# Patient Record
Sex: FEMALE | Race: NATIVE HAWAIIAN OR OTHER PACIFIC ISLANDER
[De-identification: names, ages, dates, MRNs, and addresses within clinical notes are randomized per-mention and may not be internally consistent; named-entity substitution may affect disease eponyms.]

---

## 2017-10-24 ENCOUNTER — HOSPITAL ENCOUNTER (INPATIENT)
Dept: HOSPITAL 31 - C.ER | Age: 77
LOS: 21 days | Discharge: NURSING, REHAB OR LONG-TERM CARE | DRG: 356 | End: 2017-11-14
Attending: FAMILY MEDICINE | Admitting: FAMILY MEDICINE
Payer: MEDICARE

## 2017-10-24 VITALS — BODY MASS INDEX: 25.7 KG/M2

## 2017-10-24 DIAGNOSIS — Z93.1: ICD-10-CM

## 2017-10-24 DIAGNOSIS — N83.9: ICD-10-CM

## 2017-10-24 DIAGNOSIS — K52.9: ICD-10-CM

## 2017-10-24 DIAGNOSIS — N73.6: ICD-10-CM

## 2017-10-24 DIAGNOSIS — R13.10: ICD-10-CM

## 2017-10-24 DIAGNOSIS — I11.0: ICD-10-CM

## 2017-10-24 DIAGNOSIS — K63.89: ICD-10-CM

## 2017-10-24 DIAGNOSIS — I69.391: ICD-10-CM

## 2017-10-24 DIAGNOSIS — K56.41: Primary | ICD-10-CM

## 2017-10-24 DIAGNOSIS — N81.5: ICD-10-CM

## 2017-10-24 DIAGNOSIS — E78.5: ICD-10-CM

## 2017-10-24 DIAGNOSIS — I69.351: ICD-10-CM

## 2017-10-24 DIAGNOSIS — D25.9: ICD-10-CM

## 2017-10-24 DIAGNOSIS — I50.9: ICD-10-CM

## 2017-10-24 DIAGNOSIS — A41.9: ICD-10-CM

## 2017-10-24 DIAGNOSIS — F32.9: ICD-10-CM

## 2017-10-24 DIAGNOSIS — R62.7: ICD-10-CM

## 2017-10-24 DIAGNOSIS — A09: ICD-10-CM
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
## 2017-10-24 DIAGNOSIS — E11.43: ICD-10-CM

## 2017-10-24 DIAGNOSIS — G45.9: ICD-10-CM

## 2017-10-24 DIAGNOSIS — F39: ICD-10-CM

## 2017-10-24 DIAGNOSIS — K59.31: ICD-10-CM

## 2017-10-24 DIAGNOSIS — E11.65: ICD-10-CM

## 2017-10-24 DIAGNOSIS — S36.438A: ICD-10-CM

## 2017-10-24 DIAGNOSIS — K31.84: ICD-10-CM

## 2017-10-24 DIAGNOSIS — F43.9: ICD-10-CM

## 2017-10-24 DIAGNOSIS — E86.0: ICD-10-CM

## 2017-10-24 DIAGNOSIS — E46: ICD-10-CM

## 2017-10-24 DIAGNOSIS — N71.9: ICD-10-CM

## 2017-10-24 DIAGNOSIS — R65.20: ICD-10-CM

## 2017-10-24 DIAGNOSIS — E87.6: ICD-10-CM

## 2017-10-24 DIAGNOSIS — D62: ICD-10-CM

## 2017-10-24 DIAGNOSIS — D50.0: ICD-10-CM

## 2017-10-24 LAB
ALBUMIN/GLOB SERPL: 0.8 {RATIO} (ref 1–2.1)
ALP SERPL-CCNC: 139 U/L (ref 38–126)
ALT SERPL-CCNC: 154 U/L (ref 9–52)
APTT BLD: 28 SECONDS (ref 21–34)
AST SERPL-CCNC: 68 U/L (ref 14–36)
BACTERIA #/AREA URNS HPF: (no result) /[HPF]
BASOPHILS # BLD AUTO: 0 K/UL (ref 0–0.2)
BASOPHILS NFR BLD: 0.1 % (ref 0–2)
BILIRUB SERPL-MCNC: 0.5 MG/DL (ref 0.2–1.3)
BILIRUB UR-MCNC: NEGATIVE MG/DL
BUN SERPL-MCNC: 67 MG/DL (ref 7–17)
CALCIUM SERPL-MCNC: 9.1 MG/DL (ref 8.6–10.4)
CHLORIDE SERPL-SCNC: 100 MMOL/L (ref 98–107)
CO2 SERPL-SCNC: 23 MMOL/L (ref 22–30)
EOSINOPHIL # BLD AUTO: 0 K/UL (ref 0–0.7)
EOSINOPHIL NFR BLD: 0.1 % (ref 0–4)
ERYTHROCYTE [DISTWIDTH] IN BLOOD BY AUTOMATED COUNT: 14.1 % (ref 11.5–14.5)
GLOBULIN SER-MCNC: 4.5 GM/DL (ref 2.2–3.9)
GLUCOSE SERPL-MCNC: 162 MG/DL (ref 65–105)
GLUCOSE UR STRIP-MCNC: NORMAL MG/DL
HCT VFR BLD CALC: 36.5 % (ref 34–47)
INR PPP: 1.4
KETONES UR STRIP-MCNC: NEGATIVE MG/DL
LEUKOCYTE ESTERASE UR-ACNC: (no result) LEU/UL
LYMPHOCYTES # BLD AUTO: 1.1 K/UL (ref 1–4.3)
LYMPHOCYTES NFR BLD AUTO: 3.7 % (ref 20–40)
MCH RBC QN AUTO: 29.4 PG (ref 27–31)
MCHC RBC AUTO-ENTMCNC: 32.8 G/DL (ref 33–37)
MCV RBC AUTO: 89.7 FL (ref 81–99)
MONOCYTES # BLD: 1.9 K/UL (ref 0–0.8)
MONOCYTES NFR BLD: 6.5 % (ref 0–10)
NEUTROPHILS NFR BLD AUTO: 90 % (ref 50–75)
NRBC BLD AUTO-RTO: 0 % (ref 0–2)
PH UR STRIP: 5 [PH] (ref 5–8)
PLATELET # BLD: 313 K/UL (ref 130–400)
PMV BLD AUTO: 8.2 FL (ref 7.2–11.7)
POTASSIUM SERPL-SCNC: 4 MMOL/L (ref 3.6–5.2)
PROT SERPL-MCNC: 7.9 G/DL (ref 6.3–8.3)
PROT UR STRIP-MCNC: (no result) MG/DL
RBC # UR STRIP: NEGATIVE /UL
RBC #/AREA URNS HPF: < 1 /HPF (ref 0–3)
SODIUM SERPL-SCNC: 134 MMOL/L (ref 132–148)
SP GR UR STRIP: 1.02 (ref 1–1.03)
TOTAL CELLS COUNTED BLD: 100
UROBILINOGEN UR-MCNC: NORMAL MG/DL (ref 0.2–1)
WBC # BLD AUTO: 29.8 K/UL (ref 4.8–10.8)
WBC #/AREA URNS HPF: 4 /HPF (ref 0–5)

## 2017-10-24 PROCEDURE — 0WCP3ZZ EXTIRPATION OF MATTER FROM GASTROINTESTINAL TRACT, PERCUTANEOUS APPROACH: ICD-10-PCS | Performed by: SURGERY

## 2017-10-24 RX ADMIN — TAZOBACTAM SODIUM AND PIPERACILLIN SODIUM SCH MLS/HR: 375; 3 INJECTION, SOLUTION INTRAVENOUS at 07:36

## 2017-10-24 RX ADMIN — WATER SCH MLS/HR: 1 INJECTION INTRAMUSCULAR; INTRAVENOUS; SUBCUTANEOUS at 22:23

## 2017-10-24 RX ADMIN — WATER SCH MLS/HR: 1 INJECTION INTRAMUSCULAR; INTRAVENOUS; SUBCUTANEOUS at 07:04

## 2017-10-24 RX ADMIN — TAZOBACTAM SODIUM AND PIPERACILLIN SODIUM SCH MLS/HR: 375; 3 INJECTION, SOLUTION INTRAVENOUS at 17:35

## 2017-10-24 NOTE — CT
PROCEDURE:  CT Abdomen and Pelvis without intravenous contrast



HISTORY:

Abdominal pain 



COMPARISON:

None.



TECHNIQUE:

Multiple contiguous axial images were performed through the abdomen 

and pelvis without the use of intravenous contrast.  Subsequently, 

sagittal and coronal reformatted images were obtained. 



Radiation dose:



Total exam DLP = 325 mGy-cm.



This CT exam was performed using one or more of the following dose 

reduction techniques: Automated exposure control, adjustment of the 

mA and/or kV according to patient size, and/or use of iterative 

reconstruction technique.



FINDINGS:



LOWER THORAX:

Coronary artery calcifications. Prominent atelectasis and 

consolidation seen at the lung bases. Scattered calcifications noted 

within the right lower lobe. 2 millimeter ground-glass nodule in the 

right middle lobe anteriorly. 



LIVER:

Unremarkable. No gross lesion or ductal dilatation.  



GALLBLADDER AND BILE DUCTS:

Unremarkable. 



PANCREAS:

Unremarkable. No gross lesion or ductal dilatation.



SPLEEN:

Unremarkable. 



ADRENALS:

Unremarkable. No mass. 



KIDNEYS AND URETERS:

Unremarkable. No hydronephrosis. No solid mass. 



VASCULATURE:

Unremarkable. No aortic aneurysm. 



BOWEL:

Distal sigmoid colon and rectum are markedly dilated and diffusely 

stool-filled compatible with a large fecal impaction.  Evidence of 

associated stercoral proctocolitis.  Evidence of a prior gastrostomy 

tract extending from the anterior stomach wall to the skin surface. 

Mild thickening of the stomach.



APPENDIX:

Unremarkable. Normal appendix. 



PERITONEUM:

Unremarkable. No free fluid. No free air. 



LYMPH NODES:

Unremarkable. No enlarged lymph nodes. 



BLADDER:

Unremarkable. 



REPRODUCTIVE:

Heterogeneous, lobulated, and well-defined masslike area seen in the 

left hemipelvis which has multiple rounded areas of low density 

within it.  This measures approximately 10 x 7 centimeters and is 

most likely secondary to multiple exophytic left-sided uterine 

fibroids; however, recommend a follow-up pelvic ultrasound to rule 

out a solid left adnexal mass. 



BONES:

Degenerative changes with demineralization. 



Grade 1/2 anterolisthesis of L5 on S1. 



OTHER FINDINGS:

Study limited secondary to patient motion and streak artifact. Streak 

artifact from the patient's arm. 



Atherosclerotic calcification within the aorta. 



IMPRESSION:

Findings suspicious for a large fecal impaction in the distal sigmoid 

colon and rectum causing stercoral proctocolitis. The distal sigmoid 

colon and rectum are markedly dilated.



Evidence of a prior gastrostomy tract extending the anterior stomach 

wall to the skin surface.  Cannot entirely rule out a gastrocutaneous 

fistula.  Clinical correlation. Gastric wall thickening. 



Large masslike area the left pelvis, most likely due to multiple 

exophytic left uterine fibroids. 



Additional findings as above.



These findings were preliminarily reported at 5:52 a.m. on 10/24/2017 

by Dr. Ofelia Hilario virtual radiologic.

## 2017-10-24 NOTE — PCM.SURG1
Surgeon's Initial Post Op Note





- Surgeon's Notes


Surgeon: Dr. Garcia


Assistant: Dr. Sr PGY2


Type of Anesthesia: General LMA


Pre-Operative Diagnosis: fecal impaction


Operative Findings: see dictation


Post-Operative Diagnosis: same


Operation Performed: manual fecal disimpaction


Specimen/Specimens Removed: vaginal culture


Estimated Blood Loss: EBL {In ML}: 0


Drains Used: No Drains


Post-Op Condition: Good


Date of Surgery/Procedure: 10/24/17


Time of Surgery/Procedure: 14:15

## 2017-10-24 NOTE — CP.PCM.CON
History of Present Illness





- History of Present Illness


History of Present Illness: 


GENERAL SURGERY CONSULT NOTE FOR DR. GARNER





75yo French female with PMHx of HTN, DM, CVA presents to the ED from the 

nursing home for leukocytosis and abdominal pain. A French  was 

used but patient was only oriented to person and place and answered many 

questions with "I don't know". Some of the past medical history was obtained 

from EMR. She reports having lower abdominal pain. She has nausea but no 

vomiting. She reports that her last BM was 5 days ago and she last passed 

flatus 5 days ago. She is urinating normally. She is unsure when she last ate. 

She denies SOB, CP, fever, or chills. Labs done in the nursing home showed WBC 

22.





PMHx: HTN, CVA with dysphagia and right hemiparesis, DM, hyperlipidemia, UTI





Surgeries: PEG





Allergies: none





Social history: lives in nursing home





Review of Systems





- Review of Systems


All systems: reviewed and no additional remarkable complaints except (as per HPI

)





Past Patient History





- Infectious Disease


Hx of Infectious Diseases: None





- Past Medical History & Family History


Past Medical History?: Yes





- Past Social History


Smoking Status: Never Smoked





- CARDIAC


Hx Hypercholesterolemia:  (Hyperlipidimia)


Hx Hypertension: Yes





- PULMONARY


Hx Respiratory Disorders: No





- NEUROLOGICAL


Hx Neurological Disorder: Yes


HX Cerebrovascular Accident: Yes





- HEENT


Other/Comment: DYSPHAGIA, PT HAS PEG





- RENAL


Hx Chronic Kidney Disease: No





- ENDOCRINE/METABOLIC


Hx Endocrine Disorders: Yes


Hx Diabetes Mellitus Type 2: Yes





- HEMATOLOGICAL/ONCOLOGICAL


Hx Blood Disorders: No





- INTEGUMENTARY


Hx Dermatological Problems: No





- MUSCULOSKELETAL/RHEUMATOLOGICAL


Hx Musculoskeletal Disorders: No


Hx Falls: No





- GASTROINTESTINAL


Hx Gastrointestinal Disorders: Yes


Other/Comment: PEG IN PLACE,  INCONTINENT OF STOOL





- GENITOURINARY/GYNECOLOGICAL


Hx Genitourinary Disorders: Yes


Other/Comment: INCONTINENT OF URINE,





- PSYCHIATRIC


Hx Psychophysiologic Disorder: No


Hx Substance Use: No





- SURGICAL HISTORY


Hx Surgeries: No





- ANESTHESIA


Hx Anesthesia: Yes





Meds


Allergies/Adverse Reactions: 


 Allergies











Allergy/AdvReac Type Severity Reaction Status Date / Time


 


No Known Allergies Allergy   Verified 10/24/17 06:20














- Medications


Medications: 


 Current Medications





Dextrose/Sodium Chloride (Dextrose 5%/0.45% Ns 1000 Ml)  1,000 mls @ 80 mls/hr 

IV .[id number] [facility]ANDIE


   Last Admin: 10/24/17 06:43 Dose:  80 mls/hr


Piperacillin Sod/Tazobactam Sod (Zosyn 3.375 Gm Iv Premix)  3.375 gm in 50 mls 

@ 200 mls/hr IVPB Q8H [facility]


   Last Admin: 10/24/17 07:36 Dose:  200 mls/hr


Metronidazole (Flagyl)  500 mg in 100 mls @ 100 mls/hr IVPB Q8H [facility]


   Last Admin: 10/24/17 07:04 Dose:  100 mls/hr


Morphine Sulfate (Morphine)  2 mg IVP Q3 [facility]


   Last Admin: 10/24/17 10:39 Dose:  Not Given











Physical Exam





- Constitutional


Appears: Non-toxic, No Acute Distress





- Head Exam


Head Exam: ATRAUMATIC, NORMAL INSPECTION





- Respiratory Exam


Respiratory Exam: NORMAL BREATHING PATTERN.  absent: Respiratory Distress





- Cardiovascular Exam


Cardiovascular Exam: +S1, +S2





- GI/Abdominal Exam


GI & Abdominal Exam: Firm (especially left lower abdomen), Tenderness (mild 

tenderness).  absent: Distended, Guarding, Rebound, Rigid





- Extremities Exam


Extremities exam: Positive for: normal inspection.  Negative for: calf 

tenderness





- Neurological Exam


Neurological exam: Alert, Altered (only oriented to person and place)





- Skin


Skin Exam: Dry, Normal Color, Warm





Results





- Vital Signs


Recent Vital Signs: 


 Last Vital Signs











Temp  98.1 F   10/24/17 09:04


 


Pulse  95 H  10/24/17 09:04


 


Resp  20   10/24/17 09:04


 


BP  111/67   10/24/17 09:04


 


Pulse Ox  96   10/24/17 09:04














- Labs


Result Diagrams: 


 10/24/17 03:58





 10/24/17 03:58


Labs: 


 Laboratory Results - last 24 hr











  10/24/17 10/24/17 10/24/17





  03:58 03:58 03:58


 


WBC  29.8 H D  


 


RBC  4.08  


 


Hgb  12.0  


 


Hct  36.5  


 


MCV  89.7  


 


MCH  29.4  


 


MCHC  32.8 L  


 


RDW  14.1  


 


Plt Count  313  D  


 


MPV  8.2  


 


Neut % (Auto)  89.6 H  


 


Lymph % (Auto)  3.7 L  


 


Mono % (Auto)  6.5  


 


Eos % (Auto)  0.1  


 


Baso % (Auto)  0.1  


 


Neut #  26.7 H  


 


Lymph #  1.1  


 


Mono #  1.9 H  


 


Eos #  0.0  


 


Baso #  0.0  


 


Neutrophils % (Manual)  90 H  


 


Band Neutrophils %  4 H  


 


Lymphocytes % (Manual)  2 L  


 


Monocytes % (Manual)  4  


 


Platelet Estimate  Normal  


 


Poikilocytosis (manual  Slight  


 


Anisocytosis (manual)  Slight  


 


PT    15.5 H


 


INR    1.4


 


APTT    28


 


Sodium   134 


 


Potassium   4.0 


 


Chloride   100 


 


Carbon Dioxide   23 


 


Anion Gap   15 


 


BUN   67 H 


 


Creatinine   1.5 H 


 


Est GFR ( Amer)   41 


 


Est GFR (Non-Af Amer)   34 


 


POC Glucose (mg/dL)   


 


Random Glucose   162 H 


 


Calcium   9.1 


 


Total Bilirubin   0.5 


 


AST   68 H 


 


ALT   154 H D 


 


Alkaline Phosphatase   139 H 


 


Total Protein   7.9 


 


Albumin   3.4 L 


 


Globulin   4.5 H 


 


Albumin/Globulin Ratio   0.8 L 


 


Lipase   109 


 


Urine Color   


 


Urine Clarity   


 


Urine pH   


 


Ur Specific Gravity   


 


Urine Protein   


 


Urine Glucose (UA)   


 


Urine Ketones   


 


Urine Blood   


 


Urine Nitrate   


 


Urine Bilirubin   


 


Urine Urobilinogen   


 


Ur Leukocyte Esterase   


 


Urine WBC (Auto)   


 


Urine RBC (Auto)   


 


Ur Squamous Epith Cells   


 


Amorphous Sediment   


 


Urine Bacteria   














  10/24/17 10/24/17





  03:58 06:49


 


WBC  


 


RBC  


 


Hgb  


 


Hct  


 


MCV  


 


MCH  


 


MCHC  


 


RDW  


 


Plt Count  


 


MPV  


 


Neut % (Auto)  


 


Lymph % (Auto)  


 


Mono % (Auto)  


 


Eos % (Auto)  


 


Baso % (Auto)  


 


Neut #  


 


Lymph #  


 


Mono #  


 


Eos #  


 


Baso #  


 


Neutrophils % (Manual)  


 


Band Neutrophils %  


 


Lymphocytes % (Manual)  


 


Monocytes % (Manual)  


 


Platelet Estimate  


 


Poikilocytosis (manual  


 


Anisocytosis (manual)  


 


PT  


 


INR  


 


APTT  


 


Sodium  


 


Potassium  


 


Chloride  


 


Carbon Dioxide  


 


Anion Gap  


 


BUN  


 


Creatinine  


 


Est GFR ( Amer)  


 


Est GFR (Non-Af Amer)  


 


POC Glucose (mg/dL)   172 H


 


Random Glucose  


 


Calcium  


 


Total Bilirubin  


 


AST  


 


ALT  


 


Alkaline Phosphatase  


 


Total Protein  


 


Albumin  


 


Globulin  


 


Albumin/Globulin Ratio  


 


Lipase  


 


Urine Color  Yellow 


 


Urine Clarity  Hazy 


 


Urine pH  5.0 


 


Ur Specific Gravity  1.019 


 


Urine Protein  1+ H 


 


Urine Glucose (UA)  Normal 


 


Urine Ketones  Negative 


 


Urine Blood  Negative 


 


Urine Nitrate  Negative 


 


Urine Bilirubin  Negative 


 


Urine Urobilinogen  Normal 


 


Ur Leukocyte Esterase  Trace 


 


Urine WBC (Auto)  4 


 


Urine RBC (Auto)  < 1 


 


Ur Squamous Epith Cells  < 1 


 


Amorphous Sediment  Occ H 


 


Urine Bacteria  Occ H 














Assessment & Plan





- Assessment and Plan (Free Text)


Assessment: 


75yo French female with PMHx of HTN, DM, CVA presented from the nursing 

home for leukocytosis and abdominal pain who also was found to have fecal 

impaction





- Afebrile, VSS


- Leukocytosis WBC 29.8


- CT Abd/Pelvis: large fecal impaction in the distal sigmoid colon and rectum 

causing stercoral proctocolitis, distal sigmod colon and rectum are markedly 

dilated, large masslike area in left pelvis, most likely due to multiple 

exophytic left uterine fibroids


- NPO


- Zofran and pain medication PRN


- On IV Abx: Zofran and Flagyl, ID consulted


- OR today for fecal disimpaction and possible ostomy


- Discussed plan with Dr. Radha Eaton PGY-3

## 2017-10-24 NOTE — CP.PCM.HP
History of Present Illness





- History of Present Illness


History of Present Illness: 





Pt is a 75 yo female of [place] extraction, who is a long-term resident of 

[facility] the [facility]. Last Friday, I was called by NOD that pt was 

complaining of pain at the LLQ. There was no blood per stool, no fever, no 

diaphoresis. Pt denied chest pain. Pt had also gone off her food, and ws 

refusing to eat. She would attempt to eat, but would subsequently vomit. Pt's 

VS remained in the normal range, and stat  film of the abdomen ordered 

which showed large amount of retained fecal matter in the lower pelvis. 

Suppositories and enema ordered with various levels of effectiveness. BM noted 

Satruday.


> The following Sunday, I was called by NOD again, this time noting that pt was 

consistently vomiting anything she took in po. Pt still had no blood in stool 

nor fever. Ordered stat CBC and CMP, and orders given for nurse to call once 

results in for bloodwork at any time and if pt spikes a fever, to transfer pt 

immediately to this hospital for further eval. Afte 6 hours or so, BW results 

came in with WBC at 29,000+ and pt did have a temp. AT ED, findings confirmed 

and consult ordered for both ID and Surgery to help with the pt's case. 





Present on Admission





- Present on Admission


Any Indicators Present on Admission: Yes


History of DVT/PE: No


History of Uncontrolled Diabetes: Yes


Urinary Catheter: No


Decubitus Ulcer Present: Yes


Decubitus Ulcer Location: upper buttocks, L, stage II


Decubitus Ulcer Stage: II





- Notes:


Notes:: 





+ dysphagia, s/p PEG





Review of Systems





- Review of Systems


Systems not reviewed;Unavailable: Language Barrier


Review of Systems: 





> abdminal pain, LLQ, with vomiting; no BM


> + hemiparesis, L 





Past Patient History





- Infectious Disease


Hx of Infectious Diseases: None





- Tetanus Immunizations


Tetanus Immunization: Unknown





- Past Medical History & Family History


Past Medical History?: Yes





- Past Social History


Smoking Status: Never Smoked


Alcohol: None


Drugs: Other (pt was undiagnosed diabetic until after admission for the stroke 

and until pt assigned to me at sub acute rehab )


Domestic Violence: Negative





- CARDIAC


Hx Hypercholesterolemia:  (Hyperlipidimia)


Hx Hypertension: Yes





- PULMONARY


Hx Respiratory Disorders: No





- NEUROLOGICAL


Hx Neurological Disorder: Yes


HX Cerebrovascular Accident: Yes





- HEENT


Other/Comment: DYSPHAGIA, PT HAS PEG





- RENAL


Hx Chronic Kidney Disease: No





- ENDOCRINE/METABOLIC


Hx Endocrine Disorders: Yes


Hx Diabetes Mellitus Type 2: Yes





- HEMATOLOGICAL/ONCOLOGICAL


Hx Blood Disorders: No





- INTEGUMENTARY


Hx Dermatological Problems: No





- MUSCULOSKELETAL/RHEUMATOLOGICAL


Hx Musculoskeletal Disorders: No


Hx Falls: No





- GASTROINTESTINAL


Hx Gastrointestinal Disorders: Yes


Other/Comment: PEG IN PLACE,  INCONTINENT OF STOOL





- GENITOURINARY/GYNECOLOGICAL


Hx Genitourinary Disorders: Yes


Other/Comment: INCONTINENT OF URINE,





- PSYCHIATRIC


Hx Psychophysiologic Disorder: No


Hx Substance Use: No





- SURGICAL HISTORY


Hx Surgeries: No





- ANESTHESIA


Hx Anesthesia: Yes





Meds


Allergies/Adverse Reactions: 


 Allergies











Allergy/AdvReac Type Severity Reaction Status Date / Time


 


No Known Allergies Allergy   Verified 10/24/17 06:20














Physical Exam





- Constitutional


Appears: Toxic


Additional comments: 





in acute pain 





- Head Exam


Head Exam: ATRAUMATIC, NORMAL INSPECTION, NORMOCEPHALIC





- Eye Exam


Eye Exam: Normal appearance





- ENT Exam


ENT Exam: Mucous Membranes Dry


Additional comments: 





unable to examine and pt unable to comply 2ndry to pain





- Neck Exam


Neck exam: Positive for: Normal Inspection





- Respiratory Exam


Respiratory Exam: Clear to Auscultation Bilateral, Respiratory Distress (2ndry 

to pain)





- Cardiovascular Exam


Cardiovascular Exam: Tachycardia





- GI/Abdominal Exam


GI & Abdominal Exam: Diminished Bowel Sounds, Distended, Rigid





- Rectal Exam


Rectal Exam: Deferred, Fecal Impaction (per ct abdomen)





- Extremities Exam


Extremities exam: Positive for: normal inspection





- Back Exam


Back exam: NORMAL INSPECTION





- Neurological Exam


Neurological exam: Abnormal Gait, Motor Sensory Deficit (L)





- Psychiatric Exam


Psychiatric exam: Agitated, Anxious





- Skin


Skin Exam: Diaphoretic, Pallor, Warm





Results





- Vital Signs


Recent Vital Signs: 





 Last Vital Signs











Temp  99.3 F   10/24/17 17:30


 


Pulse  95 H  10/24/17 17:30


 


Resp  20   10/24/17 17:30


 


BP  106/66   10/24/17 17:30


 


Pulse Ox  100   10/24/17 17:30














- Labs


Result Diagrams: 


 10/25/17 06:15





 10/25/17 06:15


Labs: 





 Laboratory Results - last 24 hr











  10/24/17 10/24/17 10/24/17





  03:58 03:58 03:58


 


WBC  29.8 H D  


 


RBC  4.08  


 


Hgb  12.0  


 


Hct  36.5  


 


MCV  89.7  


 


MCH  29.4  


 


MCHC  32.8 L  


 


RDW  14.1  


 


Plt Count  313  D  


 


MPV  8.2  


 


Neut % (Auto)  89.6 H  


 


Lymph % (Auto)  3.7 L  


 


Mono % (Auto)  6.5  


 


Eos % (Auto)  0.1  


 


Baso % (Auto)  0.1  


 


Neut #  26.7 H  


 


Lymph #  1.1  


 


Mono #  1.9 H  


 


Eos #  0.0  


 


Baso #  0.0  


 


Neutrophils % (Manual)  90 H  


 


Band Neutrophils %  4 H  


 


Lymphocytes % (Manual)  2 L  


 


Monocytes % (Manual)  4  


 


Platelet Estimate  Normal  


 


Poikilocytosis (manual  Slight  


 


Anisocytosis (manual)  Slight  


 


PT    15.5 H


 


INR    1.4


 


APTT    28


 


Sodium   134 


 


Potassium   4.0 


 


Chloride   100 


 


Carbon Dioxide   23 


 


Anion Gap   15 


 


BUN   67 H 


 


Creatinine   1.5 H 


 


Est GFR ( Amer)   41 


 


Est GFR (Non-Af Amer)   34 


 


POC Glucose (mg/dL)   


 


Random Glucose   162 H 


 


Calcium   9.1 


 


Total Bilirubin   0.5 


 


AST   68 H 


 


ALT   154 H D 


 


Alkaline Phosphatase   139 H 


 


Total Protein   7.9 


 


Albumin   3.4 L 


 


Globulin   4.5 H 


 


Albumin/Globulin Ratio   0.8 L 


 


Lipase   109 


 


Urine Color   


 


Urine Clarity   


 


Urine pH   


 


Ur Specific Gravity   


 


Urine Protein   


 


Urine Glucose (UA)   


 


Urine Ketones   


 


Urine Blood   


 


Urine Nitrate   


 


Urine Bilirubin   


 


Urine Urobilinogen   


 


Ur Leukocyte Esterase   


 


Urine WBC (Auto)   


 


Urine RBC (Auto)   


 


Ur Squamous Epith Cells   


 


Amorphous Sediment   


 


Urine Bacteria   














  10/24/17 10/24/17





  03:58 06:49


 


WBC  


 


RBC  


 


Hgb  


 


Hct  


 


MCV  


 


MCH  


 


MCHC  


 


RDW  


 


Plt Count  


 


MPV  


 


Neut % (Auto)  


 


Lymph % (Auto)  


 


Mono % (Auto)  


 


Eos % (Auto)  


 


Baso % (Auto)  


 


Neut #  


 


Lymph #  


 


Mono #  


 


Eos #  


 


Baso #  


 


Neutrophils % (Manual)  


 


Band Neutrophils %  


 


Lymphocytes % (Manual)  


 


Monocytes % (Manual)  


 


Platelet Estimate  


 


Poikilocytosis (manual  


 


Anisocytosis (manual)  


 


PT  


 


INR  


 


APTT  


 


Sodium  


 


Potassium  


 


Chloride  


 


Carbon Dioxide  


 


Anion Gap  


 


BUN  


 


Creatinine  


 


Est GFR ( Amer)  


 


Est GFR (Non-Af Amer)  


 


POC Glucose (mg/dL)   172 H


 


Random Glucose  


 


Calcium  


 


Total Bilirubin  


 


AST  


 


ALT  


 


Alkaline Phosphatase  


 


Total Protein  


 


Albumin  


 


Globulin  


 


Albumin/Globulin Ratio  


 


Lipase  


 


Urine Color  Yellow 


 


Urine Clarity  Hazy 


 


Urine pH  5.0 


 


Ur Specific Gravity  1.019 


 


Urine Protein  1+ H 


 


Urine Glucose (UA)  Normal 


 


Urine Ketones  Negative 


 


Urine Blood  Negative 


 


Urine Nitrate  Negative 


 


Urine Bilirubin  Negative 


 


Urine Urobilinogen  Normal 


 


Ur Leukocyte Esterase  Trace 


 


Urine WBC (Auto)  4 


 


Urine RBC (Auto)  < 1 


 


Ur Squamous Epith Cells  < 1 


 


Amorphous Sediment  Occ H 


 


Urine Bacteria  Occ H 














Assessment & Plan


(1) Fecal impaction


Assessment and Plan: 


consult Surgery for best course of action and hopefully not an exploratory 

laparotomy at this time. 


Status: Acute   Onset Date: ~10/20/17   





(2) Toxic megacolon


Assessment and Plan: 


will await for function once pt disimpacted


Status: Acute   Onset Date: ~10/20/17   





(3) Colitis


Assessment and Plan: 


possibility of colitis exists and will need appropriate abx regimen. will 

initiate with coverage for anaerobes and gram neg bacteria


Status: Acute   Onset Date: Unknown   





(4) Diabetes mellitus


Assessment and Plan: 


hold meds for now since pt with N&V


Status: Chronic   Priority: Medium   Onset Date: Unknown   





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


Assessment and Plan: 


no new neurologic deficits


Status: Chronic   Priority: Low   Onset Date: Unknown   





Decision To Admit





- Pt Status Changed To:


Hospital Disposition Of: Inpatient





- Admit Certification


Admit to Inpatient:: After my assessment, the patient will require 

hospitalization for at least two midnights.  This is because of the severity of 

symptoms shown, intensity of services needed, and/or the medical risk in this 

patient being treated as an outpatient.





- InPatient:


Physician Admission Certification:: This patient is critically ill and will 

need more than 2 nights of stay to find out the cause of pt's acute abdomen.





- .


Bed Request Type: Regular

## 2017-10-24 NOTE — CP.PCM.CON
History of Present Illness





- History of Present Illness


History of Present Illness: 





75 yo female admitted with abd pain and weakness


referred for ID eval / antibiotic management


Poor historian  





77yo [place] female with PMHx of HTN, DM, CVA presents to the ED from the 

nursing home for leukocytosis and abdominal pain.  She reports having lower 

abdominal pain. She has nausea but no vomiting. She reports that her last BM 

was 5 days ago and she last passed flatus 5 days ago. She is urinating 

normally. She is unsure when she last ate. She denies SOB, CP, fever, or chills.





PMHx: HTN, CVA with dysphagia and right hemiparesis, DM, hyperlipidemia, UTI





Surgeries: PEG





Allergies: none





Social history: lives in nursing home





CT showspossible fecal impaction with distended rectum and sigmoid





PMH- HTN   DM  CVA  right sided weakness , GT in past 


FH  non contributory


ROS  not obtainable


SH  no etoh  no IVDU





Review of Systems





- Review of Systems


All systems: reviewed and no additional remarkable complaints except





- Constitutional


Constitutional: As Per HPI





- EENT


Eyes: absent: As Per HPI, Blind Spots, Blurred Vision, Change in Vision, 

Decreased Night Vision, Diplopia, Discharge, Dry Eye, Exophthalmos, Floaters, 

Irritation, Itchy Eyes, Loss of Peripheral Vision, Pain, Photophobia, Requires 

Corrective Lenses, Sees Flashes, Spots in Vision, Tunnel Vision, Other Visual 

Disturbances, Loss of Vision, Other


Ears: absent: As Per HPI, Decreased Hearing, Ear Discharge, Ear Pain, Tinnitus, 

Abnormal Hearing, Disequilibrium, Dizziness, Other


Nose/Mouth/Throat: absent: As Per HPI, Epistaxis, Nasal Congestion, Nasal 

Discharge, Nasal Obstruction, Nasal Trauma, Nose Pain, Post Nasal Drip, Sinus 

Pain, Sinus Pressure, Bleeding Gums, Change in Voice, Dental Pain, Dry Mouth, 

Dysphagia, Halitosis, Hoarsness, Lip Swelling, Mouth Lesions, Mouth Pain, 

Odynophagia, Sore Throat, Throat Swelling, Tongue Swelling, Facial Pain, Neck 

Pain, Neck Mass, Other





- Breasts


Breasts: absent: As Per HPI, Change in Shape, Mass, Pain, Nipple Discharge, 

Nipple Inversion, Skin Changes, Swelling, Other





- Cardiovascular


Cardiovascular: absent: As Per HPI, Acrocyanosis, Chest Pain, Chest Pain at Rest

, Chest Pain with Activity, Claudication, Diaphoresis, Dyspnea, Dyspnea on 

Exertion, Edema, Irregular Heart Rhythm, Pain Radiating to Arm/Neck/Jaw, Leg 

Edema, Leg Ulcers, Lightheadedness, Orthopnea, Palpitations, Paroxysmal 

Nocturnal Dyspnea, Pedal Edema, Radiating Pain, Rapid Heart Rate, Slow Heart 

Rate, Syncope, Other





- Respiratory


Respiratory: absent: As Per HPI, Cough, Dyspnea, Hemoptysis, Dyspnea on Exertion

, Wheezing, Snoring, Stridor, Pain on Inspiration, Chest Congestion, Excessive 

Mucous Production, Change in Mucous Color, Pain with Coughing, Other





- Gastrointestinal


Gastrointestinal: As Per HPI





- Genitourinary


Genitourinary: absent: As Per HPI, Change in Urinary Stream, Difficulty 

Urinating, Dysuria, Flank Pain, Hematuria, Pyuria, Nocturia, Urinary 

Incontinence, Urinary Frequency, Urinary Hesitance, Urinary Urgency, Voiding 

Freq/Small Amts, Freq UTI, Hx Renal/Bladder Calculi, Hx /Renal Surgery, 

Bladder Distension, Other





- Reproductive: Female


Reproductive:Female: absent: As Per HPI, Amenorrhea, Amenorrhea/Birth Control, 

Currently Menstual, Cycle <21 Days, Cycle >35 Days, Cycle Variable, Menses 1-7 

Days, Menses >/= 8 Days, Menses Variable, Cycle > 4 Weeks Between, No Menses 

for 6 Months, Heavy Menses, Light Menses, Normal Menses, Spotting Between Cycles

, S/P Hysterectomy, Menopausal, Post Menopausal, Premenarche, Abnormal Vaginal 

Bleeding, Dysmenorrhea, Dyspareunia, Genital Lesions, Genital Pruritis, Pelvic 

Pain, Prolapse Symptoms, Sexual Dysfunction, Vaginal Discharge, Vaginal Dryness

, Vaginal Odor, Vaginal Pruritis, Other





- Menstruation


Menstruation: absent: As Per HPI, Amenorrhea, Amenorrhea/Birth Control, 

Currently Menstual, Cycle <21 Days, Cycle >35 Days, Cycle Variable, Menses 1-7 

Days, Menses >/= 8 Days, Menses Variable, Cycle > 4 Weeks Between, No Menses 

for 6 Months, Heavy Menses, Light Menses, Normal Menses, Spotting Between Cycles

, S/P Hysterectomy, Menopausal, Post Menopausal, Premenarche, Abnormal Vaginal 

Bleeding, Dysmenorrhea, Other





- Musculoskeletal


Musculoskeletal: As Per HPI





- Integumentary


Integumentary: As Per HPI





- Neurological


Neurological: As Per HPI





- Psychiatric


Psychiatric: absent: As Per HPI, Abnormal Sleep Pattern, Anhedonia, Anxiety, 

Auditory Hallucinations, Behavioral Changes, Change in Appetite, Change in 

Libido, Confusion, Depression, Difficulty Concentrating, Hallucinations, 

Homicidal Ideation, Hopelessness, Irritability, Memory Loss, Mood Swings, Panic 

Attacks, Paranoia, Suicidal Ideation, Visual Hallucinations, Tactile 

Hallucinations, Other





- Endocrine


Endocrine: absent: As Per HPI, Change in Body Appearance, Change in Libido, 

Cold Intolorance, Deepening of Voice, Excessive Sweating, Fatigue, Flushing, 

Heat Intolorance, Increase in Ring/Shoe/Hat Size, Palpitations, Polydipsia, 

Polyphagia, Polyuria, Other





- Hematologic/Lymphatic


Hematologic: absent: As Per HPI, Easy Bleeding, Easy Bruising, Lymphadenopathy, 

Other





Past Patient History





- Infectious Disease


Hx of Infectious Diseases: None





- Past Medical History & Family History


Past Medical History?: Yes





- Past Social History


Smoking Status: Never Smoked





- CARDIAC


Hx Hypercholesterolemia:  (Hyperlipidimia)


Hx Hypertension: Yes





- PULMONARY


Hx Respiratory Disorders: No





- NEUROLOGICAL


Hx Neurological Disorder: Yes


HX Cerebrovascular Accident: Yes





- HEENT


Other/Comment: DYSPHAGIA, PT HAS PEG





- RENAL


Hx Chronic Kidney Disease: No





- ENDOCRINE/METABOLIC


Hx Endocrine Disorders: Yes


Hx Diabetes Mellitus Type 2: Yes





- HEMATOLOGICAL/ONCOLOGICAL


Hx Blood Disorders: No





- INTEGUMENTARY


Hx Dermatological Problems: No





- MUSCULOSKELETAL/RHEUMATOLOGICAL


Hx Musculoskeletal Disorders: No


Hx Falls: No





- GASTROINTESTINAL


Hx Gastrointestinal Disorders: Yes


Other/Comment: PEG IN PLACE,  INCONTINENT OF STOOL





- GENITOURINARY/GYNECOLOGICAL


Hx Genitourinary Disorders: Yes


Other/Comment: INCONTINENT OF URINE,





- PSYCHIATRIC


Hx Psychophysiologic Disorder: No


Hx Substance Use: No





- SURGICAL HISTORY


Hx Surgeries: No





- ANESTHESIA


Hx Anesthesia: Yes





Meds


Allergies/Adverse Reactions: 


 Allergies











Allergy/AdvReac Type Severity Reaction Status Date / Time


 


No Known Allergies Allergy   Verified 10/24/17 06:20














- Medications


Medications: 


 Current Medications





Dextrose/Sodium Chloride (Dextrose 5%/0.45% Ns 1000 Ml)  1,000 mls @ 80 mls/hr 

IV [id number] [facility]


   Last Admin: 10/24/17 06:43 Dose:  80 mls/hr


Piperacillin Sod/Tazobactam Sod (Zosyn 3.375 Gm Iv Premix)  3.375 gm in 50 mls 

@ 200 mls/hr IVPB Q8H [facility]


   Last Admin: 10/24/17 07:36 Dose:  200 mls/hr


Metronidazole (Flagyl)  500 mg in 100 mls @ 100 mls/hr IVPB Q8H [facility]


   Last Admin: 10/24/17 07:04 Dose:  100 mls/hr


Morphine Sulfate (Morphine)  2 mg IVP Q3 [facility]


   Last Admin: 10/24/17 10:39 Dose:  Not Given











Physical Exam





- Constitutional


Appears: Non-toxic, Cachectic, Chronically Ill





- Head Exam


Head Exam: NORMOCEPHALIC





- Eye Exam


Eye Exam: PERRL.  absent: Scleral icterus





- ENT Exam


ENT Exam: Mucous Membranes Dry, Normal External Ear Exam





- Neck Exam


Neck exam: Negative for: Lymphadenopathy





- Respiratory Exam


Respiratory Exam: Decreased Breath Sounds, Rhonchi





- Cardiovascular Exam


Cardiovascular Exam: REGULAR RHYTHM, +S1, +S2





- GI/Abdominal Exam


GI & Abdominal Exam: Diminished Bowel Sounds, Distended, Firm, Guarding, 

Tenderness.  absent: Rebound, Rigid





- Rectal Exam


Rectal Exam: Deferred





- Extremities Exam


Extremities exam: Positive for: pedal pulses present.  Negative for: calf 

tenderness, pedal edema, tenderness





- Back Exam


Back exam: absent: CVA tenderness (L), CVA tenderness (R)





- Neurological Exam


Neurological exam: Alert, CN II-XII Intact, Motor Sensory Deficit





- Psychiatric Exam


Psychiatric exam: Depressed





- Skin


Skin Exam: Dry, Intact





Results





- Vital Signs


Recent Vital Signs: 


 Last Vital Signs











Temp  98.1 F   10/24/17 09:04


 


Pulse  95 H  10/24/17 09:04


 


Resp  20   10/24/17 09:04


 


BP  111/67   10/24/17 09:04


 


Pulse Ox  96   10/24/17 09:04














- Labs


Result Diagrams: 


 10/24/17 03:58





 10/24/17 03:58


Labs: 


 Laboratory Results - last 24 hr











  10/24/17 10/24/17 10/24/17





  03:58 03:58 03:58


 


WBC  29.8 H D  


 


RBC  4.08  


 


Hgb  12.0  


 


Hct  36.5  


 


MCV  89.7  


 


MCH  29.4  


 


MCHC  32.8 L  


 


RDW  14.1  


 


Plt Count  313  D  


 


MPV  8.2  


 


Neut % (Auto)  89.6 H  


 


Lymph % (Auto)  3.7 L  


 


Mono % (Auto)  6.5  


 


Eos % (Auto)  0.1  


 


Baso % (Auto)  0.1  


 


Neut #  26.7 H  


 


Lymph #  1.1  


 


Mono #  1.9 H  


 


Eos #  0.0  


 


Baso #  0.0  


 


Neutrophils % (Manual)  90 H  


 


Band Neutrophils %  4 H  


 


Lymphocytes % (Manual)  2 L  


 


Monocytes % (Manual)  4  


 


Platelet Estimate  Normal  


 


Poikilocytosis (manual  Slight  


 


Anisocytosis (manual)  Slight  


 


PT    15.5 H


 


INR    1.4


 


APTT    28


 


Sodium   134 


 


Potassium   4.0 


 


Chloride   100 


 


Carbon Dioxide   23 


 


Anion Gap   15 


 


BUN   67 H 


 


Creatinine   1.5 H 


 


Est GFR ( Amer)   41 


 


Est GFR (Non-Af Amer)   34 


 


POC Glucose (mg/dL)   


 


Random Glucose   162 H 


 


Calcium   9.1 


 


Total Bilirubin   0.5 


 


AST   68 H 


 


ALT   154 H D 


 


Alkaline Phosphatase   139 H 


 


Total Protein   7.9 


 


Albumin   3.4 L 


 


Globulin   4.5 H 


 


Albumin/Globulin Ratio   0.8 L 


 


Lipase   109 


 


Urine Color   


 


Urine Clarity   


 


Urine pH   


 


Ur Specific Gravity   


 


Urine Protein   


 


Urine Glucose (UA)   


 


Urine Ketones   


 


Urine Blood   


 


Urine Nitrate   


 


Urine Bilirubin   


 


Urine Urobilinogen   


 


Ur Leukocyte Esterase   


 


Urine WBC (Auto)   


 


Urine RBC (Auto)   


 


Ur Squamous Epith Cells   


 


Amorphous Sediment   


 


Urine Bacteria   














  10/24/17 10/24/17





  03:58 06:49


 


WBC  


 


RBC  


 


Hgb  


 


Hct  


 


MCV  


 


MCH  


 


MCHC  


 


RDW  


 


Plt Count  


 


MPV  


 


Neut % (Auto)  


 


Lymph % (Auto)  


 


Mono % (Auto)  


 


Eos % (Auto)  


 


Baso % (Auto)  


 


Neut #  


 


Lymph #  


 


Mono #  


 


Eos #  


 


Baso #  


 


Neutrophils % (Manual)  


 


Band Neutrophils %  


 


Lymphocytes % (Manual)  


 


Monocytes % (Manual)  


 


Platelet Estimate  


 


Poikilocytosis (manual  


 


Anisocytosis (manual)  


 


PT  


 


INR  


 


APTT  


 


Sodium  


 


Potassium  


 


Chloride  


 


Carbon Dioxide  


 


Anion Gap  


 


BUN  


 


Creatinine  


 


Est GFR ( Amer)  


 


Est GFR (Non-Af Amer)  


 


POC Glucose (mg/dL)   172 H


 


Random Glucose  


 


Calcium  


 


Total Bilirubin  


 


AST  


 


ALT  


 


Alkaline Phosphatase  


 


Total Protein  


 


Albumin  


 


Globulin  


 


Albumin/Globulin Ratio  


 


Lipase  


 


Urine Color  Yellow 


 


Urine Clarity  Hazy 


 


Urine pH  5.0 


 


Ur Specific Gravity  1.019 


 


Urine Protein  1+ H 


 


Urine Glucose (UA)  Normal 


 


Urine Ketones  Negative 


 


Urine Blood  Negative 


 


Urine Nitrate  Negative 


 


Urine Bilirubin  Negative 


 


Urine Urobilinogen  Normal 


 


Ur Leukocyte Esterase  Trace 


 


Urine WBC (Auto)  4 


 


Urine RBC (Auto)  < 1 


 


Ur Squamous Epith Cells  < 1 


 


Amorphous Sediment  Occ H 


 


Urine Bacteria  Occ H 














Assessment & Plan





- Assessment and Plan (Free Text)


Assessment: 





abd pain with leukocytosis- possible colitis


r/o obstruction secondary to fecal impaction


r/o sepsis


dehydration


old cva with right sided weakness 


Plan: 





cont iv antibiotics


surgical eval dr Garcia

## 2017-10-24 NOTE — RAD
PROCEDURE:  CHEST RADIOGRAPH, 1 VIEW Technique: Single view portable 

semi erect @ 04:55 



HISTORY:

ABDOMEN PAIN



COMPARISON:

07/21/2016



FINDINGS:



LUNGS:

Clear.



PLEURA:

No pneumothorax or pleural fluid seen.



CARDIOVASCULAR:

 No radiographic findings to suggest acute or significant 

cardiovascular disease.



OSSEOUS STRUCTURES:

No significant abnormalities.



VISUALIZED UPPER ABDOMEN:

Normal.



OTHER FINDINGS:

None. 



IMPRESSION:

No active disease. No acute/significant interval changes. 



_____________________________________________



Please note: No preliminary report/ innterpretation of this 

examination provided by emergency department personnel.

## 2017-10-25 LAB
BUN SERPL-MCNC: 32 MG/DL (ref 7–17)
CALCIUM SERPL-MCNC: 8 MG/DL (ref 8.6–10.4)
CHLORIDE SERPL-SCNC: 101 MMOL/L (ref 98–107)
CO2 SERPL-SCNC: 22 MMOL/L (ref 22–30)
ERYTHROCYTE [DISTWIDTH] IN BLOOD BY AUTOMATED COUNT: 13.8 % (ref 11.5–14.5)
GLUCOSE SERPL-MCNC: 170 MG/DL (ref 65–105)
HCT VFR BLD CALC: 30.8 % (ref 34–47)
MCH RBC QN AUTO: 30.1 PG (ref 27–31)
MCHC RBC AUTO-ENTMCNC: 33.6 G/DL (ref 33–37)
MCV RBC AUTO: 89.5 FL (ref 81–99)
PLATELET # BLD: 260 K/UL (ref 130–400)
PMV BLD AUTO: 7.9 FL (ref 7.2–11.7)
POTASSIUM SERPL-SCNC: 3.9 MMOL/L (ref 3.6–5.2)
SODIUM SERPL-SCNC: 133 MMOL/L (ref 132–148)
WBC # BLD AUTO: 34.7 K/UL (ref 4.8–10.8)

## 2017-10-25 RX ADMIN — WATER SCH MLS/HR: 1 INJECTION INTRAMUSCULAR; INTRAVENOUS; SUBCUTANEOUS at 22:58

## 2017-10-25 RX ADMIN — ALBUTEROL SULFATE SCH MG: 2.5 SOLUTION RESPIRATORY (INHALATION) at 00:54

## 2017-10-25 RX ADMIN — ALBUTEROL SULFATE SCH MG: 2.5 SOLUTION RESPIRATORY (INHALATION) at 07:23

## 2017-10-25 RX ADMIN — TAZOBACTAM SODIUM AND PIPERACILLIN SODIUM SCH MLS/HR: 375; 3 INJECTION, SOLUTION INTRAVENOUS at 17:43

## 2017-10-25 RX ADMIN — ALBUTEROL SULFATE SCH: 2.5 SOLUTION RESPIRATORY (INHALATION) at 04:11

## 2017-10-25 RX ADMIN — ALBUTEROL SULFATE SCH MG: 2.5 SOLUTION RESPIRATORY (INHALATION) at 15:29

## 2017-10-25 RX ADMIN — ALBUTEROL SULFATE SCH MG: 2.5 SOLUTION RESPIRATORY (INHALATION) at 20:28

## 2017-10-25 RX ADMIN — TAZOBACTAM SODIUM AND PIPERACILLIN SODIUM SCH MLS/HR: 375; 3 INJECTION, SOLUTION INTRAVENOUS at 08:57

## 2017-10-25 RX ADMIN — ALBUTEROL SULFATE SCH MG: 2.5 SOLUTION RESPIRATORY (INHALATION) at 11:08

## 2017-10-25 RX ADMIN — TAZOBACTAM SODIUM AND PIPERACILLIN SODIUM SCH MLS/HR: 375; 3 INJECTION, SOLUTION INTRAVENOUS at 00:11

## 2017-10-25 RX ADMIN — WATER SCH MLS/HR: 1 INJECTION INTRAMUSCULAR; INTRAVENOUS; SUBCUTANEOUS at 15:15

## 2017-10-25 RX ADMIN — WATER SCH MLS/HR: 1 INJECTION INTRAMUSCULAR; INTRAVENOUS; SUBCUTANEOUS at 06:15

## 2017-10-25 NOTE — CP.PCM.PN
Subjective





- Date & Time of Evaluation


Date of Evaluation: 10/25/17


Time of Evaluation: 09:00





- Subjective


Subjective: 





patient remains awake and alert


wbc noted > 30 K


had disimpaction but remains rtender over LLQ


repeat CT abd pending


IV antibiotics in progress 





Objective





- Vital Signs/Intake and Output


Vital Signs (last 24 hours): 


 











Temp Pulse Resp BP Pulse Ox


 


 99.4 F   101 H  20   112/71   99 


 


 10/25/17 15:00  10/25/17 15:00  10/25/17 15:00  10/25/17 15:00  10/25/17 15:00








Intake and Output: 


 











 10/25/17 10/25/17





 06:59 18:59


 


Intake Total 320 2165


 


Balance 320 2165














- Medications


Medications: 


 Current Medications





Acetaminophen (Tylenol 325mg Tab)  325 mg PO Q4 PRN


   PRN Reason: Fever >100.4 F


Albuterol Sulfate (Albuterol 0.083% Inhal Sol (2.5 Mg/3 Ml) Ud)  2.5 mg IH RQ4 

[facility]


   Last Admin: 10/25/17 15:29 Dose:  2.5 mg


Piperacillin Sod/Tazobactam Sod (Zosyn 3.375 Gm Iv Premix)  3.375 gm in 50 mls 

@ 200 mls/hr IVPB Q8H [facility]


   Last Admin: 10/25/17 17:43 Dose:  200 mls/hr


Metronidazole (Flagyl)  500 mg in 100 mls @ 100 mls/hr IVPB Q8H [facility]


   Last Admin: 10/25/17 15:15 Dose:  100 mls/hr


Sodium Chloride (Sodium Chloride 0.9%)  1,000 mls @ 106 mls/hr IV .Q9H27M [facility]


   Last Admin: 10/25/17 15:16 Dose:  106 mls/hr


Lactulose (Enulose)  20 gm PO BID [facility]


   Last Admin: 10/25/17 09:15 Dose:  Not Given


Morphine Sulfate (Morphine)  1 mg IVP Q4 PRN


   PRN Reason: pain


   Last Admin: 10/25/17 11:19 Dose:  1 mg


Ondansetron HCl (Zofran Inj)  4 mg IVP Q4 PRN


   PRN Reason: Nausea/Vomiting











- Labs


Labs: 


 





 10/25/17 06:15 





 10/25/17 06:15 





 











PT  15.5 SECONDS (9.7-12.2)  H  10/24/17  03:58    


 


INR  1.4   10/24/17  03:58    


 


APTT  28 SECONDS (21-34)   10/24/17  03:58    














- Constitutional


Appears: Cachectic, Chronically Ill





- Head Exam


Head Exam: NORMOCEPHALIC





- Eye Exam


Eye Exam: PERRL





- ENT Exam


ENT Exam: Mucous Membranes Dry





- Neck Exam


Neck Exam: absent: Lymphadenopathy





- Respiratory Exam


Respiratory Exam: Decreased Breath Sounds





- Cardiovascular Exam


Cardiovascular Exam: REGULAR RHYTHM





- GI/Abdominal Exam


GI & Abdominal Exam: Distended, Soft





- Rectal Exam


Rectal Exam: Deferred





-  Exam


 Exam: NORMAL INSPECTION





- Extremities Exam


Extremities Exam: absent: Pedal Edema





- Back Exam


Back Exam: absent: CVA tenderness (L), CVA tenderness (R)





- Neurological Exam


Neuro motor strength exam: Left Upper Extremity: 4, Right Upper Extremity: 0, 

Left Lower Extremity: 4, Right Lower Extremity: 0





- Psychiatric Exam


Psychiatric exam: Depressed





- Skin


Skin Exam: Dry





Assessment and Plan


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


Status: Acute   





(2) Diabetes mellitus


Status: Chronic   





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


Status: Chronic   





- Assessment and Plan (Free Text)


Assessment: 





patient remains awake and alert


wbc noted > 30 K


had disimpaction but remains rtender over LLQ


repeat CT abd pending


IV antibiotics in progress

## 2017-10-25 NOTE — CP.PCM.PN
Subjective





- Date & Time of Evaluation


Date of Evaluation: 10/25/17


Time of Evaluation: 13:16





- Subjective


Subjective: 





difficult to evaluate patient 





 xrays do not demonstrate perforation but wbc over 30K 


no spontaneous BM





fu ct pending 





 if negative continue to monitor if shows perforation would need colectomy





Objective





- Vital Signs/Intake and Output


Vital Signs (last 24 hours): 


 











Temp Pulse Resp BP Pulse Ox


 


 98.4 F   102 H  20   107/66   100 


 


 10/25/17 07:38  10/25/17 07:38  10/25/17 07:38  10/25/17 07:38  10/25/17 07:38








Intake and Output: 


 











 10/25/17 10/25/17





 06:59 18:59


 


Intake Total 320 670


 


Balance 320 670














- Medications


Medications: 


 Current Medications





Acetaminophen (Tylenol 325mg Tab)  325 mg PO Q4 PRN


   PRN Reason: Fever >100.4 F


Albuterol Sulfate (Albuterol 0.083% Inhal Sol (2.5 Mg/3 Ml) Ud)  2.5 mg IH RQ4 

[facility]


   Last Admin: 10/25/17 11:08 Dose:  2.5 mg


Piperacillin Sod/Tazobactam Sod (Zosyn 3.375 Gm Iv Premix)  3.375 gm in 50 mls 

@ 200 mls/hr IVPB Q8H [facility]


   Last Admin: 10/25/17 08:57 Dose:  200 mls/hr


Metronidazole (Flagyl)  500 mg in 100 mls @ 100 mls/hr IVPB Q8H [facility]


   Last Admin: 10/25/17 06:15 Dose:  100 mls/hr


Sodium Chloride (Sodium Chloride 0.9%)  1,000 mls @ 1,000 mls/hr IV .Q1H [facility]


   Stop: 10/25/17 13:29


Sodium Chloride (Sodium Chloride 0.9%)  1,000 mls @ 106 mls/hr IV .Q9H27M [facility]


Lactulose (Enulose)  20 gm PO BID [facility]


   Last Admin: 10/25/17 09:15 Dose:  Not Given


Morphine Sulfate (Morphine)  1 mg IVP Q4 PRN


   PRN Reason: pain


   Last Admin: 10/25/17 11:19 Dose:  1 mg


Ondansetron HCl (Zofran Inj)  4 mg IVP Q4 PRN


   PRN Reason: Nausea/Vomiting











- Labs


Labs: 


 





 10/25/17 06:15 





 10/25/17 06:15 





 











PT  15.5 SECONDS (9.7-12.2)  H  10/24/17  03:58    


 


INR  1.4   10/24/17  03:58    


 


APTT  28 SECONDS (21-34)   10/24/17  03:58

## 2017-10-25 NOTE — CT
PROCEDURE:  CT Abdomen and Pelvis with contrast



HISTORY:

worsening abd pain, s/p fecal disimpaction yest



COMPARISON:

Abdomen pelvis CT examination without contrast 05/24/2017.



TECHNIQUE:

Contrast dose: None



Radiation dose:



Total exam DLP = 731.09 mGy-cm.



This CT exam was performed using one or more of the following dose 

reduction techniques: Automated exposure control, adjustment of the 

mA and/or kV according to patient size, and/or use of iterative 

reconstruction technique.



FINDINGS:



LOWER THORAX:

Bilateral basilar linear atelectasis identified as well as 

cardiomegaly. No definite pleural or pericardial effusion a small 

hiatal hernia is identified. 



LIVER:

Mild diffuse fatty infiltration liver is appreciate without discrete 

mass evident. 



GALLBLADDER AND BILE DUCTS:

Gallbladder is distended but otherwise unremarkable. 



PANCREAS:

Unremarkable. No gross lesion or ductal dilatation.



SPLEEN:

Unremarkable. 



ADRENALS:

Unremarkable. No mass. 



KIDNEYS AND URETERS:

Unremarkable. No hydronephrosis. No solid mass. 



VASCULATURE:

Unremarkable. No aortic aneurysm. 



BOWEL:

Interval decompression of the rectum is appreciate status post recent 

manual disimpaction in the operating room with residual retained 

fecal material distending the rectum to 6.4 cm compared to 9.4 cm 

10/24/2017. No definite bowel obstruction is appreciated this time 

her mural thickening is seen at the distal rectosigmoid and clinical 

correlation is advised for potential developing colitis. Stomach is 

collapsed and is poorly evaluated as a result with small-bowel 

appearing unremarkable. 



APPENDIX:

Normal appendix. 



PERITONEUM:

Unremarkable. No free fluid. No free air. 



LYMPH NODES:

Unremarkable. No enlarged lymph nodes. 



BLADDER:

Unremarkable. 



REPRODUCTIVE:

There is a complex solid and cystic mass identified at the left 

parasagittal mid to inferior pelvis measuring 11.4 x 8.2 x 7.2 cm 

(superoinferior by transverse by anteroposterior dimensions). Trace 

gas seen within 1 of the fluid collections within this structure and 

may represent a uterine lesion however left adnexal lesion is not 

completely excluded.  Consider follow-up MRI without contrast for 

greater characterization. Ultrasonography may be helpful only by 

transabdominal technique as transvaginal technique may not have 

enough reach capture this lesion in its entirety. 



BONES:

No acute fracture. 



OTHER FINDINGS:

None.



IMPRESSION:

1. Diminished retained fecal material at the rectosigmoid status post 

recent manual disimpaction there is significant residual remains at 

the distal rectosigmoid with mild mural thickening of the sigmoid 

colon question. No definite bowel obstruction ascites or free 

intrarenal gas.  Further clinical correlation and imaging follow-up 

are advised. 



2. 11.4 cm complex solid/cystic lesion possibly related to the uterus 

although left ovary is not excluded. Consider follow-up MRI for 

additional characterization. Follow-up MRI with without contrast 

advised for additional characterization. 



Findings discussed with Dr. Garcia 10/25/2017 2:45 p.m..

## 2017-10-25 NOTE — CARD
--------------- APPROVED REPORT --------------





EKG Measurement

Heart [id number]

IL 118P2

[id number]

[id number]

[id number]



<Conclusion>

Sinus tachycardia

Otherwise normal ECG

## 2017-10-25 NOTE — OP
PROCEDURE DATE:  10/24/2014



PREOPERATIVE DIAGNOSIS:  Fecal impaction.



POSTOPERATIVE DIAGNOSIS:  Fecal impaction.



PROCEDURE CARRIED OUT:  Manual disimpaction in the operating room with

general anesthesia.



SURGEON:  Peng Garcia Jr., MD.



ASSISTANT:  Dr. Carr, resident.



ANESTHESIOLOGIST:  Mr. Vaughn.



INDICATIONS:  The patient is an elderly woman.  Consent was obtained from

the family.  She presented with a fecal impaction, quite large, almost the

size of a pregnancy.



DESCRIPTION OF PROCEDURE:  She was put to sleep.  This area was evacuated,

we evacuated all the stool present in the rectum.  There was also purulent

material draining from the vagina.  This was cultured.  The patient

tolerated the procedure uneventfully.  There was no sign of any perforation

or increased abdominal tenderness at present.  Approximately 5 pounds of

stool was removed from the rectal area.  Procedure was terminated and the

patient was returned to room with orders for laxatives, etc.





__________________________________________

Peng Garcia Jr., MD



DD:  10/24/2017 14:48:57

DT:  10/24/2017 15:26:36

Job # [id number]

## 2017-10-25 NOTE — RAD
HISTORY:

s/p disimpaction  



COMPARISON:

Abdomen pelvis CT examination 10/24/2017.



FINDINGS:



BOWEL:

Diminishing retained fecal material seen in the rectal vault as 

compared to prior and pelvis CT noted above. No definite bowel 

obstruction. Gas seen distending various large-bowel loops 

moderately.  Occasions small-bowel loops are shown with gas as well 

though not grossly distended. 



BONES:

Multilevel degenerative spondylosis is identified.



OTHER FINDINGS:

Vascular calcifications seen in the region of the abdominal aorta and 

iliofemoral arterial system bilaterally.



IMPRESSION:

Significant residual stool is seen in the rectum and distal sigmoid 

colon but less than vascular prior and pelvis CT 10/24/2017 

compatible the patient's history of disimpaction. No prominent free 

intrarenal gas.  A nonobstructive bowel gas pattern is appreciated.

## 2017-10-26 LAB
ALBUMIN/GLOB SERPL: 0.6 {RATIO} (ref 1–2.1)
ALP SERPL-CCNC: 82 U/L (ref 38–126)
ALT SERPL-CCNC: 70 U/L (ref 9–52)
AST SERPL-CCNC: 21 U/L (ref 14–36)
BASOPHILS # BLD AUTO: 0 K/UL (ref 0–0.2)
BASOPHILS NFR BLD: 0.1 % (ref 0–2)
BASOPHILS NFR BLD: 1 % (ref 0–2)
BILIRUB SERPL-MCNC: 0.4 MG/DL (ref 0.2–1.3)
BUN SERPL-MCNC: 14 MG/DL (ref 7–17)
BUN SERPL-MCNC: 16 MG/DL (ref 7–17)
BUN SERPL-MCNC: 9 MG/DL (ref 7–17)
CALCIUM SERPL-MCNC: 6.8 MG/DL (ref 8.6–10.4)
CALCIUM SERPL-MCNC: 7.9 MG/DL (ref 8.6–10.4)
CALCIUM SERPL-MCNC: 8.3 MG/DL (ref 8.6–10.4)
CHLORIDE SERPL-SCNC: 106 MMOL/L (ref 98–107)
CHLORIDE SERPL-SCNC: 106 MMOL/L (ref 98–107)
CHLORIDE SERPL-SCNC: 110 MMOL/L (ref 98–107)
CO2 SERPL-SCNC: 11 MMOL/L (ref 22–30)
CO2 SERPL-SCNC: 17 MMOL/L (ref 22–30)
CO2 SERPL-SCNC: 22 MMOL/L (ref 22–30)
EOSINOPHIL # BLD AUTO: 0 K/UL (ref 0–0.7)
EOSINOPHIL NFR BLD: 0 % (ref 0–4)
ERYTHROCYTE [DISTWIDTH] IN BLOOD BY AUTOMATED COUNT: 13.9 % (ref 11.5–14.5)
ERYTHROCYTE [DISTWIDTH] IN BLOOD BY AUTOMATED COUNT: 14.1 % (ref 11.5–14.5)
ERYTHROCYTE [DISTWIDTH] IN BLOOD BY AUTOMATED COUNT: 14.2 % (ref 11.5–14.5)
GLOBULIN SER-MCNC: 3.7 GM/DL (ref 2.2–3.9)
GLUCOSE SERPL-MCNC: 108 MG/DL (ref 65–105)
GLUCOSE SERPL-MCNC: 138 MG/DL (ref 65–105)
GLUCOSE SERPL-MCNC: 193 MG/DL (ref 65–105)
HCT VFR BLD CALC: 22.7 % (ref 34–47)
HCT VFR BLD CALC: 30.2 % (ref 34–47)
HCT VFR BLD CALC: 36.5 % (ref 34–47)
LYMPHOCYTES # BLD AUTO: 0.7 K/UL (ref 1–4.3)
LYMPHOCYTES NFR BLD AUTO: 2.5 % (ref 20–40)
MCH RBC QN AUTO: 29.3 PG (ref 27–31)
MCH RBC QN AUTO: 29.6 PG (ref 27–31)
MCH RBC QN AUTO: 29.7 PG (ref 27–31)
MCHC RBC AUTO-ENTMCNC: 32.1 G/DL (ref 33–37)
MCHC RBC AUTO-ENTMCNC: 32.4 G/DL (ref 33–37)
MCHC RBC AUTO-ENTMCNC: 33.4 G/DL (ref 33–37)
MCV RBC AUTO: 88.9 FL (ref 81–99)
MCV RBC AUTO: 90.6 FL (ref 81–99)
MCV RBC AUTO: 92 FL (ref 81–99)
MONOCYTES # BLD: 1.3 K/UL (ref 0–0.8)
MONOCYTES NFR BLD: 4.6 % (ref 0–10)
NEUTROPHILS NFR BLD AUTO: 88 % (ref 50–75)
NRBC BLD AUTO-RTO: 0 % (ref 0–2)
PLATELET # BLD: 221 K/UL (ref 130–400)
PLATELET # BLD: 249 K/UL (ref 130–400)
PLATELET # BLD: 250 K/UL (ref 130–400)
PMV BLD AUTO: 7.7 FL (ref 7.2–11.7)
PMV BLD AUTO: 8 FL (ref 7.2–11.7)
PMV BLD AUTO: 8 FL (ref 7.2–11.7)
POTASSIUM SERPL-SCNC: 3.1 MMOL/L (ref 3.6–5.2)
POTASSIUM SERPL-SCNC: 3.8 MMOL/L (ref 3.6–5.2)
POTASSIUM SERPL-SCNC: 3.8 MMOL/L (ref 3.6–5.2)
PROT SERPL-MCNC: 6.1 G/DL (ref 6.3–8.3)
SODIUM SERPL-SCNC: 130 MMOL/L (ref 132–148)
SODIUM SERPL-SCNC: 136 MMOL/L (ref 132–148)
SODIUM SERPL-SCNC: 143 MMOL/L (ref 132–148)
TOTAL CELLS COUNTED BLD: 100
WBC # BLD AUTO: 16.2 K/UL (ref 4.8–10.8)
WBC # BLD AUTO: 28.3 K/UL (ref 4.8–10.8)
WBC # BLD AUTO: 28.9 K/UL (ref 4.8–10.8)

## 2017-10-26 PROCEDURE — 0UT90ZZ RESECTION OF UTERUS, OPEN APPROACH: ICD-10-PCS | Performed by: SURGERY

## 2017-10-26 PROCEDURE — 0UT70ZZ RESECTION OF BILATERAL FALLOPIAN TUBES, OPEN APPROACH: ICD-10-PCS

## 2017-10-26 PROCEDURE — 30233N1 TRANSFUSION OF NONAUTOLOGOUS RED BLOOD CELLS INTO PERIPHERAL VEIN, PERCUTANEOUS APPROACH: ICD-10-PCS | Performed by: SURGERY

## 2017-10-26 PROCEDURE — 0DJD8ZZ INSPECTION OF LOWER INTESTINAL TRACT, VIA NATURAL OR ARTIFICIAL OPENING ENDOSCOPIC: ICD-10-PCS | Performed by: SURGERY

## 2017-10-26 PROCEDURE — 0UTC0ZZ RESECTION OF CERVIX, OPEN APPROACH: ICD-10-PCS

## 2017-10-26 PROCEDURE — 0UT20ZZ RESECTION OF BILATERAL OVARIES, OPEN APPROACH: ICD-10-PCS

## 2017-10-26 PROCEDURE — 0TJB8ZZ INSPECTION OF BLADDER, VIA NATURAL OR ARTIFICIAL OPENING ENDOSCOPIC: ICD-10-PCS

## 2017-10-26 PROCEDURE — 0UN10ZZ RELEASE LEFT OVARY, OPEN APPROACH: ICD-10-PCS | Performed by: SURGERY

## 2017-10-26 RX ADMIN — ALBUTEROL SULFATE SCH: 2.5 SOLUTION RESPIRATORY (INHALATION) at 23:53

## 2017-10-26 RX ADMIN — WATER SCH MLS/HR: 1 INJECTION INTRAMUSCULAR; INTRAVENOUS; SUBCUTANEOUS at 06:30

## 2017-10-26 RX ADMIN — ALBUTEROL SULFATE SCH MG: 2.5 SOLUTION RESPIRATORY (INHALATION) at 08:39

## 2017-10-26 RX ADMIN — TAZOBACTAM SODIUM AND PIPERACILLIN SODIUM SCH MLS: 375; 3 INJECTION, SOLUTION INTRAVENOUS at 17:15

## 2017-10-26 RX ADMIN — ALBUTEROL SULFATE SCH: 2.5 SOLUTION RESPIRATORY (INHALATION) at 03:51

## 2017-10-26 RX ADMIN — ALBUTEROL SULFATE SCH: 2.5 SOLUTION RESPIRATORY (INHALATION) at 11:38

## 2017-10-26 RX ADMIN — TAZOBACTAM SODIUM AND PIPERACILLIN SODIUM SCH MLS/HR: 375; 3 INJECTION, SOLUTION INTRAVENOUS at 00:00

## 2017-10-26 RX ADMIN — WATER SCH MLS: 1 INJECTION INTRAMUSCULAR; INTRAVENOUS; SUBCUTANEOUS at 17:00

## 2017-10-26 RX ADMIN — HUMAN INSULIN SCH UNIT: 100 INJECTION, SOLUTION SUBCUTANEOUS at 17:10

## 2017-10-26 RX ADMIN — ALBUTEROL SULFATE SCH MG: 2.5 SOLUTION RESPIRATORY (INHALATION) at 00:51

## 2017-10-26 RX ADMIN — VANCOMYCIN HYDROCHLORIDE SCH MLS/HR: 1 INJECTION, POWDER, LYOPHILIZED, FOR SOLUTION INTRAVENOUS at 21:00

## 2017-10-26 RX ADMIN — TAZOBACTAM SODIUM AND PIPERACILLIN SODIUM SCH MLS: 375; 3 INJECTION, SOLUTION INTRAVENOUS at 17:01

## 2017-10-26 RX ADMIN — TAZOBACTAM SODIUM AND PIPERACILLIN SODIUM SCH MLS/HR: 375; 3 INJECTION, SOLUTION INTRAVENOUS at 09:00

## 2017-10-26 RX ADMIN — WATER SCH MLS/HR: 1 INJECTION INTRAMUSCULAR; INTRAVENOUS; SUBCUTANEOUS at 23:00

## 2017-10-26 RX ADMIN — ALBUTEROL SULFATE SCH: 2.5 SOLUTION RESPIRATORY (INHALATION) at 17:05

## 2017-10-26 RX ADMIN — HYDROMORPHONE HYDROCHLORIDE PRN MG: 1 INJECTION, SOLUTION INTRAMUSCULAR; INTRAVENOUS; SUBCUTANEOUS at 15:24

## 2017-10-26 NOTE — PCM.SURG1
Surgeon's Initial Post Op Note





- Surgeon's Notes


Surgeon: kenji stuart


Assistant: georges quinn md


Type of Anesthesia: General Endo, Local


Pre-Operative Diagnosis: intraoperative consulatation.  Pyometria / uterine 

perforation.  Necrotic uterus.  Left adnexal mass


Operative Findings: I was called intraoperatively to consult for a this 77 y/o 

female which was intended to undergo a colectomy for possible bowel nectosis 

impaction, ischemia.  upon entry, the general surgeon noted a left adnexal mass 

with pyometria and uterine perforation.  Exam under anesthesia revealed normal 

appearing cervix, and adnexal fullness.  decision was made to complete a total 

abdominal hysterectomy and BSO and left adnexal rececsion.  a colonic 

involvement was noted as the left adnexal mass was attached and tightly 

adherent to the colon.  Caraful dissection of the adnexal mass and uterus from 

the colon enabled us to complete the hysterectomy.  Cystoscopy was completed 

following the procedure, and normal bladder anatomy noted. both uretes noted 

efluxing urine freely.  uterus necrotic with with left adnexal mass,.  

pyomyometria.  normal appearing right adnexa


Post-Operative Diagnosis: same


Operation Performed: Total abdominal hysterectomy BSO.  Excision of adnexal 

mass.  diagnostic cystoscopy


Specimen/Specimens Removed: uterus cervix tubes and ovaries.  left adenxal mass


Estimated Blood Loss: EBL {In ML}: 200


Blood Products Given: PRBC


Drains Used: No Drains


Post-Op Condition: Good


Date of Surgery/Procedure: 10/26/17


Time of Surgery/Procedure: 14:50

## 2017-10-26 NOTE — CP.PCM.PN
Subjective





- Date & Time of Evaluation


Date of Evaluation: 10/26/17


Time of Evaluation: 09:00





- Subjective


Subjective: 





events noted- pyometrium s/p KASSY 


cont iv rx 





Objective





- Vital Signs/Intake and Output


Vital Signs (last 24 hours): 


 











Temp Pulse Resp BP Pulse Ox


 


 100.2 F H  112 H  10 L  133/71   99 


 


 10/26/17 17:30  10/26/17 19:00  10/26/17 19:00  10/26/17 19:00  10/26/17 19:00








Intake and Output: 


 











 10/26/17 10/27/17





 18:59 06:59


 


Intake Total 3000 


 


Output Total 1035 


 


Balance 1965 














- Medications


Medications: 


 Current Medications





Acetaminophen (Tylenol 325mg Tab)  325 mg PO Q4 PRN


   PRN Reason: Fever >100.4 F


Albuterol Sulfate (Albuterol 0.083% Inhal Sol (2.5 Mg/3 Ml) Ud)  2.5 mg IH RQ4 

[facility]


   Last Admin: 10/26/17 17:05 Dose:  Not Given


Heparin Sodium (Porcine) (Heparin)  5,000 units SC Q12 [facility]


Hydromorphone HCl (Dilaudid)  0.5 mg IVP Q4H PRN


   PRN Reason: Pain, moderate (4-7)


   Last Admin: 10/26/17 15:24 Dose:  0.5 mg


Piperacillin Sod/Tazobactam Sod (Zosyn 3.375 Gm Iv Premix)  3.375 gm in 50 mls 

@ 200 mls/hr IVPB Q8H [facility]


   Last Admin: 10/26/17 17:15 Dose:  50 mls


Metronidazole (Flagyl)  500 mg in 100 mls @ 100 mls/hr IVPB Q8H [facility]


   Last Admin: 10/26/17 17:00 Dose:  100 mls


BUPIVACAINE 0.125%/0.9% NACL (Bupivacaine-Ns 0.125% On-Q )  600 mls @ 4 mls/

hr IJ ONCE ONE


   Stop: 11/01/17 18:14


   Last Admin: 10/26/17 14:50 Dose:  0 mls


Lactated Ringer's (Lactated Ringer's)  1,000 mls @ 105 mls/hr IV .Q9H32M [facility]


   Last Admin: 10/26/17 16:00 Dose:  1,000 mls


Insulin Human Regular (Novolin R)  0 unit SC ACHS [facility]


   PRN Reason: Protocol


   Last Admin: 10/26/17 17:10 Dose:  3 unit


Lactulose (Enulose)  20 gm PO Q6H [facility]


   Last Admin: 10/26/17 08:18 Dose:  Not Given


Ondansetron HCl (Zofran Inj)  4 mg IVP Q4 PRN


   PRN Reason: Nausea/Vomiting











- Labs


Labs: 


 





 10/26/17 16:12 





 10/26/17 16:12 





 











PT  15.5 SECONDS (9.7-12.2)  H  10/24/17  03:58    


 


INR  1.4   10/24/17  03:58    


 


APTT  28 SECONDS (21-34)   10/24/17  03:58    














- Constitutional


Appears: Chronically Ill





- Head Exam


Head Exam: NORMOCEPHALIC





- Eye Exam


Eye Exam: PERRL





- ENT Exam


ENT Exam: Mucous Membranes Dry





- Neck Exam


Neck Exam: absent: Lymphadenopathy





- Respiratory Exam


Respiratory Exam: Decreased Breath Sounds





- Cardiovascular Exam


Cardiovascular Exam: REGULAR RHYTHM





- GI/Abdominal Exam


GI & Abdominal Exam: Distended, Tenderness





- Rectal Exam


Rectal Exam: Deferred





Assessment and Plan


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


Status: Acute   





(2) Diabetes mellitus


Status: Chronic   





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


Status: Chronic

## 2017-10-26 NOTE — CP.PCM.PN
Subjective





- Date & Time of Evaluation


Date of Evaluation: 10/26/17


Time of Evaluation: 21:10





- Subjective


Subjective: 


Kept abreast of developments with patient. Surgery called early this morning 

and since there was no significant change in pt's status ecept a mild decline 

in WBC, it was deemed prudent to perform an exploratory laparotomy to ascertain 

definitively cause of pt's severe leucocytosis and infection. 


< Ubtraoperatively, pt found to have viable colon/rectosigmoid area, though 

still with signifiicant amount of retained stool. The previiously identified 

complex solid/cysic mass on CTyesterday on L parasagittal area turned out to be 

infected uterus with endometritis. Intra-op consult with Onco-Gynecology 

obtained and assistance provided intra-operatively. Pt had unscheduled 

hysterectomy, which is most likely source of leucocytosis intially detected 

last weekend at NH. . Pt successfully cosed up and after some time at PACU, 

transferred to ICU for closer monitoring.  





Objective





- Vital Signs/Intake and Output


Vital Signs (last 24 hours): 


 











Temp Pulse Resp BP Pulse Ox


 


 98.4 F   116 H  11 L  126/71   100 


 


 10/26/17 21:00  10/26/17 22:31  10/26/17 22:31  10/26/17 22:31  10/26/17 22:31








Intake and Output: 


 











 10/26/17 10/27/17





 18:59 06:59


 


Intake Total 3000 210


 


Output Total 1035 190


 


Balance 1965 20














- Medications


Medications: 


 Current Medications





Acetaminophen (Tylenol 325mg Tab)  325 mg PO Q4 PRN


   PRN Reason: Fever >100.4 F


Albuterol Sulfate (Albuterol 0.083% Inhal Sol (2.5 Mg/3 Ml) Ud)  2.5 mg IH RQ4 

[facility]


   Last Admin: 10/26/17 17:05 Dose:  Not Given


Heparin Sodium (Porcine) (Heparin)  5,000 units SC Q12 [facility]


Hydromorphone HCl (Dilaudid)  0.5 mg IVP Q4H PRN


   PRN Reason: Pain, moderate (4-7)


   Last Admin: 10/26/17 15:24 Dose:  0.5 mg


Piperacillin Sod/Tazobactam Sod (Zosyn 3.375 Gm Iv Premix)  3.375 gm in 50 mls 

@ 200 mls/hr IVPB Q8H [facility]


   Last Admin: 10/26/17 17:15 Dose:  50 mls


Metronidazole (Flagyl)  500 mg in 100 mls @ 100 mls/hr [facility]PB [facility]


   Last Admin: 10/26/17 17:00 Dose:  100 mls


BUPIVACAINE 0.125%/0.9% NACL (Bupivacaine-Ns 0.125% On-Q )  600 mls @ 4 mls/

hr IJ ONCE ONE


   Stop: 11/01/17 18:14


   Last Admin: 10/26/17 14:50 Dose:  0 mls


Lactated Ringer's (Lactated Ringer's)  1,000 mls @ 105 mls/hr IV .Q9H32M [facility]


   Last Admin: 10/26/17 16:00 Dose:  1,000 mls


Vancomycin/Sodium Chloride (Vancomycin 1 Gm/Ns 200 Ml)  1 gm in 200 mls @ 133 

mls/hr IVPB Q24H [facility]


   Stop: 10/31/17 20:01


Insulin Human Regular (Novolin R)  0 unit SC ACHS [facility]


   PRN Reason: Protocol


   Last Admin: 10/26/17 17:10 Dose:  3 unit


Lactulose (Enulose)  20 gm PO Q6H [facility]


   Last Admin: 10/26/17 08:18 Dose:  Not Given


Ondansetron HCl (Zofran Inj)  4 mg IVP Q4 PRN


   PRN Reason: Nausea/Vomiting











- Labs


Labs: 


 





 10/26/17 16:12 





 10/26/17 16:12 





 











PT  15.5 SECONDS (9.7-12.2)  H  10/24/17  03:58    


 


INR  1.4   10/24/17  03:58    


 


APTT  28 SECONDS (21-34)   10/24/17  03:58    














- Constitutional


Appears: No Acute Distress





- Head Exam


Head Exam: NORMAL INSPECTION, NORMOCEPHALIC





- Eye Exam


Eye Exam: Normal appearance


Pupil Exam: NORMAL ACCOMODATION





- ENT Exam


ENT Exam: Normal Exam





- Neck Exam


Neck Exam: Full ROM, Normal Inspection





- Respiratory Exam


Respiratory Exam: Clear to Ausculation Bilateral, NORMAL BREATHING PATTERN





- Cardiovascular Exam


Cardiovascular Exam: REGULAR RHYTHM





- GI/Abdominal Exam


GI & Abdominal Exam: Diminished Bowel Sounds





- Rectal Exam


Rectal Exam: Deferred





- Back Exam


Additional comments: 





+ pressure ulcers





- Neurological Exam


Neurological Exam: Motor Sensory Deficit (R sided)


Neuro motor strength exam: Left Upper Extremity: 4, Right Upper Extremity: 0, 

Left Lower Extremity: 4, Right Lower Extremity: 0





- Psychiatric Exam


Psychiatric exam: Normal Affect, Normal Mood





- Skin


Skin Exam: Dry, Intact, Normal Color, Warm





Assessment and Plan


(1) Endometritis


Assessment & Plan: 


on IV abx


Status: Acute   





(2) Fecal impaction


Assessment & Plan: 


etiology unknown, prob multifactorial with CVA and DM + non-ambulatory status 

all playing a big part


Status: Acute   





(3) Toxic megacolon


Assessment & Plan: 


will observe if able to move bowels. Suggest starting low dose 0metoclopramide 

if surgery agrees and no danger of dehiscence of any anastomoses that may have 

been created intra-op. 


Status: Acute   





(4) Colitis


Assessment & Plan: 


chronic, prob. nemo with adhesions and apparent near recto-uterine/recto-vaginal 

fistula noted intra-op


Status: Resolved   





(5) Diabetes mellitus


Assessment & Plan: 


check hgbA1c


Status: Chronic   





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


Assessment & Plan: 


passive ROM PT


Status: Chronic

## 2017-10-26 NOTE — CP.PCM.CON
History of Present Illness





- History of Present Illness


History of Present Illness: 





76 f with h/o DM, HTN presented to ER with abd pain, distension, constipation 

for 5 days on 7/24, found to have fecal impaction and distension of sigmoid 

about 9.5 cm, also noticed was complex cystic uterine/fibroid structure. 

Patient was disimpacted the next day, hydrated and abx started. The patient was 

taken to OR next day again with worsening labs reassess site of impaction. In 

OR she was found to have inflammed uterus and hence removed. Drop in hemoglobin 

was noticed, significant amount from hydration as albumin also decreased. Blood 

loss 150ml, patient transfused 1unit prbc. Post op patient is being observed in 

ICU. She has LIGIA drain, abd wall anesthesia for pain control, has spring.





PMH as above


PSH not abvailable


Med home and hospital reviewed,


Patient is sleepy but easily arousable





Review of Systems





- Review of Systems


All systems: reviewed and no additional remarkable complaints except (HPI)





Past Patient History





- Infectious Disease


Hx of Infectious Diseases: None





- Tetanus Immunizations


Tetanus Immunization: Unknown





- Past Medical History & Family History


Past Medical History?: Yes





- Past Social History


Smoking Status: Never Smoked


Alcohol: None


Drugs: Other (pt was undiagnosed diabetic until after admission for the stroke 

and until pt assigned to me at sub acute rehab )


Domestic Violence: Negative





- CARDIAC


Hx Hypercholesterolemia:  (Hyperlipidimia)


Hx Hypertension: Yes





- PULMONARY


Hx Respiratory Disorders: No





- NEUROLOGICAL


Hx Neurological Disorder: Yes


HX Cerebrovascular Accident: Yes





- HEENT


Other/Comment: DYSPHAGIA, PT HAS PEG





- RENAL


Hx Chronic Kidney Disease: No





- ENDOCRINE/METABOLIC


Hx Endocrine Disorders: Yes


Hx Diabetes Mellitus Type 2: Yes





- HEMATOLOGICAL/ONCOLOGICAL


Hx Blood Disorders: No





- INTEGUMENTARY


Hx Dermatological Problems: No





- MUSCULOSKELETAL/RHEUMATOLOGICAL


Hx Musculoskeletal Disorders: No


Hx Falls: No





- GASTROINTESTINAL


Hx Gastrointestinal Disorders: Yes


Other/Comment: PEG IN PLACE,  INCONTINENT OF STOOL





- GENITOURINARY/GYNECOLOGICAL


Hx Genitourinary Disorders: Yes


Other/Comment: INCONTINENT OF URINE,





- PSYCHIATRIC


Hx Psychophysiologic Disorder: No


Hx Substance Use: No





- SURGICAL HISTORY


Hx Surgeries: No





- ANESTHESIA


Hx Anesthesia: Yes





Meds


Allergies/Adverse Reactions: 


 Allergies











Allergy/AdvReac Type Severity Reaction Status Date / Time


 


No Known Allergies Allergy   Verified 10/24/17 06:20














- Medications


Medications: 


 Current Medications





Acetaminophen (Tylenol 325mg Tab)  325 mg PO Q4 PRN


   PRN Reason: Fever >100.4 F


Albuterol Sulfate (Albuterol 0.083% Inhal Sol (2.5 Mg/3 Ml) Ud)  2.5 mg IH RQ4 

[facility]


   Last Admin: 10/26/17 17:05 Dose:  Not Given


Heparin Sodium (Porcine) (Heparin)  5,000 units SC Q12 [facility]


Hydromorphone HCl (Dilaudid)  0.5 mg IVP Q4H PRN


   PRN Reason: Pain, moderate (4-7)


   Last Admin: 10/26/17 15:24 Dose:  0.5 mg


Piperacillin Sod/Tazobactam Sod (Zosyn 3.375 Gm Iv Premix)  3.375 gm in 50 mls 

@ 200 mls/hr IVPB Q8H [facility]


   Last Admin: 10/26/17 17:15 Dose:  50 mls


Metronidazole (Flagyl)  500 mg in 100 mls @ 100 mls/hr IVPB Q8H [facility]


   Last Admin: 10/26/17 17:00 Dose:  100 mls


BUPIVACAINE 0.125%/0.9% NACL (Bupivacaine-Ns 0.125% On-Q )  600 mls @ 4 mls/

hr IJ ONCE ONE


   Stop: 11/01/17 18:14


   Last Admin: 10/26/17 14:50 Dose:  0 mls


Lactated Ringer's (Lactated Ringer's)  1,000 mls @ 105 mls/hr IV .Q9H32M [facility]


   Last Admin: 10/26/17 16:00 Dose:  1,000 mls


Vancomycin/Sodium Chloride (Vancomycin 1 Gm/Ns 200 Ml)  1 gm in 200 mls @ 133 

mls/hr IVPB Q24H [facility]


   Stop: 10/31/17 20:01


Insulin Human Regular (Novolin R)  0 unit SC ACHS [facility]


   PRN Reason: Protocol


   Last Admin: 10/26/17 17:10 Dose:  3 unit


Lactulose (Enulose)  20 gm PO Q6H [facility]


   Last Admin: 10/26/17 08:18 Dose:  Not Given


Ondansetron HCl (Zofran Inj)  4 mg IVP Q4 PRN


   PRN Reason: Nausea/Vomiting











Physical Exam





- Additional Findings


Additional findings: 





* HEENT JUNIOR


* Neck supple


* Chest Clear, b/l


* CVS regular, no gallop or rub


* PA soft, LIGIA serosangious drainage, has abd wall anesthesia for pain control


* Ext no edema


* Skin dry, clinically dry


* CNS arousable, follows simple commands, moves all ext





Results





- Vital Signs


Recent Vital Signs: 


 Last Vital Signs











Temp  98.4 F   10/26/17 21:00


 


Pulse  116 H  10/26/17 22:31


 


Resp  11 L  10/26/17 22:31


 


BP  126/71   10/26/17 22:31


 


Pulse Ox  100   10/26/17 22:31














- Labs


Result Diagrams: 


 10/26/17 16:12





 10/26/17 16:12


Labs: 


 Laboratory Results - last 24 hr











  10/26/17 10/26/17 10/26/17





  07:12 07:12 07:27


 


WBC  28.9 H  


 


RBC  3.40 L  


 


Hgb  10.1 L  


 


Hct  30.2 L  


 


MCV  88.9  


 


MCH  29.7  


 


MCHC  33.4  


 


RDW  13.9  


 


Plt Count  249  


 


MPV  8.0  


 


Neut % (Auto)  92.8 H  


 


Lymph % (Auto)  2.5 L  


 


Mono % (Auto)  4.6  


 


Eos % (Auto)  0.0  


 


Baso % (Auto)  0.1  


 


Neut #  26.8 H  


 


Lymph #  0.7 L  


 


Mono #  1.3 H  


 


Eos #  0.0  


 


Baso #  0.0  


 


Neutrophils % (Manual)  88 H  


 


Band Neutrophils %  3 H  


 


Lymphocytes % (Manual)  3 L  


 


Monocytes % (Manual)  5  


 


Basophils % (Manual)  1  


 


Differential Comment   


 


Toxic Granulation  Present  


 


Platelet Estimate  Normal  


 


Hypochromasia (manual)  Slight  


 


Poikilocytosis (manual  Slight  


 


Anisocytosis (manual)  Slight  


 


Sodium   143 


 


Potassium   3.1 L 


 


Chloride   110 H 


 


Carbon Dioxide   22 


 


Anion Gap   14 


 


BUN   16 


 


Creatinine   0.9 


 


Est GFR ( Amer)   > 60 


 


Est GFR (Non-Af Amer)   > 60 


 


POC Glucose (mg/dL)    168 H


 


Random Glucose   138 H 


 


Calcium   8.3 L 


 


Total Bilirubin   0.4 


 


AST   21 


 


ALT   70 H D 


 


Alkaline Phosphatase   82 


 


Total Protein   6.1 L 


 


Albumin   2.3 L D 


 


Globulin   3.7 


 


Albumin/Globulin Ratio   0.6 L 


 


Stool Occult Blood   


 


C. difficile Ag & Toxin   


 


Blood Type   


 


Antibody Screen   














  10/26/17 10/26/17 10/26/17





  11:25 13:14 13:14


 


WBC   16.2 H 


 


RBC   2.46 L 


 


Hgb   7.3 L D 


 


Hct   22.7 L 


 


MCV   92.0  D 


 


MCH   29.6 


 


MCHC   32.1 L 


 


RDW   14.1 


 


Plt Count   221 


 


MPV   8.0 


 


Neut % (Auto)   


 


Lymph % (Auto)   


 


Mono % (Auto)   


 


Eos % (Auto)   


 


Baso % (Auto)   


 


Neut #   


 


Lymph #   


 


Mono #   


 


Eos #   


 


Baso #   


 


Neutrophils % (Manual)   


 


Band Neutrophils %   


 


Lymphocytes % (Manual)   


 


Monocytes % (Manual)   


 


Basophils % (Manual)   


 


Differential Comment   


 


Toxic Granulation   


 


Platelet Estimate   


 


Hypochromasia (manual)   


 


Poikilocytosis (manual   


 


Anisocytosis (manual)   


 


Sodium    130 L


 


Potassium    3.8


 


Chloride    106


 


Carbon Dioxide    11 L* D


 


Anion Gap    17


 


BUN    9


 


Creatinine    0.5 L


 


Est GFR ( Amer)    > 60


 


Est GFR (Non-Af Amer)    > 60


 


POC Glucose (mg/dL)   


 


Random Glucose    108 H


 


Calcium    6.8 L


 


Total Bilirubin   


 


AST   


 


ALT   


 


Alkaline Phosphatase   


 


Total Protein   


 


Albumin   


 


Globulin   


 


Albumin/Globulin Ratio   


 


Stool Occult Blood  Negative  


 


C. difficile Ag & Toxin   


 


Blood Type   


 


Antibody Screen   














  10/26/17 10/26/17 10/26/17





  13:14 16:12 16:12


 


WBC   28.3 H D 


 


RBC   4.03 


 


Hgb   11.8  D 


 


Hct   36.5 


 


MCV   90.6 


 


MCH   29.3 


 


MCHC   32.4 L 


 


RDW   14.2 


 


Plt Count   250 


 


MPV   7.7 


 


Neut % (Auto)   


 


Lymph % (Auto)   


 


Mono % (Auto)   


 


Eos % (Auto)   


 


Baso % (Auto)   


 


Neut #   


 


Lymph #   


 


Mono #   


 


Eos #   


 


Baso #   


 


Neutrophils % (Manual)   


 


Band Neutrophils %   


 


Lymphocytes % (Manual)   


 


Monocytes % (Manual)   


 


Basophils % (Manual)   


 


Differential Comment    


 


Toxic Granulation   


 


Platelet Estimate   


 


Hypochromasia (manual)   


 


Poikilocytosis (manual   


 


Anisocytosis (manual)   


 


Sodium    136


 


Potassium    3.8


 


Chloride    106


 


Carbon Dioxide    17 L


 


Anion Gap    17


 


BUN    14


 


Creatinine    0.8


 


Est GFR ( Amer)    > 60


 


Est GFR (Non-Af Amer)    > 60


 


POC Glucose (mg/dL)   


 


Random Glucose    193 H


 


Calcium    7.9 L


 


Total Bilirubin   


 


AST   


 


ALT   


 


Alkaline Phosphatase   


 


Total Protein   


 


Albumin   


 


Globulin   


 


Albumin/Globulin Ratio   


 


Stool Occult Blood   


 


C. difficile Ag & Toxin   


 


Blood Type  B POSITIVE  


 


Antibody Screen  Negative  














  10/26/17 10/26/17 10/26/17





  16:32 21:12 Unknown


 


WBC   


 


RBC   


 


Hgb   


 


Hct   


 


MCV   


 


MCH   


 


MCHC   


 


RDW   


 


Plt Count   


 


MPV   


 


Neut % (Auto)   


 


Lymph % (Auto)   


 


Mono % (Auto)   


 


Eos % (Auto)   


 


Baso % (Auto)   


 


Neut #   


 


Lymph #   


 


Mono #   


 


Eos #   


 


Baso #   


 


Neutrophils % (Manual)   


 


Band Neutrophils %   


 


Lymphocytes % (Manual)   


 


Monocytes % (Manual)   


 


Basophils % (Manual)   


 


Differential Comment   


 


Toxic Granulation   


 


Platelet Estimate   


 


Hypochromasia (manual)   


 


Poikilocytosis (manual   


 


Anisocytosis (manual)   


 


Sodium   


 


Potassium   


 


Chloride   


 


Carbon Dioxide   


 


Anion Gap   


 


BUN   


 


Creatinine   


 


Est GFR ( Amer)   


 


Est GFR (Non-Af Amer)   


 


POC Glucose (mg/dL)  230 H  214 H 


 


Random Glucose   


 


Calcium   


 


Total Bilirubin   


 


AST   


 


ALT   


 


Alkaline Phosphatase   


 


Total Protein   


 


Albumin   


 


Globulin   


 


Albumin/Globulin Ratio   


 


Stool Occult Blood   


 


C. difficile Ag & Toxin    Negative


 


Blood Type   


 


Antibody Screen   














Assessment & Plan





- Assessment and Plan (Free Text)


Assessment: 





* S/p uterine resection for infection from adjacent sigmoid wit fecal impaction 

or vice versa


* Clinical dehydration


* Anemia and blood loss


* Uncontrolled Dm due to stress


* H/o htn


Plan: 





* Abx covering colonic bact


* DM control


* IVF


* GI/DVT prophylaxis


* pain control


* See orders for detail.

## 2017-10-26 NOTE — PCM.SURG1
Surgeon's Initial Post Op Note





- Surgeon's Notes


Surgeon: Lisa Garcia


Assistant: Cassandra PGY3, Orin PGY2


Type of Anesthesia: General Endo, Local


Pre-Operative Diagnosis: Colitis 2/2 fecal rention


Operative Findings: Uterus w. focal area of necrosis and copious amounts of pus


Post-Operative Diagnosis: uterine infection


Operation Performed: ex-lap w. total hysterectomy w. BSO


Specimen/Specimens Removed: uterus, B/L ovaries


Estimated Blood Loss: EBL {In ML}: 150


Blood Products Given: PRBC (1 Unit)


Drains Used: [employer]


Post-Op Condition: Good


Date of Surgery/Procedure: 10/26/17


Time of Surgery/Procedure: 14:50

## 2017-10-26 NOTE — RAD
PROCEDURE:  



HISTORY:





COMPARISON:

None



TECHNIQUE:

Total fluoroscopic time utilized during the procedure:  4.8 ;  0.66   

mGy cm 2



FINDINGS:

Submitted images from the current procedure: 2 



Please refer to the  physician's notes performing the procedure. 



IMPRESSION:

Less than 1 hour fluoroscopic time utilized during performance of the 

procedure

## 2017-10-27 LAB
ALBUMIN/GLOB SERPL: 1 {RATIO} (ref 1–2.1)
ALP SERPL-CCNC: 68 U/L (ref 38–126)
ALT SERPL-CCNC: 41 U/L (ref 9–52)
AST SERPL-CCNC: 33 U/L (ref 14–36)
BASOPHILS # BLD AUTO: 0 K/UL (ref 0–0.2)
BASOPHILS NFR BLD: 0 % (ref 0–2)
BILIRUB SERPL-MCNC: 0.9 MG/DL (ref 0.2–1.3)
BUN SERPL-MCNC: 13 MG/DL (ref 7–17)
CALCIUM SERPL-MCNC: 7.7 MG/DL (ref 8.6–10.4)
CHLORIDE SERPL-SCNC: 108 MMOL/L (ref 98–107)
CO2 SERPL-SCNC: 17 MMOL/L (ref 22–30)
EOSINOPHIL # BLD AUTO: 0 K/UL (ref 0–0.7)
EOSINOPHIL NFR BLD: 0 % (ref 0–4)
ERYTHROCYTE [DISTWIDTH] IN BLOOD BY AUTOMATED COUNT: 14 % (ref 11.5–14.5)
GLOBULIN SER-MCNC: 2.4 GM/DL (ref 2.2–3.9)
GLUCOSE SERPL-MCNC: 124 MG/DL (ref 65–105)
HCT VFR BLD CALC: 35.3 % (ref 34–47)
LYMPHOCYTES # BLD AUTO: 0.6 K/UL (ref 1–4.3)
LYMPHOCYTES NFR BLD AUTO: 2.3 % (ref 20–40)
MAGNESIUM SERPL-MCNC: 1.2 MG/DL (ref 1.6–2.3)
MCH RBC QN AUTO: 29.2 PG (ref 27–31)
MCHC RBC AUTO-ENTMCNC: 32.4 G/DL (ref 33–37)
MCV RBC AUTO: 89.9 FL (ref 81–99)
MONOCYTES # BLD: 0.9 K/UL (ref 0–0.8)
MONOCYTES NFR BLD: 3.3 % (ref 0–10)
NEUTROPHILS NFR BLD AUTO: 84 % (ref 50–75)
NRBC BLD AUTO-RTO: 0.1 % (ref 0–2)
PHOSPHATE SERPL-MCNC: 2.2 MG/DL (ref 2.5–4.5)
PLATELET # BLD: 239 K/UL (ref 130–400)
PMV BLD AUTO: 8.4 FL (ref 7.2–11.7)
POTASSIUM SERPL-SCNC: 4.2 MMOL/L (ref 3.6–5.2)
PROT SERPL-MCNC: 4.7 G/DL (ref 6.3–8.3)
SODIUM SERPL-SCNC: 136 MMOL/L (ref 132–148)
TOTAL CELLS COUNTED BLD: 100
WBC # BLD AUTO: 25.9 K/UL (ref 4.8–10.8)

## 2017-10-27 RX ADMIN — VANCOMYCIN HYDROCHLORIDE SCH MLS/HR: 1 INJECTION, POWDER, LYOPHILIZED, FOR SOLUTION INTRAVENOUS at 20:44

## 2017-10-27 RX ADMIN — POTASSIUM & SODIUM PHOSPHATES POWDER PACK 280-160-250 MG SCH: 280-160-250 PACK at 14:28

## 2017-10-27 RX ADMIN — MAGNESIUM SULFATE IN DEXTROSE SCH MLS/HR: 10 INJECTION, SOLUTION INTRAVENOUS at 14:24

## 2017-10-27 RX ADMIN — HUMAN INSULIN SCH: 100 INJECTION, SOLUTION SUBCUTANEOUS at 21:38

## 2017-10-27 RX ADMIN — HUMAN INSULIN SCH UNIT: 100 INJECTION, SOLUTION SUBCUTANEOUS at 17:23

## 2017-10-27 RX ADMIN — TAZOBACTAM SODIUM AND PIPERACILLIN SODIUM SCH MLS/HR: 375; 3 INJECTION, SOLUTION INTRAVENOUS at 00:21

## 2017-10-27 RX ADMIN — ALBUTEROL SULFATE SCH: 2.5 SOLUTION RESPIRATORY (INHALATION) at 04:00

## 2017-10-27 RX ADMIN — HUMAN INSULIN SCH: 100 INJECTION, SOLUTION SUBCUTANEOUS at 13:28

## 2017-10-27 RX ADMIN — WATER SCH MLS/HR: 1 INJECTION INTRAMUSCULAR; INTRAVENOUS; SUBCUTANEOUS at 07:03

## 2017-10-27 RX ADMIN — WATER SCH MLS/HR: 1 INJECTION INTRAMUSCULAR; INTRAVENOUS; SUBCUTANEOUS at 15:57

## 2017-10-27 RX ADMIN — HYDROMORPHONE HYDROCHLORIDE PRN MG: 1 INJECTION, SOLUTION INTRAMUSCULAR; INTRAVENOUS; SUBCUTANEOUS at 13:55

## 2017-10-27 RX ADMIN — ALBUTEROL SULFATE SCH MG: 2.5 SOLUTION RESPIRATORY (INHALATION) at 16:41

## 2017-10-27 RX ADMIN — TAZOBACTAM SODIUM AND PIPERACILLIN SODIUM SCH MLS/HR: 375; 3 INJECTION, SOLUTION INTRAVENOUS at 08:46

## 2017-10-27 RX ADMIN — HUMAN INSULIN SCH: 100 INJECTION, SOLUTION SUBCUTANEOUS at 08:21

## 2017-10-27 RX ADMIN — WATER SCH MLS/HR: 1 INJECTION INTRAMUSCULAR; INTRAVENOUS; SUBCUTANEOUS at 22:58

## 2017-10-27 RX ADMIN — HUMAN INSULIN SCH: 100 INJECTION, SOLUTION SUBCUTANEOUS at 00:17

## 2017-10-27 RX ADMIN — ALBUTEROL SULFATE SCH MG: 2.5 SOLUTION RESPIRATORY (INHALATION) at 20:08

## 2017-10-27 RX ADMIN — ALBUTEROL SULFATE SCH MG: 2.5 SOLUTION RESPIRATORY (INHALATION) at 08:15

## 2017-10-27 RX ADMIN — ALBUTEROL SULFATE SCH MG: 2.5 SOLUTION RESPIRATORY (INHALATION) at 11:52

## 2017-10-27 RX ADMIN — POTASSIUM & SODIUM PHOSPHATES POWDER PACK 280-160-250 MG SCH PKT: 280-160-250 PACK at 17:25

## 2017-10-27 RX ADMIN — MAGNESIUM SULFATE IN DEXTROSE SCH MLS/HR: 10 INJECTION, SOLUTION INTRAVENOUS at 14:58

## 2017-10-27 RX ADMIN — TAZOBACTAM SODIUM AND PIPERACILLIN SODIUM SCH MLS/HR: 375; 3 INJECTION, SOLUTION INTRAVENOUS at 17:21

## 2017-10-27 RX ADMIN — POTASSIUM & SODIUM PHOSPHATES POWDER PACK 280-160-250 MG SCH PKT: 280-160-250 PACK at 15:18

## 2017-10-27 NOTE — OP
PROCEDURE DATE:  10/26/2017



PREOPERATIVE DIAGNOSIS:  Fecal impaction with sepsis.



POSTOPERATIVE DIAGNOSIS:  Infected uterus.



PROCEDURE CARRIED OUT:  Exploratory laparotomy and lysis of adhesions by me

and then Dr. Gonzalez carried out a total abdominal hysterectomy with

cystoscopy and then I carried out a sigmoidoscopy at the end.



SURGEONS:  Peng Garcia Jr., MD



INDICATIONS:  The patient is an elderly woman admitted from nursing home

with sepsis and a massively distended colon with fecal impaction.  She was

taken to the operating room 2 days ago, fecal disimpaction was carried out

and we were unable to completely decompress this.  The following day, her

white count went up to 30,000.  We continued to monitor her.  Today, her

white count was slightly down, but still over 20,000.  Because of this,

they recommended she needs to be explored.



DESCRIPTION OF PROCEDURE:  On abdominal exploration, the colon was no

longer fully distended.  It had decompressed a great deal.  The gallbladder

and rest of the abdomen were basically unremarkable.  The major finding was

the uterus was distended, inflamed and leaking pus from the left cornu.  In

addition, vaginal exam revealed a lot of pus coming out of the vagina. 

Because of this, I summoned back to Dr. Gonzalez who did carry out an

emergency hysterectomy given the circumstances, the patient's sepsis, etc. 

The details of this will be admitted by him.  After this had been done, he

carried out the cystoscopy and inspected the bladder, which did not show

any signs of inflammation or perforation or involvement of tumor.  I then

carried out a sigmoidoscopy with the colon compressed to make sure that

there was no injury to the rectal portion of the colon and no evidence of

any perforation.  After all these had been done, we then closed the abdomen

with running sutures of Novafil and PDS and closed the skin with skin clips

and ON-Q pain system was installed.  The patient tolerated the procedure

well.  Blood loss was over 500 mL.  She required transfusions.  In

addition, although the sponge and needle counts were correct at the end, we

took a flat film of the abdomen using portable equipment which did not show

evidence of any laparotomy pads, sponges, or instruments or needles.



__________________________________________

Peng Garcia Jr., MD



cc:  Dr. TAVON Contreras



DD:  10/26/2017 14:40:55

DT:  10/26/2017 16:30:45

Job # [id number]

## 2017-10-27 NOTE — CP.PCM.PN
Subjective





- Date & Time of Evaluation


Date of Evaluation: 10/27/17


Time of Evaluation: 20:41





- Subjective


Subjective: 


Pt reamins quiet and had no acute events since surgery.  No po yet, IVF 

running. no complaints. No signs of distress.


> Pt non-verbal 2ndry to stroke seeral years ago which rendered her witout 

intelligible speech, but patient understands what is asked of her and is able 

to answer with nods and shakes of head, and attempts to emulate words. 


> Family had given consent for the procedures pt underwent yesterday. 





Objective





- Vital Signs/Intake and Output


Vital Signs (last 24 hours): 


 











Temp Pulse Resp BP Pulse Ox


 


 99.3 F   116 H  19   125/72   98 


 


 10/27/17 16:00  10/27/17 18:31  10/27/17 18:31  10/27/17 18:31  10/27/17 18:31








Intake and Output: 


 











 10/27/17 10/28/17





 18:59 06:59


 


Intake Total 775 


 


Output Total 420 530


 


Balance 355 -530














- Medications


Medications: 


 Current Medications





Acetaminophen (Tylenol 325mg Tab)  325 mg PO Q4 PRN


   PRN Reason: Fever >100.4 F


Albuterol Sulfate (Albuterol 0.083% Inhal Sol (2.5 Mg/3 Ml) Ud)  2.5 mg IH RQ4 

MANDIE


   Last Admin: 10/27/17 20:08 Dose:  2.5 mg


Heparin Sodium (Porcine) (Heparin)  5,000 units SC Q12 MANDIE


Hydromorphone HCl (Dilaudid)  0.5 mg IVP Q4H PRN


   PRN Reason: Pain, moderate (4-7)


   Last Admin: 10/27/17 13:55 Dose:  0.5 mg


Piperacillin Sod/Tazobactam Sod (Zosyn 3.375 Gm Iv Premix)  3.375 gm in 50 mls 

@ 200 mls/hr IVPB Q8H [facility]


   Last Admin: 10/27/17 17:21 Dose:  200 mls/hr


Metronidazole (Flagyl)  500 mg in 100 mls @ 100 mls/hr IVPB Q8H [facility]


   Last Admin: 10/27/17 15:57 Dose:  100 mls/hr


BUPIVACAINE 0.125%/0.9% NACL (Bupivacaine-Ns 0.125% On-Q )  600 mls @ 4 mls/

hr IJ ONCE ONE


   Stop: 11/01/17 18:14


   Last Admin: 10/26/17 14:50 Dose:  0 mls


Lactated Ringer's (Lactated Ringer's)  1,000 mls @ 105 mls/hr IV .Q9H32M [facility]


   Last Admin: 10/27/17 20:31 Dose:  Not Given


Vancomycin/Sodium Chloride (Vancomycin 1 Gm/Ns 200 Ml)  1 gm in 200 mls @ 133 

mls/hr IVPB Q24H [facility]


   Stop: 10/31/17 20:01


   Last Admin: 10/26/17 21:00 Dose:  133 mls/hr


Insulin Human Regular (Novolin R)  0 unit SC ACHS MANDIE


   PRN Reason: Protocol


   Last Admin: 10/27/17 17:23 Dose:  4 unit


Lactulose (Enulose)  20 gm PO Q6H [facility]


   Last Admin: 10/27/17 15:17 Dose:  20 gm


Ondansetron HCl (Zofran Inj)  4 mg IVP Q4 PRN


   PRN Reason: Nausea/Vomiting


Potassium Phos/Sodium Phos (Neutra-Phos)  1 pkt PO TID [facility]


   Last Admin: 10/27/17 17:25 Dose:  1 pkt











- Labs


Labs: 


 





 10/27/17 06:53 





 10/27/17 06:53 





 











PT  15.5 SECONDS (9.7-12.2)  H  10/24/17  03:58    


 


INR  1.4   10/24/17  03:58    


 


APTT  28 SECONDS (21-34)   10/24/17  03:58    














- Constitutional


Appears: No Acute Distress





- Head Exam


Head Exam: NORMAL INSPECTION, NORMOCEPHALIC





- Eye Exam


Eye Exam: Normal appearance


Pupil Exam: NORMAL ACCOMODATION





- ENT Exam


ENT Exam: Mucous Membranes Moist, Normal Exam





- Neck Exam


Neck Exam: Normal Inspection





- Respiratory Exam


Respiratory Exam: Clear to Ausculation Bilateral, NORMAL BREATHING PATTERN





- Cardiovascular Exam


Cardiovascular Exam: REGULAR RHYTHM





- GI/Abdominal Exam


GI & Abdominal Exam: Hypoactive Bowel Sounds





- Rectal Exam


Rectal Exam: Deferred





- Extremities Exam


Extremities Exam: Normal Capillary Refill, Normal Inspection





- Back Exam


Back Exam: NORMAL INSPECTION





- Psychiatric Exam


Psychiatric exam: Flat Affect





- Skin


Skin Exam: Dry, Normal Color, Warm


Additional comments: 





+ decubitus ulcer on admission at posteriori superior iliac spine, R





Assessment and Plan


(1) Endometritis


Assessment & Plan: 


on multiple IV antibiotics, check vanco levels after 3 days


Status: Acute   





(2) Fecal impaction


Assessment & Plan: 


check via  film in AM


Status: Acute   





(3) Toxic megacolon


Assessment & Plan: 


redundancy not present before. need chronic stool softener once pt starts eating


Status: Acute   





(4) Colitis


Assessment & Plan: 


not present


Status: Resolved   





(5) Diabetes mellitus


Assessment & Plan: 


BS running high but not outrageously so. will monitor and restart meds when pt 

more alert


Status: Chronic   





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


Assessment & Plan: 


R sided hemiparesis


Status: Chronic

## 2017-10-27 NOTE — CP.PCM.PN
Subjective





- Date & Time of Evaluation


Date of Evaluation: 10/27/17


Time of Evaluation: 10:12





- Subjective


Subjective: 





Surgery: Dr. Garcia





Pt seen and examined. Resting comfortably in bed. No acute events over night. 





Objective





- Vital Signs/Intake and Output


Vital Signs (last 24 hours): 


 











Temp Pulse Resp BP Pulse Ox


 


 98.6 F   118 H  19   152/80 H  99 


 


 10/27/17 04:00  10/27/17 05:31  10/27/17 05:31  10/27/17 05:31  10/27/17 05:31








Intake and Output: 


 











 10/27/17 10/27/17





 06:59 18:59


 


Intake Total 1200 


 


Output Total 650 


 


Balance 550 














- Medications


Medications: 


 Current Medications





Acetaminophen (Tylenol 325mg Tab)  325 mg PO Q4 PRN


   PRN Reason: Fever >100.4 F


Albuterol Sulfate (Albuterol 0.083% Inhal Sol (2.5 Mg/3 Ml) Ud)  2.5 mg IH RQ4 

MANDIE


   Last Admin: 10/27/17 08:15 Dose:  2.5 mg


Heparin Sodium (Porcine) (Heparin)  5,000 units SC Q12 M[facility]IE


Hydromorphone HCl (Dilaudid)  0.5 mg IVP Q4H PRN


   PRN Reason: Pain, moderate (4-7)


   Last Admin: 10/26/17 15:24 Dose:  0.5 mg


Piperacillin Sod/Tazobactam Sod (Zosyn 3.375 Gm Iv Premix)  3.375 gm in 50 mls 

@ 200 mls/hr IVPB Q8H [facility]


   Last Admin: 10/27/17 08:46 Dose:  200 mls/hr


Metronidazole (Flagyl)  500 mg in 100 mls @ 100 mls/hr IVPB Q8H [facility]


   Last Admin: 10/27/17 07:03 Dose:  100 mls/hr


BUPIVACAINE 0.125%/0.9% NACL (Bupivacaine-Ns 0.125% On-Q )  600 mls @ 4 mls/

hr IJ ONCE ONE


   Stop: 11/01/17 18:14


   Last Admin: 10/26/17 14:50 Dose:  0 mls


Lactated Ringer's (Lactated Ringer's)  1,000 mls @ 105 mls/hr IV .Q9H32M [facility]


   Last Admin: 10/27/17 00:18 Dose:  105 mls/hr


Vancomycin/Sodium Chloride (Vancomycin 1 Gm/Ns 200 Ml)  1 gm in 200 mls @ 133 

mls/hr IVPB Q24H [facility]


   Stop: 10/31/17 20:01


   Last Admin: 10/26/17 21:00 Dose:  133 mls/hr


Insulin Human Regular (Novolin R)  0 unit SC ACHS [facility]


   PRN Reason: Protocol


   Last Admin: 10/27/17 08:21 Dose:  Not Given


Lactulose (Enulose)  20 gm PO Q6H [facility]


   Last Admin: 10/27/17 08:29 Dose:  20 gm


Ondansetron HCl (Zofran Inj)  4 mg IVP Q4 PRN


   PRN Reason: Nausea/Vomiting


Potassium Phos/Sodium Phos (Neutra-Phos)  1 pkt PO TID [facility]











- Labs


Labs: 


 





 10/27/17 06:53 





 10/27/17 06:53 





 











PT  15.5 SECONDS (9.7-12.2)  H  10/24/17  03:58    


 


INR  1.4   10/24/17  03:58    


 


APTT  28 SECONDS (21-34)   10/24/17  03:58    














- Constitutional


Appears: Non-toxic, No Acute Distress





- Head Exam


Head Exam: ATRAUMATIC, NORMOCEPHALIC





- Eye Exam


Eye Exam: EOMI





- ENT Exam


ENT Exam: Mucous Membranes Moist





- Neck Exam


Neck Exam: Full ROM





- Respiratory Exam


Respiratory Exam: NORMAL BREATHING PATTERN.  absent: Accessory Muscle Use, 

Respiratory Distress





- GI/Abdominal Exam


GI & Abdominal Exam: Soft, Tenderness (mild), Rebound.  absent: Distended, Firm

, Guarding, Rigid


Additional comments: 





carlos alberto in place





- Extremities Exam


Extremities Exam: absent: Calf Tenderness, Pedal Edema





- Neurological Exam


Neurological Exam: Alert, Awake, Oriented x3





Assessment and Plan





- Assessment and Plan (Free Text)


Assessment: 





76F w. uterine infection  / L adenexal mass, s/p KASSY w. BSO, POD#1


-mild decrease in leukocytosis, c/w abx


-leave spring in place for minimum 1 week


-d/c NGT, will start CLD


-pain management


-f/u on path results


-d/w attending





[facility]emait[facility] PGY3

## 2017-10-27 NOTE — CP.CCUPN
<Sharron Gagnon - Last Filed: 10/27/17 11:30>





CCU Subjective





- Physician Review


Subjective (Free Text): 





Patient examined at bedside. Pt is POD #1 s/p KASSY w/ BSO. Tolerating liquid 

diet. Unable to obtain ROS due to language barrier.





CCU Objective





- Vital Signs / Intake & Output


Intake and Output (Last 8hrs): 


 Intake & Output











 10/26/17 10/27/17 10/27/17





 22:59 06:59 14:59


 


Intake Total 210 990 


 


Output Total 190 460 


 


Balance 20 530 


 


Intake:   


 


  Intake, IV Amount 210 990 


 


    Right Forearm 210 990 


 


Output:   


 


  Gastric Drainage 10  


 


  Drainage 10 60 


 


    Abdomen  60 


 


  Urine 170 400 


 


    Urethral (Ambrosio) 100 400 














- Physical Exam


Head: Positive for: Atraumatic, Normocephalic


Extroacular Muscles: Positive for: EOMI


Conjunctiva: Positive for: Normal


Mouth: Positive for: Moist Mucous Membranes


Respiratory/Chest: Positive for: Good Air Exchange.  Negative for: Accessory 

Muscle Use


Cardiovascular: Positive for: Normal S1, S2, Tachycardic


Abdomen: Positive for: Tenderness (appropriate s/p surgery ), Other (dressing c/

d/i)


Lower Extremity: Positive for: Normal Inspection.  Negative for: Edema


Skin: Positive for: Warm, Normal Color





- Medications


Active Medications: 


Active Medications











Generic Name Dose Route Start Last Admin





  Trade Name Freq  PRN Reason Stop Dose Admin


 


Acetaminophen  325 mg  10/24/17 18:21  





  Tylenol 325mg Tab  PO   





  Q4 PRN   





  Fever >100.4 F   


 


Albuterol Sulfate  2.5 mg  10/24/17 18:30  10/27/17 08:15





  Albuterol 0.083% Inhal Sol (2.5 Mg/3 Ml) Ud  IH   2.5 mg





  RQ4 MANDIE   Administration


 


Heparin Sodium (Porcine)  5,000 units  10/27/17 22:00  





  Heparin  SC   





  Q12 MANDIE   


 


Hydromorphone HCl  0.5 mg  10/26/17 14:57  10/26/17 15:24





  Dilaudid  IVP   0.5 mg





  Q4H PRN   Administration





  Pain, moderate (4-7)   


 


Piperacillin Sod/Tazobactam Sod  3.375 gm in 50 mls @ 200 mls/hr  10/24/17 08:

00  10/27/17 08:46





  Zosyn 3.375 Gm Iv Premix  IVPB   200 mls/hr





  Q8H MANDIE   Administration


 


Metronidazole  500 mg in 100 mls @ 100 mls/hr  10/24/17 07:00  10/27/17 07:03





  Flagyl  IVPB   100 mls/hr





  Q8H MANDIE   Administration


 


BUPIVACAINE 0.125%/0.9% NACL  600 mls @ 4 mls/hr  10/26/17 12:15  10/26/17 14:50





  Bupivacaine-Ns 0.125% On-Q   IJ  11/01/17 18:14  0 mls





  ONCE ONE   Administration


 


Lactated Ringer's  1,000 mls @ 105 mls/hr  10/26/17 15:00  10/27/17 00:18





  Lactated Ringer's  IV   105 mls/hr





  .[id number] MANDIE   Administration


 


Vancomycin/Sodium Chloride  1 gm in 200 mls @ 133 mls/hr  10/26/17 20:00  10/26/

17 21:00





  Vancomycin 1 Gm/Ns 200 Ml  IVPB  10/31/17 20:01  133 mls/hr





  Q24H MANDIE   Administration


 


Insulin Human Regular  0 unit  10/26/17 22:00  10/27/17 08:21





  Novolin R  SC   Not Given





  [facility]   





  Protocol   


 


Lactulose  20 gm  10/25/17 19:45  10/27/17 08:29





  Enulose  PO   20 gm





  Q6H MANDIE   Administration


 


Ondansetron HCl  4 mg  10/24/17 12:00  





  Zofran Inj  IVP   





  Q4 PRN   





  Nausea/Vomiting   


 


Potassium Phos/Sodium Phos  1 pkt  10/27/17 10:00  





  Neutra-Phos  PO   





  TID [facility]   














- Patient Studies


Lab Studies: 


 Microbiology Studies











 10/24/17 03:30 Blood Culture - Preliminary





 Blood    NO GROWTH AFTER 3 DAYS


 


 10/26/17 14:31 Gram Stain - Final





 Abscess - Uterus Wound Culture - Preliminary





    Gram Negative Jonny


 


 10/25/17 21:00 Blood Culture - Preliminary





 Blood-Venous    NO GROWTH AFTER 24 HOURS


 


 10/25/17 21:30 Blood Culture - Preliminary





 Blood-Venous    NO GROWTH AFTER 24 HOURS


 


 10/24/17 14:59 Gram Stain - Final





 Body Fluid - Vagina Body Fluid Culture - Final


 


 10/24/17 03:00 S.aureus & Coag-Neg Staph PNA FISH - Final





 Blood Blood Culture - Preliminary





    Gram Positive Cocci





 Gram Stain - Final








 Lab Studies











  10/27/17 10/27/17 10/27/17 Range/Units





  08:14 06:53 06:53 


 


WBC    25.9 H  (4.8-10.8)  K/uL


 


RBC    3.93  (3.80-5.20)  Mil/uL


 


Hgb    11.5  (11.0-16.0)  g/dL


 


Hct    35.3  (34.0-47.0)  %


 


MCV    89.9  (81.0-99.0)  fL


 


MCH    29.2  (27.0-31.0)  pg


 


MCHC    32.4 L  (33.0-37.0)  g/dL


 


RDW    14.0  (11.5-14.5)  %


 


Plt Count    239  (130-400)  K/uL


 


MPV    8.4  (7.2-11.7)  fL


 


Neut % (Auto)    94.4 H  (50.0-75.0)  %


 


Lymph % (Auto)    2.3 L  (20.0-40.0)  %


 


Mono % (Auto)    3.3  (0.0-10.0)  %


 


Eos % (Auto)    0.0  (0.0-4.0)  %


 


Baso % (Auto)    0.0  (0.0-2.0)  %


 


Neut #    24.5 H  (1.8-7.0)  K/uL


 


Lymph #    0.6 L  (1.0-4.3)  K/uL


 


Mono #    0.9 H  (0.0-0.8)  K/uL


 


Eos #    0.0  (0.0-0.7)  K/uL


 


Baso #    0.0  (0.0-0.2)  K/uL


 


Neutrophils % (Manual)    84 H  (50-75)  %


 


Band Neutrophils %    12 H*  (0-2)  %


 


Lymphocytes % (Manual)    2 L  (20-40)  %


 


Monocytes % (Manual)    2  (0-10)  %


 


Differential Comment     


 


Platelet Estimate    Normal  (NORMAL)  


 


RBC Morphology    Normal  


 


Sodium   136   (132-148)  mmol/L


 


Potassium   4.2   (3.6-5.2)  mmol/L


 


Chloride   108 H   ()  mmol/L


 


Carbon Dioxide   17 L   (22-30)  mmol/L


 


Anion Gap   15   (10-20)  


 


BUN   13   (7-17)  mg/dL


 


Creatinine   0.7   (0.7-1.2)  mg/dL


 


Est GFR (African Amer)   > 60   


 


Est GFR (Non-Af Amer)   > 60   


 


POC Glucose (mg/dL)  174 H    ()  mg/dL


 


Random Glucose   124 H   ()  mg/dL


 


Calcium   7.7 L   (8.6-10.4)  mg/dl


 


Phosphorus   2.2 L   (2.5-4.5)  mg/dL


 


Magnesium   1.2 L   (1.6-2.3)  mg/dL


 


Total Bilirubin   0.9   (0.2-1.3)  mg/dL


 


AST   33   (14-36)  U/L


 


ALT   41   (9-52)  U/L


 


Alkaline Phosphatase   68   ()  U/L


 


Total Protein   4.7 L   (6.3-8.3)  g/dL


 


Albumin   2.3 L   (3.5-5.0)  g/dL


 


Globulin   2.4   (2.2-3.9)  gm/dL


 


Albumin/Globulin Ratio   1.0   (1.0-2.1)  


 


Stool Occult Blood     (NEGATIVE)  


 


C. difficile Ag & Toxin     (NEGATIVE)  


 


Blood Type     


 


Antibody Screen     














  10/26/17 10/26/17 10/26/17 Range/Units





  Unknown 21:12 16:32 


 


WBC     (4.8-10.8)  K/uL


 


RBC     (3.80-5.20)  Mil/uL


 


Hgb     (11.0-16.0)  g/dL


 


Hct     (34.0-47.0)  %


 


MCV     (81.0-99.0)  fL


 


MCH     (27.0-31.0)  pg


 


MCHC     (33.0-37.0)  g/dL


 


RDW     (11.5-14.5)  %


 


Plt Count     (130-400)  K/uL


 


MPV     (7.2-11.7)  fL


 


Neut % (Auto)     (50.0-75.0)  %


 


Lymph % (Auto)     (20.0-40.0)  %


 


Mono % (Auto)     (0.0-10.0)  %


 


Eos % (Auto)     (0.0-4.0)  %


 


Baso % (Auto)     (0.0-2.0)  %


 


Neut #     (1.8-7.0)  K/uL


 


Lymph #     (1.0-4.3)  K/uL


 


Mono #     (0.0-0.8)  K/uL


 


Eos #     (0.0-0.7)  K/uL


 


Baso #     (0.0-0.2)  K/uL


 


Neutrophils % (Manual)     (50-75)  %


 


Band Neutrophils %     (0-2)  %


 


Lymphocytes % (Manual)     (20-40)  %


 


Monocytes % (Manual)     (0-10)  %


 


Differential Comment     


 


Platelet Estimate     (NORMAL)  


 


RBC Morphology     


 


Sodium     (132-148)  mmol/L


 


Potassium     (3.6-5.2)  mmol/L


 


Chloride     ()  mmol/L


 


Carbon Dioxide     (22-30)  mmol/L


 


Anion Gap     (10-20)  


 


BUN     (7-17)  mg/dL


 


Creatinine     (0.7-1.2)  mg/dL


 


Est GFR (African Amer)     


 


Est GFR (Non-Af Amer)     


 


POC Glucose (mg/dL)   214 H  230 H  ()  mg/dL


 


Random Glucose     ()  mg/dL


 


Calcium     (8.6-10.4)  mg/dl


 


Phosphorus     (2.5-4.5)  mg/dL


 


Magnesium     (1.6-2.3)  mg/dL


 


Total Bilirubin     (0.2-1.3)  mg/dL


 


AST     (14-36)  U/L


 


ALT     (9-52)  U/L


 


Alkaline Phosphatase     ()  U/L


 


Total Protein     (6.3-8.3)  g/dL


 


Albumin     (3.5-5.0)  g/dL


 


Globulin     (2.2-3.9)  gm/dL


 


Albumin/Globulin Ratio     (1.0-2.1)  


 


Stool Occult Blood     (NEGATIVE)  


 


C. difficile Ag & Toxin  Negative    (NEGATIVE)  


 


Blood Type     


 


Antibody Screen     














  10/26/17 10/26/17 10/26/17 Range/Units





  16:12 16:12 13:14 


 


WBC   28.3 H D   (4.8-10.8)  K/uL


 


RBC   4.03   (3.80-5.20)  Mil/uL


 


Hgb   11.8  D   (11.0-16.0)  g/dL


 


Hct   36.5   (34.0-47.0)  %


 


MCV   90.6   (81.0-99.0)  fL


 


MCH   29.3   (27.0-31.0)  pg


 


MCHC   32.4 L   (33.0-37.0)  g/dL


 


RDW   14.2   (11.5-14.5)  %


 


Plt Count   250   (130-400)  K/uL


 


MPV   7.7   (7.2-11.7)  fL


 


Neut % (Auto)     (50.0-75.0)  %


 


Lymph % (Auto)     (20.0-40.0)  %


 


Mono % (Auto)     (0.0-10.0)  %


 


Eos % (Auto)     (0.0-4.0)  %


 


Baso % (Auto)     (0.0-2.0)  %


 


Neut #     (1.8-7.0)  K/uL


 


Lymph #     (1.0-4.3)  K/uL


 


Mono #     (0.0-0.8)  K/uL


 


Eos #     (0.0-0.7)  K/uL


 


Baso #     (0.0-0.2)  K/uL


 


Neutrophils % (Manual)     (50-75)  %


 


Band Neutrophils %     (0-2)  %


 


Lymphocytes % (Manual)     (20-40)  %


 


Monocytes % (Manual)     (0-10)  %


 


Differential Comment      


 


Platelet Estimate     (NORMAL)  


 


RBC Morphology     


 


Sodium  136    (132-148)  mmol/L


 


Potassium  3.8    (3.6-5.2)  mmol/L


 


Chloride  106    ()  mmol/L


 


Carbon Dioxide  17 L    (22-30)  mmol/L


 


Anion Gap  17    (10-20)  


 


BUN  14    (7-17)  mg/dL


 


Creatinine  0.8    (0.7-1.2)  mg/dL


 


Est GFR (African Amer)  > 60    


 


Est GFR (Non-Af Amer)  > 60    


 


POC Glucose (mg/dL)     ()  mg/dL


 


Random Glucose  193 H    ()  mg/dL


 


Calcium  7.9 L    (8.6-10.4)  mg/dl


 


Phosphorus     (2.5-4.5)  mg/dL


 


Magnesium     (1.6-2.3)  mg/dL


 


Total Bilirubin     (0.2-1.3)  mg/dL


 


AST     (14-36)  U/L


 


ALT     (9-52)  U/L


 


Alkaline Phosphatase     ()  U/L


 


Total Protein     (6.3-8.3)  g/dL


 


Albumin     (3.5-5.0)  g/dL


 


Globulin     (2.2-3.9)  gm/dL


 


Albumin/Globulin Ratio     (1.0-2.1)  


 


Stool Occult Blood     (NEGATIVE)  


 


C. difficile Ag & Toxin     (NEGATIVE)  


 


Blood Type    B POSITIVE  


 


Antibody Screen    Negative  














  10/26/17 10/26/17 10/26/17 Range/Units





  13:14 13:14 11:25 


 


WBC   16.2 H   (4.8-10.8)  K/uL


 


RBC   2.46 L   (3.80-5.20)  Mil/uL


 


Hgb   7.3 L D   (11.0-16.0)  g/dL


 


Hct   22.7 L   (34.0-47.0)  %


 


MCV   92.0  D   (81.0-99.0)  fL


 


MCH   29.6   (27.0-31.0)  pg


 


MCHC   32.1 L   (33.0-37.0)  g/dL


 


RDW   14.1   (11.5-14.5)  %


 


Plt Count   221   (130-400)  K/uL


 


MPV   8.0   (7.2-11.7)  fL


 


Neut % (Auto)     (50.0-75.0)  %


 


Lymph % (Auto)     (20.0-40.0)  %


 


Mono % (Auto)     (0.0-10.0)  %


 


Eos % (Auto)     (0.0-4.0)  %


 


Baso % (Auto)     (0.0-2.0)  %


 


Neut #     (1.8-7.0)  K/uL


 


Lymph #     (1.0-4.3)  K/uL


 


Mono #     (0.0-0.8)  K/uL


 


Eos #     (0.0-0.7)  K/uL


 


Baso #     (0.0-0.2)  K/uL


 


Neutrophils % (Manual)     (50-75)  %


 


Band Neutrophils %     (0-2)  %


 


Lymphocytes % (Manual)     (20-40)  %


 


Monocytes % (Manual)     (0-10)  %


 


Differential Comment     


 


Platelet Estimate     (NORMAL)  


 


RBC Morphology     


 


Sodium  130 L    (132-148)  mmol/L


 


Potassium  3.8    (3.6-5.2)  mmol/L


 


Chloride  106    ()  mmol/L


 


Carbon Dioxide  11 L* D    (22-30)  mmol/L


 


Anion Gap  17    (10-20)  


 


BUN  9    (7-17)  mg/dL


 


Creatinine  0.5 L    (0.7-1.2)  mg/dL


 


Est GFR (African Amer)  > 60    


 


Est GFR (Non-Af Amer)  > 60    


 


POC Glucose (mg/dL)     ()  mg/dL


 


Random Glucose  108 H    ()  mg/dL


 


Calcium  6.8 L    (8.6-10.4)  mg/dl


 


Phosphorus     (2.5-4.5)  mg/dL


 


Magnesium     (1.6-2.3)  mg/dL


 


Total Bilirubin     (0.2-1.3)  mg/dL


 


AST     (14-36)  U/L


 


ALT     (9-52)  U/L


 


Alkaline Phosphatase     ()  U/L


 


Total Protein     (6.3-8.3)  g/dL


 


Albumin     (3.5-5.0)  g/dL


 


Globulin     (2.2-3.9)  gm/dL


 


Albumin/Globulin Ratio     (1.0-2.1)  


 


Stool Occult Blood    Negative  (NEGATIVE)  


 


C. difficile Ag & Toxin     (NEGATIVE)  


 


Blood Type     


 


Antibody Screen     








 Laboratory Results - last 24 hr











  10/26/17 10/26/17 10/26/17





  11:25 13:14 13:14


 


WBC   16.2 H 


 


RBC   2.46 L 


 


Hgb   7.3 L D 


 


Hct   22.7 L 


 


MCV   92.0  D 


 


MCH   29.6 


 


MCHC   32.1 L 


 


RDW   14.1 


 


Plt Count   221 


 


MPV   8.0 


 


Neut % (Auto)   


 


Lymph % (Auto)   


 


Mono % (Auto)   


 


Eos % (Auto)   


 


Baso % (Auto)   


 


Neut #   


 


Lymph #   


 


Mono #   


 


Eos #   


 


Baso #   


 


Neutrophils % (Manual)   


 


Band Neutrophils %   


 


Lymphocytes % (Manual)   


 


Monocytes % (Manual)   


 


Differential Comment   


 


Platelet Estimate   


 


RBC Morphology   


 


Sodium    130 L


 


Potassium    3.8


 


Chloride    106


 


Carbon Dioxide    11 L* D


 


Anion Gap    17


 


BUN    9


 


Creatinine    0.5 L


 


Est GFR ( Amer)    > 60


 


Est GFR (Non-Af Amer)    > 60


 


POC Glucose (mg/dL)   


 


Random Glucose    108 H


 


Calcium    6.8 L


 


Phosphorus   


 


Magnesium   


 


Total Bilirubin   


 


AST   


 


ALT   


 


Alkaline Phosphatase   


 


Total Protein   


 


Albumin   


 


Globulin   


 


Albumin/Globulin Ratio   


 


Stool Occult Blood  Negative  


 


C. difficile Ag & Toxin   


 


Blood Type   


 


Antibody Screen   














  10/26/17 10/26/17 10/26/17





  13:14 16:12 16:12


 


WBC   28.3 H D 


 


RBC   4.03 


 


Hgb   11.8  D 


 


Hct   36.5 


 


MCV   90.6 


 


MCH   29.3 


 


MCHC   32.4 L 


 


RDW   14.2 


 


Plt Count   250 


 


MPV   7.7 


 


Neut % (Auto)   


 


Lymph % (Auto)   


 


Mono % (Auto)   


 


Eos % (Auto)   


 


Baso % (Auto)   


 


Neut #   


 


Lymph #   


 


Mono #   


 


Eos #   


 


Baso #   


 


Neutrophils % (Manual)   


 


Band Neutrophils %   


 


Lymphocytes % (Manual)   


 


Monocytes % (Manual)   


 


Differential Comment    


 


Platelet Estimate   


 


RBC Morphology   


 


Sodium    136


 


Potassium    3.8


 


Chloride    106


 


Carbon Dioxide    17 L


 


Anion Gap    17


 


BUN    14


 


Creatinine    0.8


 


Est GFR ( Amer)    > 60


 


Est GFR (Non-Af Amer)    > 60


 


POC Glucose (mg/dL)   


 


Random Glucose    193 H


 


Calcium    7.9 L


 


Phosphorus   


 


Magnesium   


 


Total Bilirubin   


 


AST   


 


ALT   


 


Alkaline Phosphatase   


 


Total Protein   


 


Albumin   


 


Globulin   


 


Albumin/Globulin Ratio   


 


Stool Occult Blood   


 


C. difficile Ag & Toxin   


 


Blood Type  B POSITIVE  


 


Antibody Screen  Negative  














  10/26/17 10/26/17 10/26/17





  16:32 21:12 Unknown


 


WBC   


 


RBC   


 


Hgb   


 


Hct   


 


MCV   


 


MCH   


 


MCHC   


 


RDW   


 


Plt Count   


 


MPV   


 


Neut % (Auto)   


 


Lymph % (Auto)   


 


Mono % (Auto)   


 


Eos % (Auto)   


 


Baso % (Auto)   


 


Neut #   


 


Lymph #   


 


Mono #   


 


Eos #   


 


Baso #   


 


Neutrophils % (Manual)   


 


Band Neutrophils %   


 


Lymphocytes % (Manual)   


 


Monocytes % (Manual)   


 


Differential Comment   


 


Platelet Estimate   


 


RBC Morphology   


 


Sodium   


 


Potassium   


 


Chloride   


 


Carbon Dioxide   


 


Anion Gap   


 


BUN   


 


Creatinine   


 


Est GFR ( Amer)   


 


Est GFR (Non-Af Amer)   


 


POC Glucose (mg/dL)  230 H  214 H 


 


Random Glucose   


 


Calcium   


 


Phosphorus   


 


Magnesium   


 


Total Bilirubin   


 


AST   


 


ALT   


 


Alkaline Phosphatase   


 


Total Protein   


 


Albumin   


 


Globulin   


 


Albumin/Globulin Ratio   


 


Stool Occult Blood   


 


C. difficile Ag & Toxin    Negative


 


Blood Type   


 


Antibody Screen   














  10/27/17 10/27/17 10/27/17





  06:53 06:53 08:14


 


WBC  25.9 H  


 


RBC  3.93  


 


Hgb  11.5  


 


Hct  35.3  


 


MCV  89.9  


 


MCH  29.2  


 


MCHC  32.4 L  


 


RDW  14.0  


 


Plt Count  239  


 


MPV  8.4  


 


Neut % (Auto)  94.4 H  


 


Lymph % (Auto)  2.3 L  


 


Mono % (Auto)  3.3  


 


Eos % (Auto)  0.0  


 


Baso % (Auto)  0.0  


 


Neut #  24.5 H  


 


Lymph #  0.6 L  


 


Mono #  0.9 H  


 


Eos #  0.0  


 


Baso #  0.0  


 


Neutrophils % (Manual)  84 H  


 


Band Neutrophils %  12 H*  


 


Lymphocytes % (Manual)  2 L  


 


Monocytes % (Manual)  2  


 


Differential Comment   


 


Platelet Estimate  Normal  


 


RBC Morphology  Normal  


 


Sodium   136 


 


Potassium   4.2 


 


Chloride   108 H 


 


Carbon Dioxide   17 L 


 


Anion Gap   15 


 


BUN   13 


 


Creatinine   0.7 


 


Est GFR ( Amer)   > 60 


 


Est GFR (Non-Af Amer)   > 60 


 


POC Glucose (mg/dL)    174 H


 


Random Glucose   124 H 


 


Calcium   7.7 L 


 


Phosphorus   2.2 L 


 


Magnesium   1.2 L 


 


Total Bilirubin   0.9 


 


AST   33 


 


ALT   41 


 


Alkaline Phosphatase   68 


 


Total Protein   4.7 L 


 


Albumin   2.3 L 


 


Globulin   2.4 


 


Albumin/Globulin Ratio   1.0 


 


Stool Occult Blood   


 


C. difficile Ag & Toxin   


 


Blood Type   


 


Antibody Screen   











EKG/Cardiology Studies: 


Cardiology / EKG Studies





10/27/17 08:23


ELECTROCARDIOGRAM Routine 


   Comment: 


   Mode Of Transportation: 


   Reason For Exam: tachy











Fingerstick Blood Sugar Results: 174





Review of Systems





- Review of Systems


Systems not reviewed;Unavailable: Language Barrier





Critical Care Progress Note





- Nutrition


Nutrition: 


 Nutrition











 Category Date Time Status


 


 Liquid Diet [DIET] Diets  10/27/17 Breakfast Active














Assessment/Plan





- Assessment and Plan (Free Text)


Assessment: 


Patient is a 76 year old female with pmhx of HTN, DM, CVA POD#1 s/p KASSY with 

BSO 





Today's Plan: Patient stable for transfer to med/surg floor





Neuro: intact 


-Patient is awake, assessment limited due to language barrier 





Cardio: sinus tachycardia 


-hemodynamically stable 





Pulm:stable 


-Saturating at 98-99% on 2L NC 





GI: possible colitis secondary to fecal impaction 


-C. diff antigen negative 10/26/17 


-Tolerating liquid diet 





Nephro: stable, no acute issues 


-producing adequate urine 


-no elevation of BUN or Cr 





: s/p KASSY with BSO POD #1 


-uterine abscess culture 10/26 grew many gram positive cocci and rare gram 

negative rods


 


Endo: Hx of Diabetes 


- Novolin 





ID: 


-vanco, flagyl and zosyn per Dr. Walsh 


-WBC still elevated at 25.9 (10/27) but improved from 28.3 (10/26) 


-10/27/17 12 Bands, elevated from 3 bands 10/26/17 


-vanco trough 10/27/17 at 





Heme: mild normocytic anemia, likely secondary to blood loss from surgery 


-Hgb at 11.5 





PPX: post-operative prophylaxis managed by surgery 


-Heparin 5000u SC Q12 


-SCDs 


-Incentive spirometry 

















<Rivera Schwarz - Last Filed: 10/27/17 12:45>





CCU Subjective





- Physician Review


Events Since Last Encounter (Free Text): 


CCM


awake, responsive, nad


Neck- no jvdlungs- bilat bs


Heart-rr


aBd- bs+, soft, nontender


Ext- nontender





Labs-reviewed


A&P


s/p KASSY /BSO


s/p Disimpaction


Sepsis


HTN


DM


Hx CVA


HLD





cont meds and Ab


cont IV fluid


analgesia prn


Incentive spirometry


Maintain optimal lytes


DVT prophylaxis


stable for floor


d/w hosuestaff





 


 





CCU Objective





- Vital Signs / Intake & Output


Intake and Output (Last 8hrs): 


 Intake & Output











 10/26/17 10/27/17 10/27/17





 22:59 06:59 14:59


 


Intake Total 210 990 


 


Output Total 190 460 


 


Balance 20 530 


 


Intake:   


 


  Intake, IV Amount 210 990 


 


    Right Forearm 210 990 


 


Output:   


 


  Gastric Drainage 10  


 


  Drainage 10 60 


 


    Abdomen  60 


 


  Urine 170 400 


 


    Urethral (Ambrosio) 100 400 














- Medications


Active Medications: 


Active Medications











Generic Name Dose Route Start Last Admin





  Trade Name Freq  PRN Reason Stop Dose Admin


 


Acetaminophen  325 mg  10/24/17 18:21  





  Tylenol 325mg Tab  PO   





  Q4 PRN   





  Fever >100.4 F   


 


Albuterol Sulfate  2.5 mg  10/24/17 18:30  10/27/17 11:52





  Albuterol 0.083% Inhal Sol (2.5 Mg/3 Ml) Ud  IH   2.5 mg





  RQ4 MANDIE   Administration


 


Heparin Sodium (Porcine)  5,000 units  10/27/17 22:00  





  Heparin  SC   





  Q12 MANDIE   


 


Hydromorphone HCl  0.5 mg  10/26/17 14:57  10/26/17 15:24





  Dilaudid  IVP   0.5 mg





  Q4H PRN   Administration





  Pain, moderate (4-7)   


 


Piperacillin Sod/Tazobactam Sod  3.375 gm in 50 mls @ 200 mls/hr  10/24/17 08:

00  10/27/17 08:46





  Zosyn 3.375 Gm Iv Premix  IVPB   200 mls/hr





  Q8H MANDIE   Administration


 


Metronidazole  500 mg in 100 mls @ 100 mls/hr  10/24/17 07:00  10/27/17 07:03





  Flagyl  IVPB   100 mls/hr





  Q8H MANDIE   Administration


 


BUPIVACAINE 0.125%/0.9% NACL  600 mls @ 4 mls/hr  10/26/17 12:15  10/26/17 14:50





  Bupivacaine-Ns 0.125% On-Q   IJ  11/01/17 18:14  0 mls





  ONCE ONE   Administration


 


Lactated Ringer's  1,000 mls @ 105 mls/hr  10/26/17 15:00  10/27/17 00:18





  Lactated Ringer's  IV   105 mls/hr





  .[id number] MANDIE   Administration


 


Vancomycin/Sodium Chloride  1 gm in 200 mls @ 133 mls/hr  10/26/17 20:00  10/26/

17 21:00





  Vancomycin 1 Gm/Ns 200 Ml  IVPB  10/31/17 20:01  133 mls/hr





  Q24H MANDIE   Administration


 


Insulin Human Regular  0 unit  10/26/17 22:00  10/27/17 08:21





  Novolin R  SC   Not Given





  [facility]   





  Protocol   


 


Lactulose  20 gm  10/25/17 19:45  10/27/17 08:29





  Enulose  PO   20 gm





  Q6H MANDIE   Administration


 


Ondansetron HCl  4 mg  10/24/17 12:00  





  Zofran Inj  IVP   





  Q4 PRN   





  Nausea/Vomiting   


 


Potassium Phos/Sodium Phos  1 pkt  10/27/17 10:00  





  Neutra-Phos  PO   





  TID MANDIE   














- Patient Studies


Lab Studies: 


 Microbiology Studies











 10/24/17 03:00 S.aureus & Coag-Neg Staph PNA FISH - Final





 Blood Blood Culture - Final





    Coagulase Neg Staphylococcus





 Gram Stain - Final


 


 10/24/17 03:30 Blood Culture - Preliminary





 Blood    NO GROWTH AFTER 3 DAYS


 


 10/26/17 14:31 Gram Stain - Final





 Abscess - Uterus Wound Culture - Preliminary





    Gram Negative Jonny


 


 10/25/17 21:00 Blood Culture - Preliminary





 Blood-Venous    NO GROWTH AFTER 24 HOURS


 


 10/25/17 21:30 Blood Culture - Preliminary





 Blood-Venous    NO GROWTH AFTER 24 HOURS


 


 10/24/17 14:59 Gram Stain - Final





 Body Fluid - Vagina Body Fluid Culture - Final








 Lab Studies











  10/27/17 10/27/17 10/27/17 Range/Units





  11:40 08:14 06:53 


 


WBC     (4.8-10.8)  K/uL


 


RBC     (3.80-5.20)  Mil/uL


 


Hgb     (11.0-16.0)  g/dL


 


Hct     (34.0-47.0)  %


 


MCV     (81.0-99.0)  fL


 


MCH     (27.0-31.0)  pg


 


MCHC     (33.0-37.0)  g/dL


 


RDW     (11.5-14.5)  %


 


Plt Count     (130-400)  K/uL


 


MPV     (7.2-11.7)  fL


 


Neut % (Auto)     (50.0-75.0)  %


 


Lymph % (Auto)     (20.0-40.0)  %


 


Mono % (Auto)     (0.0-10.0)  %


 


Eos % (Auto)     (0.0-4.0)  %


 


Baso % (Auto)     (0.0-2.0)  %


 


Neut #     (1.8-7.0)  K/uL


 


Lymph #     (1.0-4.3)  K/uL


 


Mono #     (0.0-0.8)  K/uL


 


Eos #     (0.0-0.7)  K/uL


 


Baso #     (0.0-0.2)  K/uL


 


Neutrophils % (Manual)     (50-75)  %


 


Band Neutrophils %     (0-2)  %


 


Lymphocytes % (Manual)     (20-40)  %


 


Monocytes % (Manual)     (0-10)  %


 


Differential Comment     


 


Platelet Estimate     (NORMAL)  


 


RBC Morphology     


 


Sodium    136  (132-148)  mmol/L


 


Potassium    4.2  (3.6-5.2)  mmol/L


 


Chloride    108 H  ()  mmol/L


 


Carbon Dioxide    17 L  (22-30)  mmol/L


 


Anion Gap    15  (10-20)  


 


BUN    13  (7-17)  mg/dL


 


Creatinine    0.7  (0.7-1.2)  mg/dL


 


Est GFR (African Amer)    > 60  


 


Est GFR (Non-Af Amer)    > 60  


 


POC Glucose (mg/dL)  195 H  174 H   ()  mg/dL


 


Random Glucose    124 H  ()  mg/dL


 


Calcium    7.7 L  (8.6-10.4)  mg/dl


 


Phosphorus    2.2 L  (2.5-4.5)  mg/dL


 


Magnesium    1.2 L  (1.6-2.3)  mg/dL


 


Total Bilirubin    0.9  (0.2-1.3)  mg/dL


 


AST    33  (14-36)  U/L


 


ALT    41  (9-52)  U/L


 


Alkaline Phosphatase    68  ()  U/L


 


Total Protein    4.7 L  (6.3-8.3)  g/dL


 


Albumin    2.3 L  (3.5-5.0)  g/dL


 


Globulin    2.4  (2.2-3.9)  gm/dL


 


Albumin/Globulin Ratio    1.0  (1.0-2.1)  


 


C. difficile Ag & Toxin     (NEGATIVE)  


 


Blood Type     


 


Antibody Screen     














  10/27/17 10/26/17 10/26/17 Range/Units





  06:53 Unknown 21:12 


 


WBC  25.9 H    (4.8-10.8)  K/uL


 


RBC  3.93    (3.80-5.20)  Mil/uL


 


Hgb  11.5    (11.0-16.0)  g/dL


 


Hct  35.3    (34.0-47.0)  %


 


MCV  89.9    (81.0-99.0)  fL


 


MCH  29.2    (27.0-31.0)  pg


 


MCHC  32.4 L    (33.0-37.0)  g/dL


 


RDW  14.0    (11.5-14.5)  %


 


Plt Count  239    (130-400)  K/uL


 


MPV  8.4    (7.2-11.7)  fL


 


Neut % (Auto)  94.4 H    (50.0-75.0)  %


 


Lymph % (Auto)  2.3 L    (20.0-40.0)  %


 


Mono % (Auto)  3.3    (0.0-10.0)  %


 


Eos % (Auto)  0.0    (0.0-4.0)  %


 


Baso % (Auto)  0.0    (0.0-2.0)  %


 


Neut #  24.5 H    (1.8-7.0)  K/uL


 


Lymph #  0.6 L    (1.0-4.3)  K/uL


 


Mono #  0.9 H    (0.0-0.8)  K/uL


 


Eos #  0.0    (0.0-0.7)  K/uL


 


Baso #  0.0    (0.0-0.2)  K/uL


 


Neutrophils % (Manual)  84 H    (50-75)  %


 


Band Neutrophils %  12 H*    (0-2)  %


 


Lymphocytes % (Manual)  2 L    (20-40)  %


 


Monocytes % (Manual)  2    (0-10)  %


 


Differential Comment     


 


Platelet Estimate  Normal    (NORMAL)  


 


RBC Morphology  Normal    


 


Sodium     (132-148)  mmol/L


 


Potassium     (3.6-5.2)  mmol/L


 


Chloride     ()  mmol/L


 


Carbon Dioxide     (22-30)  mmol/L


 


Anion Gap     (10-20)  


 


BUN     (7-17)  mg/dL


 


Creatinine     (0.7-1.2)  mg/dL


 


Est GFR (African Amer)     


 


Est GFR (Non-Af Amer)     


 


POC Glucose (mg/dL)    214 H  ()  mg/dL


 


Random Glucose     ()  mg/dL


 


Calcium     (8.6-10.4)  mg/dl


 


Phosphorus     (2.5-4.5)  mg/dL


 


Magnesium     (1.6-2.3)  mg/dL


 


Total Bilirubin     (0.2-1.3)  mg/dL


 


AST     (14-36)  U/L


 


ALT     (9-52)  U/L


 


Alkaline Phosphatase     ()  U/L


 


Total Protein     (6.3-8.3)  g/dL


 


Albumin     (3.5-5.0)  g/dL


 


Globulin     (2.2-3.9)  gm/dL


 


Albumin/Globulin Ratio     (1.0-2.1)  


 


C. difficile Ag & Toxin   Negative   (NEGATIVE)  


 


Blood Type     


 


Antibody Screen     














  10/26/17 10/26/17 10/26/17 Range/Units





  16:32 16:12 16:12 


 


WBC    28.3 H D  (4.8-10.8)  K/uL


 


RBC    4.03  (3.80-5.20)  Mil/uL


 


Hgb    11.8  D  (11.0-16.0)  g/dL


 


Hct    36.5  (34.0-47.0)  %


 


MCV    90.6  (81.0-99.0)  fL


 


MCH    29.3  (27.0-31.0)  pg


 


MCHC    32.4 L  (33.0-37.0)  g/dL


 


RDW    14.2  (11.5-14.5)  %


 


Plt Count    250  (130-400)  K/uL


 


MPV    7.7  (7.2-11.7)  fL


 


Neut % (Auto)     (50.0-75.0)  %


 


Lymph % (Auto)     (20.0-40.0)  %


 


Mono % (Auto)     (0.0-10.0)  %


 


Eos % (Auto)     (0.0-4.0)  %


 


Baso % (Auto)     (0.0-2.0)  %


 


Neut #     (1.8-7.0)  K/uL


 


Lymph #     (1.0-4.3)  K/uL


 


Mono #     (0.0-0.8)  K/uL


 


Eos #     (0.0-0.7)  K/uL


 


Baso #     (0.0-0.2)  K/uL


 


Neutrophils % (Manual)     (50-75)  %


 


Band Neutrophils %     (0-2)  %


 


Lymphocytes % (Manual)     (20-40)  %


 


Monocytes % (Manual)     (0-10)  %


 


Differential Comment      


 


Platelet Estimate     (NORMAL)  


 


RBC Morphology     


 


Sodium   136   (132-148)  mmol/L


 


Potassium   3.8   (3.6-5.2)  mmol/L


 


Chloride   106   ()  mmol/L


 


Carbon Dioxide   17 L   (22-30)  mmol/L


 


Anion Gap   17   (10-20)  


 


BUN   14   (7-17)  mg/dL


 


Creatinine   0.8   (0.7-1.2)  mg/dL


 


Est GFR (African Amer)   > 60   


 


Est GFR (Non-Af Amer)   > 60   


 


POC Glucose (mg/dL)  230 H    ()  mg/dL


 


Random Glucose   193 H   ()  mg/dL


 


Calcium   7.9 L   (8.6-10.4)  mg/dl


 


Phosphorus     (2.5-4.5)  mg/dL


 


Magnesium     (1.6-2.3)  mg/dL


 


Total Bilirubin     (0.2-1.3)  mg/dL


 


AST     (14-36)  U/L


 


ALT     (9-52)  U/L


 


Alkaline Phosphatase     ()  U/L


 


Total Protein     (6.3-8.3)  g/dL


 


Albumin     (3.5-5.0)  g/dL


 


Globulin     (2.2-3.9)  gm/dL


 


Albumin/Globulin Ratio     (1.0-2.1)  


 


C. difficile Ag & Toxin     (NEGATIVE)  


 


Blood Type     


 


Antibody Screen     














  10/26/17 10/26/17 10/26/17 Range/Units





  13:14 13:14 13:14 


 


WBC    16.2 H  (4.8-10.8)  K/uL


 


RBC    2.46 L  (3.80-5.20)  Mil/uL


 


Hgb    7.3 L D  (11.0-16.0)  g/dL


 


Hct    22.7 L  (34.0-47.0)  %


 


MCV    92.0  D  (81.0-99.0)  fL


 


MCH    29.6  (27.0-31.0)  pg


 


MCHC    32.1 L  (33.0-37.0)  g/dL


 


RDW    14.1  (11.5-14.5)  %


 


Plt Count    221  (130-400)  K/uL


 


MPV    8.0  (7.2-11.7)  fL


 


Neut % (Auto)     (50.0-75.0)  %


 


Lymph % (Auto)     (20.0-40.0)  %


 


Mono % (Auto)     (0.0-10.0)  %


 


Eos % (Auto)     (0.0-4.0)  %


 


Baso % (Auto)     (0.0-2.0)  %


 


Neut #     (1.8-7.0)  K/uL


 


Lymph #     (1.0-4.3)  K/uL


 


Mono #     (0.0-0.8)  K/uL


 


Eos #     (0.0-0.7)  K/uL


 


Baso #     (0.0-0.2)  K/uL


 


Neutrophils % (Manual)     (50-75)  %


 


Band Neutrophils %     (0-2)  %


 


Lymphocytes % (Manual)     (20-40)  %


 


Monocytes % (Manual)     (0-10)  %


 


Differential Comment     


 


Platelet Estimate     (NORMAL)  


 


RBC Morphology     


 


Sodium   130 L   (132-148)  mmol/L


 


Potassium   3.8   (3.6-5.2)  mmol/L


 


Chloride   106   ()  mmol/L


 


Carbon Dioxide   11 L* D   (22-30)  mmol/L


 


Anion Gap   17   (10-20)  


 


BUN   9   (7-17)  mg/dL


 


Creatinine   0.5 L   (0.7-1.2)  mg/dL


 


Est GFR (African Amer)   > 60   


 


Est GFR (Non-Af Amer)   > 60   


 


POC Glucose (mg/dL)     ()  mg/dL


 


Random Glucose   108 H   ()  mg/dL


 


Calcium   6.8 L   (8.6-10.4)  mg/dl


 


Phosphorus     (2.5-4.5)  mg/dL


 


Magnesium     (1.6-2.3)  mg/dL


 


Total Bilirubin     (0.2-1.3)  mg/dL


 


AST     (14-36)  U/L


 


ALT     (9-52)  U/L


 


Alkaline Phosphatase     ()  U/L


 


Total Protein     (6.3-8.3)  g/dL


 


Albumin     (3.5-5.0)  g/dL


 


Globulin     (2.2-3.9)  gm/dL


 


Albumin/Globulin Ratio     (1.0-2.1)  


 


C. difficile Ag & Toxin     (NEGATIVE)  


 


Blood Type  B POSITIVE    


 


Antibody Screen  Negative    








 Laboratory Results - last 24 hr











  10/26/17 10/26/17 10/26/17





  13:14 13:14 13:14


 


WBC  16.2 H  


 


RBC  2.46 L  


 


Hgb  7.3 L D  


 


Hct  22.7 L  


 


MCV  92.0  D  


 


MCH  29.6  


 


MCHC  32.1 L  


 


RDW  14.1  


 


Plt Count  221  


 


MPV  8.0  


 


Neut % (Auto)   


 


Lymph % (Auto)   


 


Mono % (Auto)   


 


Eos % (Auto)   


 


Baso % (Auto)   


 


Neut #   


 


Lymph #   


 


Mono #   


 


Eos #   


 


Baso #   


 


Neutrophils % (Manual)   


 


Band Neutrophils %   


 


Lymphocytes % (Manual)   


 


Monocytes % (Manual)   


 


Differential Comment   


 


Platelet Estimate   


 


RBC Morphology   


 


Sodium   130 L 


 


Potassium   3.8 


 


Chloride   106 


 


Carbon Dioxide   11 L* D 


 


Anion Gap   17 


 


BUN   9 


 


Creatinine   0.5 L 


 


Est GFR ( Amer)   > 60 


 


Est GFR (Non-Af Amer)   > 60 


 


POC Glucose (mg/dL)   


 


Random Glucose   108 H 


 


Calcium   6.8 L 


 


Phosphorus   


 


Magnesium   


 


Total Bilirubin   


 


AST   


 


ALT   


 


Alkaline Phosphatase   


 


Total Protein   


 


Albumin   


 


Globulin   


 


Albumin/Globulin Ratio   


 


C. difficile Ag & Toxin   


 


Blood Type    B POSITIVE


 


Antibody Screen    Negative














  10/26/17 10/26/17 10/26/17





  16:12 16:12 16:32


 


WBC  28.3 H D  


 


RBC  4.03  


 


Hgb  11.8  D  


 


Hct  36.5  


 


MCV  90.6  


 


MCH  29.3  


 


MCHC  32.4 L  


 


RDW  14.2  


 


Plt Count  250  


 


MPV  7.7  


 


Neut % (Auto)   


 


Lymph % (Auto)   


 


Mono % (Auto)   


 


Eos % (Auto)   


 


Baso % (Auto)   


 


Neut #   


 


Lymph #   


 


Mono #   


 


Eos #   


 


Baso #   


 


Neutrophils % (Manual)   


 


Band Neutrophils %   


 


Lymphocytes % (Manual)   


 


Monocytes % (Manual)   


 


Differential Comment    


 


Platelet Estimate   


 


RBC Morphology   


 


Sodium   136 


 


Potassium   3.8 


 


Chloride   106 


 


Carbon Dioxide   17 L 


 


Anion Gap   17 


 


BUN   14 


 


Creatinine   0.8 


 


Est GFR ( Amer)   > 60 


 


Est GFR (Non-Af Amer)   > 60 


 


POC Glucose (mg/dL)    230 H


 


Random Glucose   193 H 


 


Calcium   7.9 L 


 


Phosphorus   


 


Magnesium   


 


Total Bilirubin   


 


AST   


 


ALT   


 


Alkaline Phosphatase   


 


Total Protein   


 


Albumin   


 


Globulin   


 


Albumin/Globulin Ratio   


 


C. difficile Ag & Toxin   


 


Blood Type   


 


Antibody Screen   














  10/26/17 10/26/17 10/27/17





  21:12 Unknown 06:53


 


WBC    25.9 H


 


RBC    3.93


 


Hgb    11.5


 


Hct    35.3


 


MCV    89.9


 


MCH    29.2


 


MCHC    32.4 L


 


RDW    14.0


 


Plt Count    239


 


MPV    8.4


 


Neut % (Auto)    94.4 H


 


Lymph % (Auto)    2.3 L


 


Mono % (Auto)    3.3


 


Eos % (Auto)    0.0


 


Baso % (Auto)    0.0


 


Neut #    24.5 H


 


Lymph #    0.6 L


 


Mono #    0.9 H


 


Eos #    0.0


 


Baso #    0.0


 


Neutrophils % (Manual)    84 H


 


Band Neutrophils %    12 H*


 


Lymphocytes % (Manual)    2 L


 


Monocytes % (Manual)    2


 


Differential Comment   


 


Platelet Estimate    Normal


 


RBC Morphology    Normal


 


Sodium   


 


Potassium   


 


Chloride   


 


Carbon Dioxide   


 


Anion Gap   


 


BUN   


 


Creatinine   


 


Est GFR ( Amer)   


 


Est GFR (Non-Af Amer)   


 


POC Glucose (mg/dL)  214 H  


 


Random Glucose   


 


Calcium   


 


Phosphorus   


 


Magnesium   


 


Total Bilirubin   


 


AST   


 


ALT   


 


Alkaline Phosphatase   


 


Total Protein   


 


Albumin   


 


Globulin   


 


Albumin/Globulin Ratio   


 


C. difficile Ag & Toxin   Negative 


 


Blood Type   


 


Antibody Screen   














  10/27/17 10/27/17 10/27/17





  06:53 08:14 11:40


 


WBC   


 


RBC   


 


Hgb   


 


Hct   


 


MCV   


 


MCH   


 


MCHC   


 


RDW   


 


Plt Count   


 


MPV   


 


Neut % (Auto)   


 


Lymph % (Auto)   


 


Mono % (Auto)   


 


Eos % (Auto)   


 


Baso % (Auto)   


 


Neut #   


 


Lymph #   


 


Mono #   


 


Eos #   


 


Baso #   


 


Neutrophils % (Manual)   


 


Band Neutrophils %   


 


Lymphocytes % (Manual)   


 


Monocytes % (Manual)   


 


Differential Comment   


 


Platelet Estimate   


 


RBC Morphology   


 


Sodium  136  


 


Potassium  4.2  


 


Chloride  108 H  


 


Carbon Dioxide  17 L  


 


Anion Gap  15  


 


BUN  13  


 


Creatinine  0.7  


 


Est GFR ( Amer)  > 60  


 


Est GFR (Non-Af Amer)  > 60  


 


POC Glucose (mg/dL)   174 H  195 H


 


Random Glucose  124 H  


 


Calcium  7.7 L  


 


Phosphorus  2.2 L  


 


Magnesium  1.2 L  


 


Total Bilirubin  0.9  


 


AST  33  


 


ALT  41  


 


Alkaline Phosphatase  68  


 


Total Protein  4.7 L  


 


Albumin  2.3 L  


 


Globulin  2.4  


 


Albumin/Globulin Ratio  1.0  


 


C. difficile Ag & Toxin   


 


Blood Type   


 


Antibody Screen   











EKG/Cardiology Studies: 


Cardiology / EKG Studies





10/27/17 08:23


ELECTROCARDIOGRAM Routine 


   Comment: 


   Mode Of Transportation: 


   Reason For Exam: tachy














Critical Care Progress Note





- Nutrition


Nutrition: 


 Nutrition











 Category Date Time Status


 


 Liquid Diet [DIET] Diets  10/27/17 Breakfast Active

## 2017-10-27 NOTE — CP.PCM.PN
Subjective





- Date & Time of Evaluation


Date of Evaluation: 10/27/17


Time of Evaluation: 08:00





- Subjective


Subjective: 





76F w. uterine infection  / L adenexal mass, s/p KASSY w. BSO, POD#1





Objective





- Vital Signs/Intake and Output


Vital Signs (last 24 hours): 


 











Temp Pulse Resp BP Pulse Ox


 


 98.6 F   116 H  19   125/72   98 


 


 10/27/17 04:00  10/27/17 18:31  10/27/17 18:31  10/27/17 18:31  10/27/17 18:31








Intake and Output: 


 











 10/27/17 10/27/17





 06:59 18:59


 


Intake Total 1200 775


 


Output Total 650 420


 


Balance 550 355














- Medications


Medications: 


 Current Medications





Acetaminophen (Tylenol 325mg Tab)  325 mg PO Q4 PRN


   PRN Reason: Fever >100.4 F


Albuterol Sulfate (Albuterol 0.083% Inhal Sol (2.5 Mg/3 Ml) Ud)  2.5 mg IH RQ4 

MANDIE


   Last Admin: 10/27/17 16:41 Dose:  2.5 mg


Heparin Sodium (Porcine) (Heparin)  5,000 units SC Q12 [facility]


Hydromorphone HCl (Dilaudid)  0.5 mg IVP Q4H PRN


   PRN Reason: Pain, moderate (4-7)


   Last Admin: 10/27/17 13:55 Dose:  0.5 mg


Piperacillin Sod/Tazobactam Sod (Zosyn 3.375 Gm Iv Premix)  3.375 gm in 50 mls 

@ 200 mls/hr IVPB Q8H [facility]


   Last Admin: 10/27/17 17:21 Dose:  200 mls/hr


Metronidazole (Flagyl)  500 mg in 100 mls @ 100 mls/hr IVPB Q8H [facility]


   Last Admin: 10/27/17 15:57 Dose:  100 mls/hr


BUPIVACAINE 0.125%/0.9% NACL (Bupivacaine-Ns 0.125% On-Q )  600 mls @ 4 mls/

hr IJ ONCE ONE


   Stop: 11/01/17 18:14


   Last Admin: 10/26/17 14:50 Dose:  0 mls


Lactated Ringer's (Lactated Ringer's)  1,000 mls @ 105 mls/hr IV .Q9H32M [facility]


   Last Admin: 10/27/17 17:24 Dose:  105 mls/hr


Vancomycin/Sodium Chloride (Vancomycin 1 Gm/Ns 200 Ml)  1 gm in 200 mls @ 133 

mls/hr IVPB Q24H [facility]


   Stop: 10/31/17 20:01


   Last Admin: 10/26/17 21:00 Dose:  133 mls/hr


Insulin Human Regular (Novolin R)  0 unit SC ACHS MANDIE


   PRN Reason: Protocol


   Last Admin: 10/27/17 17:23 Dose:  4 unit


Lactulose (Enulose)  20 gm PO Q6H [facility]


   Last Admin: 10/27/17 15:17 Dose:  20 gm


Ondansetron HCl (Zofran Inj)  4 mg IVP Q4 PRN


   PRN Reason: Nausea/Vomiting


Potassium Phos/Sodium Phos (Neutra-Phos)  1 pkt PO TID [facility]


   Last Admin: 10/27/17 17:25 Dose:  1 pkt











- Labs


Labs: 


 





 10/27/17 06:53 





 10/27/17 06:53 





 











PT  15.5 SECONDS (9.7-12.2)  H  10/24/17  03:58    


 


INR  1.4   10/24/17  03:58    


 


APTT  28 SECONDS (21-34)   10/24/17  03:58    














- Constitutional


Appears: Confused, Cachectic, Chronically Ill





- Head Exam


Head Exam: NORMOCEPHALIC





- Eye Exam


Eye Exam: PERRL





- ENT Exam


ENT Exam: Mucous Membranes Dry





- Neck Exam


Neck Exam: absent: Lymphadenopathy





- Respiratory Exam


Respiratory Exam: Decreased Breath Sounds





- Cardiovascular Exam


Cardiovascular Exam: REGULAR RHYTHM





- GI/Abdominal Exam


GI & Abdominal Exam: Distended





- Rectal Exam


Rectal Exam: Deferred





Assessment and Plan


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


Status: Acute   





(2) Diabetes mellitus


Status: Chronic   





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


Status: Chronic   





- Assessment and Plan (Free Text)


Assessment: 





76F w. uterine infection  / L adenexal mass, s/p KASSY w. BSO, POD#1





cont iv rx and wound care

## 2017-10-28 LAB
ALBUMIN/GLOB SERPL: 0.7 {RATIO} (ref 1–2.1)
ALP SERPL-CCNC: 57 U/L (ref 38–126)
ALT SERPL-CCNC: 50 U/L (ref 9–52)
AST SERPL-CCNC: 22 U/L (ref 14–36)
BASOPHILS # BLD AUTO: 0 K/UL (ref 0–0.2)
BASOPHILS NFR BLD: 0 % (ref 0–2)
BILIRUB SERPL-MCNC: 0.5 MG/DL (ref 0.2–1.3)
BUN SERPL-MCNC: 12 MG/DL (ref 7–17)
CALCIUM SERPL-MCNC: 8.3 MG/DL (ref 8.6–10.4)
CHLORIDE SERPL-SCNC: 108 MMOL/L (ref 98–107)
CO2 SERPL-SCNC: 22 MMOL/L (ref 22–30)
EOSINOPHIL # BLD AUTO: 0 K/UL (ref 0–0.7)
EOSINOPHIL NFR BLD: 0 % (ref 0–4)
ERYTHROCYTE [DISTWIDTH] IN BLOOD BY AUTOMATED COUNT: 14 % (ref 11.5–14.5)
GLOBULIN SER-MCNC: 3.4 GM/DL (ref 2.2–3.9)
GLUCOSE SERPL-MCNC: 146 MG/DL (ref 65–105)
HCT VFR BLD CALC: 32.7 % (ref 34–47)
LYMPHOCYTES # BLD AUTO: 0.5 K/UL (ref 1–4.3)
LYMPHOCYTES NFR BLD AUTO: 2.4 % (ref 20–40)
MAGNESIUM SERPL-MCNC: 1.7 MG/DL (ref 1.6–2.3)
MCH RBC QN AUTO: 29.3 PG (ref 27–31)
MCHC RBC AUTO-ENTMCNC: 32.8 G/DL (ref 33–37)
MCV RBC AUTO: 89.3 FL (ref 81–99)
MONOCYTES # BLD: 0.8 K/UL (ref 0–0.8)
MONOCYTES NFR BLD: 3.9 % (ref 0–10)
NEUTROPHILS NFR BLD AUTO: 86 % (ref 50–75)
NRBC BLD AUTO-RTO: 0 % (ref 0–2)
PHOSPHATE SERPL-MCNC: 3.8 MG/DL (ref 2.5–4.5)
PLATELET # BLD: 229 K/UL (ref 130–400)
PMV BLD AUTO: 8.1 FL (ref 7.2–11.7)
POTASSIUM SERPL-SCNC: 2.8 MMOL/L (ref 3.6–5.2)
PROT SERPL-MCNC: 5.8 G/DL (ref 6.3–8.3)
SODIUM SERPL-SCNC: 143 MMOL/L (ref 132–148)
TOTAL CELLS COUNTED BLD: 100
WBC # BLD AUTO: 21.4 K/UL (ref 4.8–10.8)

## 2017-10-28 RX ADMIN — POTASSIUM & SODIUM PHOSPHATES POWDER PACK 280-160-250 MG SCH PKT: 280-160-250 PACK at 11:02

## 2017-10-28 RX ADMIN — WATER SCH MLS/HR: 1 INJECTION INTRAMUSCULAR; INTRAVENOUS; SUBCUTANEOUS at 06:17

## 2017-10-28 RX ADMIN — ALBUTEROL SULFATE SCH MG: 2.5 SOLUTION RESPIRATORY (INHALATION) at 00:58

## 2017-10-28 RX ADMIN — TAZOBACTAM SODIUM AND PIPERACILLIN SODIUM SCH MLS/HR: 375; 3 INJECTION, SOLUTION INTRAVENOUS at 17:00

## 2017-10-28 RX ADMIN — ALBUTEROL SULFATE SCH: 2.5 SOLUTION RESPIRATORY (INHALATION) at 03:40

## 2017-10-28 RX ADMIN — TAZOBACTAM SODIUM AND PIPERACILLIN SODIUM SCH MLS/HR: 375; 3 INJECTION, SOLUTION INTRAVENOUS at 11:03

## 2017-10-28 RX ADMIN — ALBUTEROL SULFATE SCH MG: 2.5 SOLUTION RESPIRATORY (INHALATION) at 08:00

## 2017-10-28 RX ADMIN — VANCOMYCIN HYDROCHLORIDE SCH MLS/HR: 1 INJECTION, POWDER, LYOPHILIZED, FOR SOLUTION INTRAVENOUS at 22:36

## 2017-10-28 RX ADMIN — ALBUTEROL SULFATE SCH: 2.5 SOLUTION RESPIRATORY (INHALATION) at 12:00

## 2017-10-28 RX ADMIN — POTASSIUM & SODIUM PHOSPHATES POWDER PACK 280-160-250 MG SCH PKT: 280-160-250 PACK at 18:00

## 2017-10-28 RX ADMIN — WATER SCH MLS/HR: 1 INJECTION INTRAMUSCULAR; INTRAVENOUS; SUBCUTANEOUS at 14:05

## 2017-10-28 RX ADMIN — ALBUTEROL SULFATE SCH MG: 2.5 SOLUTION RESPIRATORY (INHALATION) at 20:31

## 2017-10-28 RX ADMIN — HUMAN INSULIN SCH: 100 INJECTION, SOLUTION SUBCUTANEOUS at 16:30

## 2017-10-28 RX ADMIN — HUMAN INSULIN SCH: 100 INJECTION, SOLUTION SUBCUTANEOUS at 12:08

## 2017-10-28 RX ADMIN — POTASSIUM & SODIUM PHOSPHATES POWDER PACK 280-160-250 MG SCH PKT: 280-160-250 PACK at 13:21

## 2017-10-28 RX ADMIN — HUMAN INSULIN SCH: 100 INJECTION, SOLUTION SUBCUTANEOUS at 22:48

## 2017-10-28 RX ADMIN — TAZOBACTAM SODIUM AND PIPERACILLIN SODIUM SCH MLS/HR: 375; 3 INJECTION, SOLUTION INTRAVENOUS at 00:22

## 2017-10-28 RX ADMIN — ALBUTEROL SULFATE SCH MG: 2.5 SOLUTION RESPIRATORY (INHALATION) at 15:49

## 2017-10-28 NOTE — RAD
Chest and abdomen two views 



History: Postsurgical change. 



Comparison: None available. 



Findings: 



Moderate venous congestion. 



Left hilar prominence. 



Biapical pleural thickening with upper lobe granulomatous changes. 



Tortuous ectatic aorta. 



Cardiomegaly. 



Degenerative changes in the spine and shoulders. 



Surgical clips project over the abdomen. 



Multiple dilated loops of small bowel seen throughout the mid and 

lower abdomen suggestive for an ileus and/or partial bowel 

obstruction. 



Suggestion of overlying drain and/or external tubing in the lower 

pelvis.  Repeat study would be helpful. 



Impression: 



Moderate venous congestion. 



Left hilar prominence. 



Biapical pleural thickening with upper lobe granulomatous changes. 



Tortuous ectatic aorta. 



Cardiomegaly. 



Degenerative changes in the spine and shoulders. 



Surgical clips project over the abdomen. 



Multiple dilated loops of small bowel seen throughout the mid and 

lower abdomen suggestive for an ileus and/or partial bowel 

obstruction. 



Suggestion of overlying drain and/or external tubing in the lower 

pelvis.  Repeat study would be helpful.

## 2017-10-28 NOTE — CP.PCM.PN
Subjective





- Date & Time of Evaluation


Date of Evaluation: 10/28/17


Time of Evaluation: 10:37





- Subjective


Subjective: 





Surgery: Dr. Henry 





Patient resting comfortably this am. She complains of pain in the lower 

abdomen. She denies vomiting, reports nausea. Tolerating diet. Per nursing no 

acute events overnight. 





Objective





- Vital Signs/Intake and Output


Vital Signs (last 24 hours): 


 











Temp Pulse Resp BP Pulse Ox


 


 98.8 F   100 H  14   143/72   99 


 


 10/28/17 08:00  10/28/17 04:00  10/28/17 04:00  10/28/17 00:00  10/28/17 04:00








Intake and Output: 


 











 10/28/17 10/28/17





 06:59 18:59


 


Intake Total 1750 


 


Output Total 1510 


 


Balance 240 














- Medications


Medications: 


 Current Medications





Acetaminophen (Tylenol 325mg Tab)  325 mg PO Q4 PRN


   PRN Reason: Fever >100.4 F


Albuterol Sulfate (Albuterol 0.083% Inhal Sol (2.5 Mg/3 Ml) Ud)  2.5 mg IH RQ4 

MANDIE


   Last Admin: 10/28/17 08:00 Dose:  2.5 mg


Heparin Sodium (Porcine) (Heparin)  5,000 units SC Q12 [facility]IE


   Last Admin: 10/27/17 21:37 Dose:  5,000 units


Hydromorphone HCl (Dilaudid)  0.5 mg IVP Q4H PRN


   PRN Reason: Pain, moderate (4-7)


   Last Admin: 10/27/17 13:55 Dose:  0.5 mg


Piperacillin Sod/Tazobactam Sod (Zosyn 3.375 Gm Iv Premix)  3.375 gm in 50 mls 

@ 200 mls/hr IVPB Q8H [facility]


   Last Admin: 10/28/17 00:22 Dose:  200 mls/hr


Metronidazole (Flagyl)  500 mg in 100 mls @ 100 mls/hr IVPB Q8H [facility]


   Last Admin: 10/28/17 06:17 Dose:  100 mls/hr


BUPIVACAINE 0.125%/0.9% NACL (Bupivacaine-Ns 0.125% On-Q )  600 mls @ 4 mls/

hr IJ ONCE ONE


   Stop: 11/01/17 18:14


   Last Admin: 10/26/17 14:50 Dose:  0 mls


Vancomycin/Sodium Chloride (Vancomycin 1 Gm/Ns 200 Ml)  1 gm in 200 mls @ 133 

mls/hr IVPB Q24H [facility]


   Stop: 10/31/17 20:01


   Last Admin: 10/27/17 20:44 Dose:  133 mls/hr


Lactated Ringer's (Lactated Ringer's)  1,000 mls @ 50 mls/hr IV .Q20H [facility]


   Last Admin: 10/27/17 20:59 Dose:  50 mls/hr


Potassium Chloride (Potassium Chloride 10 Meq/100 Ml)  10 meq in 100 mls @ 100 

mls/hr IVPB Q1H [facility]


   Stop: 10/28/17 11:59


   Last Admin: 10/28/17 08:41 Dose:  100 mls/hr


Insulin Human Regular (Novolin R)  0 unit SC ACHS MANDIE


   PRN Reason: Protocol


   Last Admin: 10/27/17 21:38 Dose:  Not Given


Lactulose (Enulose)  20 gm PO Q6H [facility]


   Last Admin: 10/28/17 01:29 Dose:  20 gm


Ondansetron HCl (Zofran Inj)  4 mg IVP Q4 PRN


   PRN Reason: Nausea/Vomiting


Pantoprazole Sodium (Protonix Inj)  40 mg IVP DAILY [facility]


Potassium Phos/Sodium Phos (Neutra-Phos)  1 pkt PO TID [facility]


   Last Admin: 10/27/17 17:25 Dose:  1 pkt











- Labs


Labs: 


 





 10/28/17 06:27 





 10/28/17 06:27 





 











PT  15.5 SECONDS (9.7-12.2)  H  10/24/17  03:58    


 


INR  1.4   10/24/17  03:58    


 


APTT  28 SECONDS (21-34)   10/24/17  03:58    














- Constitutional


Appears: Cachectic, Chronically Ill





- Head Exam


Head Exam: ATRAUMATIC, NORMOCEPHALIC





- Eye Exam


Eye Exam: EOMI, Normal appearance





- ENT Exam


ENT Exam: Mucous Membranes Dry





- Cardiovascular Exam


Cardiovascular Exam: REGULAR RHYTHM.  absent: Tachycardia





- GI/Abdominal Exam


GI & Abdominal Exam: Soft, Tenderness (amanda-incisional and lower abdomen which 

is appropriate post op ).  absent: Guarding, Rigid, Rebound





- Neurological Exam


Neurological Exam: Alert, Awake





- Skin


Skin Exam: Dry, Warm





Assessment and Plan





- Assessment and Plan (Free Text)


Assessment: 





76F w. uterine infection, L adenexal mass, s/p KASSY w. BSO, POD2


Plan: 





-keep spring in place 


-cont abx


-ADAT 


-pain control 


-OOB as tolerated 


-IS use 


-daily labs 


-DVT prophy/PPIs


-further care per primary team 


-d/w Dr. Radha Higginbotham PGY3

## 2017-10-28 NOTE — RAD
Abdomen three views 



History: Prior surgery. 



Comparison: None available. 



Findings: 



Multiple dilated loops of small bowel seen within the mid abdomen 

suggestive for ileus and/or partial small bowel obstruction. 



Overlying surgical staples. 



Surgical drain projecting over the midline pelvis. 



Degenerative changes in the spine and hips. 



Upper abdomen is partially excluded from this study. 



Vascular calcifications. 



Impression: 



Limited study. 



Multiple dilated loops of small bowel seen within the mid abdomen 

suggestive for ileus and/or partial small bowel obstruction. 



Overlying surgical staples. 



Surgical drain projecting over the midline pelvis. 



Degenerative changes in the spine and hips. 



Upper abdomen is partially excluded from this study. 



Vascular calcifications.

## 2017-10-28 NOTE — CP.[email or web address].PN
Subjective





- Date & Time of Evaluation


Date of Evaluation: 10/28/17


Time of Evaluation: 23:48





- Subjective


Subjective: 





Reveiwed pt's bldwork early this AM and K at 2.9. Gave orders for repletion. Mg 

and phos at normal levels in AM labs.  





Intra-operative events noted from Gyn notes. Appreciate difficulty of procedure 

and detail of op note. Thank you. 





Pt's WBC coming down, and has managed to avoid going into shock. Pt transferred 

to  for further recuperation. 





Objective





- Vital Signs/Intake and Output


Vital Signs (last 24 hours): 


 











Temp Pulse Resp BP Pulse Ox


 


 97.8 F   100 H  20   153/79 H  98 


 


 10/28/17 15:00  10/28/17 15:00  10/28/17 15:00  10/28/17 15:00  10/28/17 15:00








Intake and Output: 


 











 10/28/17 10/29/17





 18:59 06:59


 


Intake Total 150 


 


Output Total 570 


 


Balance -420 














- Medications


Medications: 


 Current Medications





Acetaminophen (Tylenol 325mg Tab)  325 mg PO Q4 PRN


   PRN Reason: Fever >100.4 F


Albuterol Sulfate (Albuterol 0.083% Inhal Sol (2.5 Mg/3 Ml) Ud)  2.5 mg IH RQ4 

MANDIE


   Last Admin: 10/28/17 20:31 Dose:  2.5 mg


Heparin Sodium (Porcine) (Heparin)  5,000 units SC Q12 [facility]IE


   Last Admin: 10/28/17 22:47 Dose:  5,000 units


Hydromorphone HCl (Dilaudid)  0.5 mg IVP Q4H PRN


   PRN Reason: Pain, moderate (4-7)


   Last Admin: 10/27/17 13:55 Dose:  0.5 mg


Piperacillin Sod/Tazobactam Sod (Zosyn 3.375 Gm Iv Premix)  3.375 gm in 50 mls 

@ 200 mls/hr IVPB Q8H [facility]


   Last Admin: 10/28/17 17:00 Dose:  200 mls/hr


Metronidazole (Flagyl)  500 mg in 100 mls @ 100 mls/hr IVPB Q8H [facility]


   Last Admin: 10/28/17 14:05 Dose:  100 mls/hr


BUPIVACAINE 0.125%/0.9% NACL (Bupivacaine-Ns 0.125% On-Q )  600 mls @ 4 mls/

hr IJ ONCE ONE


   Stop: 11/01/17 18:14


   Last Admin: 10/26/17 14:50 Dose:  0 mls


Vancomycin/Sodium Chloride (Vancomycin 1 Gm/Ns 200 Ml)  1 gm in 200 mls @ 133 

mls/hr IVPB Q24H [facility]


   Stop: 10/31/17 20:01


   Last Admin: 10/28/17 22:36 Dose:  133 mls/hr


Lactated Ringer's (Lactated Ringer's)  1,000 mls @ 50 mls/hr IV .Q20H [facility]


   Last Admin: 10/28/17 22:45 Dose:  Not Given


Insulin Human Regular (Novolin R)  0 unit SC ACHS [facility]


   PRN Reason: Protocol


   Last Admin: 10/28/17 22:48 Dose:  Not Given


Lactulose (Enulose)  20 gm PO Q6H [facility]


   Last Admin: 10/28/17 20:00 Dose:  20 gm


Ondansetron HCl (Zofran Inj)  4 mg IVP Q4 PRN


   PRN Reason: Nausea/Vomiting


Pantoprazole Sodium (Protonix Inj)  40 mg IVP DAILY [facility]


   Last Admin: 10/28/17 11:02 Dose:  40 mg


Potassium Phos/Sodium Phos (Neutra-Phos)  1 pkt PO TID [facility]


   Last Admin: 10/28/17 18:00 Dose:  1 pkt











- Labs


Labs: 


 





 10/28/17 06:27 





 10/28/17 06:27 





 











PT  15.5 SECONDS (9.7-12.2)  H  10/24/17  03:58    


 


INR  1.4   10/24/17  03:58    


 


APTT  28 SECONDS (21-34)   10/24/17  03:58    














- Constitutional


Appears: No Acute Distress





- Head Exam


Head Exam: ATRAUMATIC, NORMAL INSPECTION, NORMOCEPHALIC





- Eye Exam


Eye Exam: Normal appearance





- ENT Exam


ENT Exam: Normal Exam





- Neck Exam


Neck Exam: Normal Inspection





- Respiratory Exam


Respiratory Exam: Clear to Ausculation Bilateral, NORMAL BREATHING PATTERN





- Cardiovascular Exam


Cardiovascular Exam: REGULAR RHYTHM





- GI/Abdominal Exam


GI & Abdominal Exam: Hypoactive Bowel Sounds


Additional comments: 





+ ileus per imaging





- Rectal Exam


Rectal Exam: Deferred





- Extremities Exam


Extremities Exam: Normal Inspection





- Back Exam


Back Exam: NORMAL INSPECTION





- Neurological Exam


Neuro motor strength exam: Left Upper Extremity: 4, Right Upper Extremity: 2/1, 

Left Lower Extremity: 4, Right Lower Extremity: 0





- Psychiatric Exam


Psychiatric exam: Normal Affect, Normal Mood





- Skin


Skin Exam: Dry, Normal Color, Warm





Assessment and Plan


(1) Endometritis


Assessment & Plan: 


culture results 


Status: Acute   





(2) Fecal impaction


Assessment & Plan: 


monitoring if with improvement


Status: Acute   





(3) Toxic megacolon


Status: Acute   





(4) Colitis


Assessment & Plan: 


no colitis





Status: Resolved   





(5) Diabetes mellitus


Status: Chronic   





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


Status: Chronic

## 2017-10-29 LAB
ALBUMIN/GLOB SERPL: 0.9 {RATIO} (ref 1–2.1)
ALP SERPL-CCNC: 46 U/L (ref 38–126)
ALT SERPL-CCNC: 40 U/L (ref 9–52)
AST SERPL-CCNC: 33 U/L (ref 14–36)
BASOPHILS # BLD AUTO: 0.1 K/UL (ref 0–0.2)
BASOPHILS NFR BLD: 0.5 % (ref 0–2)
BILIRUB SERPL-MCNC: 1.1 MG/DL (ref 0.2–1.3)
BUN SERPL-MCNC: 14 MG/DL (ref 7–17)
CALCIUM SERPL-MCNC: 7.4 MG/DL (ref 8.6–10.4)
CHLORIDE SERPL-SCNC: 107 MMOL/L (ref 98–107)
CO2 SERPL-SCNC: 24 MMOL/L (ref 22–30)
EOSINOPHIL # BLD AUTO: 0 K/UL (ref 0–0.7)
EOSINOPHIL NFR BLD: 0.2 % (ref 0–4)
ERYTHROCYTE [DISTWIDTH] IN BLOOD BY AUTOMATED COUNT: 13.9 % (ref 11.5–14.5)
GLOBULIN SER-MCNC: 3 GM/DL (ref 2.2–3.9)
GLUCOSE SERPL-MCNC: 117 MG/DL (ref 65–105)
HCT VFR BLD CALC: 32.5 % (ref 34–47)
LYMPHOCYTES # BLD AUTO: 0.5 K/UL (ref 1–4.3)
LYMPHOCYTES NFR BLD AUTO: 4.2 % (ref 20–40)
MCH RBC QN AUTO: 29.5 PG (ref 27–31)
MCHC RBC AUTO-ENTMCNC: 32.8 G/DL (ref 33–37)
MCV RBC AUTO: 89.8 FL (ref 81–99)
MONOCYTES # BLD: 0.4 K/UL (ref 0–0.8)
MONOCYTES NFR BLD: 3.5 % (ref 0–10)
NEUTROPHILS NFR BLD AUTO: 91 % (ref 50–75)
NRBC BLD AUTO-RTO: 0 % (ref 0–2)
PLATELET # BLD: 240 K/UL (ref 130–400)
PMV BLD AUTO: 8.5 FL (ref 7.2–11.7)
POTASSIUM SERPL-SCNC: 3 MMOL/L (ref 3.6–5.2)
PROT SERPL-MCNC: 5.7 G/DL (ref 6.3–8.3)
SODIUM SERPL-SCNC: 141 MMOL/L (ref 132–148)
T4 FREE SERPL-MCNC: 1.08 NG/DL (ref 0.78–2.19)
TOTAL CELLS COUNTED BLD: 100
TSH SERPL-ACNC: 0.46 MIU/L (ref 0.46–4.68)
WBC # BLD AUTO: 12.4 K/UL (ref 4.8–10.8)

## 2017-10-29 RX ADMIN — ALBUTEROL SULFATE SCH MG: 2.5 SOLUTION RESPIRATORY (INHALATION) at 20:05

## 2017-10-29 RX ADMIN — VANCOMYCIN HYDROCHLORIDE SCH MLS/HR: 1 INJECTION, POWDER, LYOPHILIZED, FOR SOLUTION INTRAVENOUS at 22:15

## 2017-10-29 RX ADMIN — ALBUTEROL SULFATE SCH: 2.5 SOLUTION RESPIRATORY (INHALATION) at 03:58

## 2017-10-29 RX ADMIN — ALBUTEROL SULFATE SCH MG: 2.5 SOLUTION RESPIRATORY (INHALATION) at 07:19

## 2017-10-29 RX ADMIN — HUMAN INSULIN SCH: 100 INJECTION, SOLUTION SUBCUTANEOUS at 09:23

## 2017-10-29 RX ADMIN — POTASSIUM & SODIUM PHOSPHATES POWDER PACK 280-160-250 MG SCH PKT: 280-160-250 PACK at 18:44

## 2017-10-29 RX ADMIN — HUMAN INSULIN SCH: 100 INJECTION, SOLUTION SUBCUTANEOUS at 16:08

## 2017-10-29 RX ADMIN — HUMAN INSULIN SCH: 100 INJECTION, SOLUTION SUBCUTANEOUS at 12:30

## 2017-10-29 RX ADMIN — HUMAN INSULIN SCH: 100 INJECTION, SOLUTION SUBCUTANEOUS at 22:21

## 2017-10-29 RX ADMIN — TAZOBACTAM SODIUM AND PIPERACILLIN SODIUM SCH MLS/HR: 375; 3 INJECTION, SOLUTION INTRAVENOUS at 08:00

## 2017-10-29 RX ADMIN — ALBUTEROL SULFATE SCH MG: 2.5 SOLUTION RESPIRATORY (INHALATION) at 11:10

## 2017-10-29 RX ADMIN — WATER SCH MLS/HR: 1 INJECTION INTRAMUSCULAR; INTRAVENOUS; SUBCUTANEOUS at 06:25

## 2017-10-29 RX ADMIN — ALBUTEROL SULFATE SCH MG: 2.5 SOLUTION RESPIRATORY (INHALATION) at 15:36

## 2017-10-29 RX ADMIN — TAZOBACTAM SODIUM AND PIPERACILLIN SODIUM SCH MLS/HR: 375; 3 INJECTION, SOLUTION INTRAVENOUS at 01:26

## 2017-10-29 RX ADMIN — POTASSIUM & SODIUM PHOSPHATES POWDER PACK 280-160-250 MG SCH PKT: 280-160-250 PACK at 11:00

## 2017-10-29 RX ADMIN — POTASSIUM & SODIUM PHOSPHATES POWDER PACK 280-160-250 MG SCH PKT: 280-160-250 PACK at 14:24

## 2017-10-29 RX ADMIN — ALBUTEROL SULFATE SCH: 2.5 SOLUTION RESPIRATORY (INHALATION) at 00:36

## 2017-10-29 NOTE — CP.PCM.PN
Subjective





- Date & Time of Evaluation


Date of Evaluation: 10/29/17


Time of Evaluation: 09:00





- Subjective


Subjective: 





afeb


awake alert NAD








Objective





- Vital Signs/Intake and Output


Vital Signs (last 24 hours): 


 











Temp Pulse Resp BP Pulse Ox


 


 97.3 F L  94 H  20   159/89 H  98 


 


 10/29/17 04:51  10/29/17 04:51  10/29/17 04:51  10/29/17 04:51  10/29/17 00:00








Intake and Output: 


 











 10/29/17 10/29/17





 06:59 18:59


 


Intake Total 850 


 


Output Total 940 


 


Balance -90 














- Medications


Medications: 


 Current Medications





Acetaminophen (Tylenol 325mg Tab)  325 mg PO Q4 PRN


   PRN Reason: Fever >100.4 F


Albuterol Sulfate (Albuterol 0.083% Inhal Sol (2.5 Mg/3 Ml) Ud)  2.5 mg IH RQ4 

[facility]


   Last Admin: 10/29/17 11:10 Dose:  2.5 mg


Heparin Sodium (Porcine) (Heparin)  5,000 units SC Q12 [facility]


   Last Admin: 10/29/17 11:20 Dose:  5,000 units


Hydromorphone HCl (Dilaudid)  0.5 mg IVP Q4H PRN


   PRN Reason: Pain, moderate (4-7)


   Last Admin: 10/27/17 13:55 Dose:  0.5 mg


Piperacillin Sod/Tazobactam Sod (Zosyn 3.375 Gm Iv Premix)  3.375 gm in 50 mls 

@ 200 mls/hr IVPB Q8H [facility]


   Last Admin: 10/29/17 08:00 Dose:  200 mls/hr


BUPIVACAINE 0.125%/0.9% NACL (Bupivacaine-Ns 0.125% On-Q )  600 mls @ 4 mls/

hr IJ ONCE ONE


   Stop: 11/01/17 18:14


   Last Admin: 10/26/17 14:50 Dose:  0 mls


Vancomycin/Sodium Chloride (Vancomycin 1 Gm/Ns 200 Ml)  1 gm in 200 mls @ 133 

mls/hr IVPB Q24H [facility]


   Stop: 10/31/17 20:01


   Last Admin: 10/28/17 22:36 Dose:  133 mls/hr


Lactated Ringer's (Lactated Ringer's)  1,000 mls @ 50 mls/hr IV .Q20H [facility]


   Last Admin: 10/28/17 22:45 Dose:  Not Given


Insulin Human Regular (Novolin R)  0 unit SC ACHS [facility]


   PRN Reason: Protocol


   Last Admin: 10/29/17 09:23 Dose:  Not Given


Lactulose (Enulose)  20 gm PO Q6H [facility]


   Last Admin: 10/29/17 09:21 Dose:  Not Given


Ondansetron HCl (Zofran Inj)  4 mg IVP Q4 PRN


   PRN Reason: Nausea/Vomiting


Pantoprazole Sodium (Protonix Inj)  40 mg IVP DAILY [facility]


   Last Admin: 10/28/17 11:02 Dose:  40 mg


Potassium Phos/Sodium Phos (Neutra-Phos)  1 pkt PO TID [facility]


   Last Admin: 10/29/17 11:00 Dose:  1 pkt











- Labs


Labs: 


 





 10/29/17 09:27 





 10/29/17 09:27 





 











PT  15.5 SECONDS (9.7-12.2)  H  10/24/17  03:58    


 


INR  1.4   10/24/17  03:58    


 


APTT  28 SECONDS (21-34)   10/24/17  03:58    














- Constitutional


Appears: Non-toxic, Chronically Ill





- Head Exam


Head Exam: NORMOCEPHALIC





- Eye Exam


Eye Exam: PERRL





- ENT Exam


ENT Exam: Mucous Membranes Dry





- Neck Exam


Neck Exam: absent: Lymphadenopathy





- Respiratory Exam


Respiratory Exam: Decreased Breath Sounds





- Cardiovascular Exam


Cardiovascular Exam: REGULAR RHYTHM





- GI/Abdominal Exam


GI & Abdominal Exam: Distended





- Rectal Exam


Rectal Exam: Deferred





-  Exam


 Exam: NORMAL INSPECTION





- Extremities Exam


Extremities Exam: absent: Pedal Edema





- Back Exam


Back Exam: absent: CVA tenderness (L), CVA tenderness (R)





Assessment and Plan


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


Status: Acute   





(2) Diabetes mellitus


Status: Chronic   





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


Status: Chronic   





- Assessment and Plan (Free Text)


Assessment: 





cont IV rx as ordered


await path report

## 2017-10-29 NOTE — RAD
Abdomen three views 



History: Colitis. 



Comparison: 10/28/2017 



Findings: 



Moderate venous congestion. 



Small left pleural effusion. 



Bilateral hilar prominence. 



Cardiomegaly. 



Small nodular density at the right lung apex, nonspecific. 



Degenerative changes in the spine and shoulders. 



Surgical drain projects over the midline pelvis. 



Surgical staples. 



Multiple dilated loops of small bowel seen within the mid and lower 

abdomen which may represent postoperative ileus. 



Diffuse gaseous distention of the colon. 



Impression: 



Postoperative changes.

## 2017-10-29 NOTE — CP.PCM.PN
Subjective





- Date & Time of Evaluation


Date of Evaluation: 10/29/17


Time of Evaluation: 22:00





- Subjective


Subjective: 


Pt's clinical status continues to improve. WBC down to 12.4 from high of 34,

000. Cultures back with E.coli from pyomyometra. Meds adjusted by ID. 


> Pt started on po liquid feeds but unable to tolerate at times as evidenced by 

vomiting. 


> Pt no complaints of pain. Continue current mgt. 





Objective





- Vital Signs/Intake and Output


Vital Signs (last 24 hours): 


 











Temp Pulse Resp BP Pulse Ox


 


 98 F   96 H  20   147/83   96 


 


 10/29/17 15:48  10/29/17 15:48  10/29/17 15:48  10/29/17 15:48  10/29/17 15:48








Intake and Output: 


 











 10/29/17 10/30/17





 18:59 06:59


 


Intake Total  400


 


Output Total 840 520


 


Balance -840 -120














- Medications


Medications: 


 Current Medications





Acetaminophen (Tylenol 325mg Tab)  325 mg PO Q4 PRN


   PRN Reason: Fever >100.4 F


Albuterol Sulfate (Albuterol 0.083% Inhal Sol (2.5 Mg/3 Ml) Ud)  2.5 mg IH RQ4 

[facility]


   Last Admin: 10/29/17 20:05 Dose:  2.5 mg


Heparin Sodium (Porcine) (Heparin)  5,000 units SC Q12 [facility]


   Last Admin: 10/29/17 22:16 Dose:  5,000 units


Hydromorphone HCl (Dilaudid)  0.5 mg IVP Q4H PRN


   PRN Reason: Pain, moderate (4-7)


   Last Admin: 10/27/17 13:55 Dose:  0.5 mg


BUPIVACAINE 0.125%/0.9% NACL (Bupivacaine-Ns 0.125% On-Q )  600 mls @ 4 mls/

hr IJ ONCE ONE


   Stop: 11/01/17 18:14


   Last Admin: 10/26/17 14:50 Dose:  0 mls


Vancomycin/Sodium Chloride (Vancomycin 1 Gm/Ns 200 Ml)  1 gm in 200 mls @ 133 

mls/hr IVPB Q24H [facility]


   Stop: 10/31/17 20:01


   Last Admin: 10/29/17 22:15 Dose:  133 mls/hr


Lactated Ringer's (Lactated Ringer's)  1,000 mls @ 50 mls/hr IV .Q20H [facility]


   Last Admin: 10/29/17 14:25 Dose:  Not Given


Meropenem 500 mg/ Sodium (Chloride)  100 mls @ 100 mls/hr IVPB Q8H [facility]


   Last Admin: 10/29/17 16:50 Dose:  100 mls/hr


Insulin Human Regular (Novolin R)  0 unit SC ACHS [facility]


   PRN Reason: Protocol


   Last Admin: 10/29/17 22:21 Dose:  Not Given


Lactulose (Enulose)  20 gm PO Q6H [facility]


   Last Admin: 10/29/17 22:21 Dose:  20 gm


Ondansetron HCl (Zofran Inj)  4 mg IVP Q4 PRN


   PRN Reason: Nausea/Vomiting


Pantoprazole Sodium (Protonix Inj)  40 mg IVP DAILY [facility]


   Last Admin: 10/29/17 14:20 Dose:  40 mg


Potassium Chloride (Potassium Chloride Oral Soln)  20 meq PO BID [facility]


Potassium Phos/Sodium Phos (Neutra-Phos)  1 pkt PO TID [facility]


   Last Admin: 10/29/17 18:44 Dose:  1 pkt











- Labs


Labs: 


 





 10/29/17 09:27 





 10/29/17 09:27 





 











PT  15.5 SECONDS (9.7-12.2)  H  10/24/17  03:58    


 


INR  1.4   10/24/17  03:58    


 


APTT  28 SECONDS (21-34)   10/24/17  03:58    














- Constitutional


Appears: No Acute Distress





- Head Exam


Head Exam: NORMAL INSPECTION





- Eye Exam


Eye Exam: Normal appearance, PERRL


Pupil Exam: NORMAL ACCOMODATION





- ENT Exam


ENT Exam: Normal Exam





- Neck Exam


Neck Exam: Normal Inspection





- Respiratory Exam


Respiratory Exam: Clear to Ausculation Bilateral, NORMAL BREATHING PATTERN





- Cardiovascular Exam


Cardiovascular Exam: REGULAR RHYTHM





- GI/Abdominal Exam


GI & Abdominal Exam: Soft, Normal Bowel Sounds





- Rectal Exam


Rectal Exam: Deferred





- Extremities Exam


Extremities Exam: Normal Inspection


Additional comments: 





decreased swellin on pt's extremties                                           

                                                                               

                                                                               

                                                                               

                                                                               

           





- Back Exam


Back Exam: NORMAL INSPECTION


Additional comments: 





+ pressure ulcer 





- Neurological Exam


Neurological Exam: Awake, CN II-XII Intact, Motor Sensory Deficit


Neuro motor strength exam: Right Upper Extremity: 2/1, Right Lower Extremity: 2/

1





- Psychiatric Exam


Psychiatric exam: Normal Affect, Normal Mood





- Skin


Skin Exam: Erythema, Intact, Normal Color, Pallor





Assessment and Plan


(1) Endometritis


Assessment & Plan: 


continue IV abx. will order PICC line enable long-term IV abx


Status: Acute   





(2) Fecal impaction


Assessment & Plan: 


still present. no BMs noted in chart still          


Status: Acute   





(3) Colitis


Assessment & Plan: 


no definitive S & S


Status: Resolved   





(4) Old cardioembolic stroke with hemiparesis of dominant side


Assessment & Plan: 


stable, no neurologic change


Status: Chronic   





(5) Diabetes mellitus


Assessment & Plan: 


gastroparesis from DM surely do                


Status: Chronic

## 2017-10-29 NOTE — CP.PCM.PN
Subjective





- Date & Time of Evaluation


Date of Evaluation: 10/29/17


Time of Evaluation: 07:00





- Subjective


Subjective: 


SURGERY PROGRESS NOTE FOR DR. GARNER





Patient seen and examined at bedside. She vomited yesterday after lunch. She 

had a liquid BM this AM. There were no acute events overnight per nurse.





Objective





- Vital Signs/Intake and Output


Vital Signs (last 24 hours): 


 











Temp Pulse Resp BP Pulse Ox


 


 97.3 F L  94 H  20   159/89 H  98 


 


 10/29/17 04:51  10/29/17 04:51  10/29/17 04:51  10/29/17 04:51  10/29/17 00:00








Intake and Output: 


 











 10/29/17 10/29/17





 06:59 18:59


 


Intake Total 850 


 


Output Total 940 


 


Balance -90 














- Medications


Medications: 


 Current Medications





Acetaminophen (Tylenol 325mg Tab)  325 mg PO Q4 PRN


   PRN Reason: Fever >100.4 F


Albuterol Sulfate (Albuterol 0.083% Inhal Sol (2.5 Mg/3 Ml) Ud)  2.5 mg IH RQ4 

[facility]


   Last Admin: 10/29/17 15:36 Dose:  2.5 mg


Heparin Sodium (Porcine) (Heparin)  5,000 units SC Q12 [facility]


   Last Admin: 10/29/17 11:20 Dose:  5,000 units


Hydromorphone HCl (Dilaudid)  0.5 mg IVP Q4H PRN


   PRN Reason: Pain, moderate (4-7)


   Last Admin: 10/27/17 13:55 Dose:  0.5 mg


BUPIVACAINE 0.125%/0.9% NACL (Bupivacaine-Ns 0.125% On-Q )  600 mls @ 4 mls/

hr IJ ONCE ONE


   Stop: 11/01/17 18:14


   Last Admin: 10/26/17 14:50 Dose:  0 mls


Vancomycin/Sodium Chloride (Vancomycin 1 Gm/Ns 200 Ml)  1 gm in 200 mls @ 133 

mls/hr IVPB Q24H [facility]


   Stop: 10/31/17 20:01


   Last Admin: 10/28/17 22:36 Dose:  133 mls/hr


Lactated Ringer's (Lactated Ringer's)  1,000 mls @ 50 mls/hr IV .Q20H [facility]


   Last Admin: 10/29/17 14:25 Dose:  Not Given


Potassium Chloride (Potassium Chloride 10 Meq/100 Ml)  10 meq in 100 mls @ 100 

mls/hr IVPB Q2 [facility]


   Stop: 10/29/17 16:59


   Last Admin: 10/29/17 16:27 Dose:  100 mls/hr


Meropenem 500 mg/ Sodium (Chloride)  100 mls @ 100 mls/hr IVPB Q8H [facility]


Insulin Human Regular (Novolin R)  0 unit SC ACHS [facility]


   PRN Reason: Protocol


   Last Admin: 10/29/17 16:08 Dose:  Not Given


Lactulose (Enulose)  20 gm PO Q6H [facility]


   Last Admin: 10/29/17 14:02 Dose:  Not Given


Ondansetron HCl (Zofran Inj)  4 mg IVP Q4 PRN


   PRN Reason: Nausea/Vomiting


Pantoprazole Sodium (Protonix Inj)  40 mg IVP DAILY [facility]


   Last Admin: 10/29/17 14:20 Dose:  40 mg


Potassium Phos/Sodium Phos (Neutra-Phos)  1 pkt PO TID [facility]


   Last Admin: 10/29/17 14:24 Dose:  1 pkt











- Labs


Labs: 


 





 10/29/17 09:27 





 10/29/17 09:27 





 











PT  15.5 SECONDS (9.7-12.2)  H  10/24/17  03:58    


 


INR  1.4   10/24/17  03:58    


 


APTT  28 SECONDS (21-34)   10/24/17  03:58    














- Constitutional


Appears: Non-toxic, No Acute Distress





- Respiratory Exam


Respiratory Exam: NORMAL BREATHING PATTERN.  absent: Respiratory Distress





- Cardiovascular Exam


Cardiovascular Exam: +S1, +S2





- GI/Abdominal Exam


GI & Abdominal Exam: Soft, Tenderness (some tenderness around incision).  absent

: Distended, Firm, Guarding, Rigid, Rebound





- Neurological Exam


Neurological Exam: Alert, Awake





- Psychiatric Exam


Psychiatric exam: Normal Affect, Normal Mood





- Skin


Skin Exam: Dry, Normal Color, Warm





Assessment and Plan





- Assessment and Plan (Free Text)


Assessment: 


76F w. uterine infection, L adenexal mass, s/p KASSY w. BSO, POD#3





- Afebrile, VSS


- Leukocytosis trending down, WBC 12.4 today


- Hypokalemia - replaced


- C diff negative


- Keep spring in place 


- Removed On-Q since it was empty. Continue PRN pain medication


- Cx from OR grew E. Coli, Cont abx per ID


- Encouraged ambulation and IS use, PT ordered


- Will FU Path report


- Discussed plan with Dr. Radha Eaton PGY-3

## 2017-10-30 LAB
ALBUMIN/GLOB SERPL: 0.9 {RATIO} (ref 1–2.1)
ALP SERPL-CCNC: 43 U/L (ref 38–126)
ALT SERPL-CCNC: 36 U/L (ref 9–52)
AST SERPL-CCNC: 18 U/L (ref 14–36)
BASOPHILS # BLD AUTO: 0 K/UL (ref 0–0.2)
BASOPHILS NFR BLD: 0.3 % (ref 0–2)
BILIRUB SERPL-MCNC: 0.6 MG/DL (ref 0.2–1.3)
BUN SERPL-MCNC: 13 MG/DL (ref 7–17)
CALCIUM SERPL-MCNC: 7.1 MG/DL (ref 8.6–10.4)
CHLORIDE SERPL-SCNC: 106 MMOL/L (ref 98–107)
CO2 SERPL-SCNC: 24 MMOL/L (ref 22–30)
EOSINOPHIL # BLD AUTO: 0 K/UL (ref 0–0.7)
EOSINOPHIL NFR BLD: 0.3 % (ref 0–4)
ERYTHROCYTE [DISTWIDTH] IN BLOOD BY AUTOMATED COUNT: 13.8 % (ref 11.5–14.5)
GLOBULIN SER-MCNC: 2.5 GM/DL (ref 2.2–3.9)
GLUCOSE SERPL-MCNC: 93 MG/DL (ref 65–105)
HCT VFR BLD CALC: 29.5 % (ref 34–47)
LYMPHOCYTES # BLD AUTO: 0.9 K/UL (ref 1–4.3)
LYMPHOCYTES NFR BLD AUTO: 8.5 % (ref 20–40)
MAGNESIUM SERPL-MCNC: 1.4 MG/DL (ref 1.6–2.3)
MCH RBC QN AUTO: 29.9 PG (ref 27–31)
MCHC RBC AUTO-ENTMCNC: 33.8 G/DL (ref 33–37)
MCV RBC AUTO: 88.4 FL (ref 81–99)
MONOCYTES # BLD: 0.6 K/UL (ref 0–0.8)
MONOCYTES NFR BLD: 5.2 % (ref 0–10)
NEUTROPHILS NFR BLD AUTO: 89 % (ref 50–75)
NRBC BLD AUTO-RTO: 0 % (ref 0–2)
PLATELET # BLD: 264 K/UL (ref 130–400)
PMV BLD AUTO: 8.1 FL (ref 7.2–11.7)
POTASSIUM SERPL-SCNC: 2.5 MMOL/L (ref 3.6–5.2)
PROT SERPL-MCNC: 4.7 G/DL (ref 6.3–8.3)
SODIUM SERPL-SCNC: 140 MMOL/L (ref 132–148)
TOTAL CELLS COUNTED BLD: 100
WBC # BLD AUTO: 10.9 K/UL (ref 4.8–10.8)

## 2017-10-30 RX ADMIN — POTASSIUM CHLORIDE SCH MEQ: 1.5 SOLUTION ORAL at 18:53

## 2017-10-30 RX ADMIN — POTASSIUM & SODIUM PHOSPHATES POWDER PACK 280-160-250 MG SCH PKT: 280-160-250 PACK at 09:34

## 2017-10-30 RX ADMIN — ALBUTEROL SULFATE SCH: 2.5 SOLUTION RESPIRATORY (INHALATION) at 23:44

## 2017-10-30 RX ADMIN — MAGNESIUM SULFATE IN DEXTROSE SCH: 10 INJECTION, SOLUTION INTRAVENOUS at 13:16

## 2017-10-30 RX ADMIN — POTASSIUM & SODIUM PHOSPHATES POWDER PACK 280-160-250 MG SCH PKT: 280-160-250 PACK at 18:50

## 2017-10-30 RX ADMIN — HUMAN INSULIN SCH: 100 INJECTION, SOLUTION SUBCUTANEOUS at 08:30

## 2017-10-30 RX ADMIN — ALBUTEROL SULFATE SCH MG: 2.5 SOLUTION RESPIRATORY (INHALATION) at 20:13

## 2017-10-30 RX ADMIN — HYDROMORPHONE HYDROCHLORIDE PRN MG: 1 INJECTION, SOLUTION INTRAMUSCULAR; INTRAVENOUS; SUBCUTANEOUS at 13:06

## 2017-10-30 RX ADMIN — POTASSIUM & SODIUM PHOSPHATES POWDER PACK 280-160-250 MG SCH: 280-160-250 PACK at 15:04

## 2017-10-30 RX ADMIN — MAGNESIUM SULFATE IN DEXTROSE SCH: 10 INJECTION, SOLUTION INTRAVENOUS at 13:17

## 2017-10-30 RX ADMIN — POTASSIUM CHLORIDE SCH MEQ: 1.5 SOLUTION ORAL at 09:38

## 2017-10-30 RX ADMIN — ALBUTEROL SULFATE SCH MG: 2.5 SOLUTION RESPIRATORY (INHALATION) at 16:03

## 2017-10-30 RX ADMIN — MAGNESIUM SULFATE IN DEXTROSE SCH: 10 INJECTION, SOLUTION INTRAVENOUS at 18:24

## 2017-10-30 RX ADMIN — HUMAN INSULIN SCH: 100 INJECTION, SOLUTION SUBCUTANEOUS at 18:51

## 2017-10-30 RX ADMIN — ALBUTEROL SULFATE SCH MG: 2.5 SOLUTION RESPIRATORY (INHALATION) at 11:25

## 2017-10-30 RX ADMIN — HUMAN INSULIN SCH UNIT: 100 INJECTION, SOLUTION SUBCUTANEOUS at 13:05

## 2017-10-30 RX ADMIN — ALBUTEROL SULFATE SCH: 2.5 SOLUTION RESPIRATORY (INHALATION) at 00:25

## 2017-10-30 RX ADMIN — ALBUTEROL SULFATE SCH MG: 2.5 SOLUTION RESPIRATORY (INHALATION) at 07:52

## 2017-10-30 RX ADMIN — MAGNESIUM SULFATE IN DEXTROSE SCH MLS/HR: 10 INJECTION, SOLUTION INTRAVENOUS at 13:35

## 2017-10-30 RX ADMIN — HUMAN INSULIN SCH: 100 INJECTION, SOLUTION SUBCUTANEOUS at 22:55

## 2017-10-30 NOTE — CP.PCM.PN
Subjective





- Date & Time of Evaluation


Date of Evaluation: 10/30/17


Time of Evaluation: 14:26





- Subjective


Subjective: 





Surgery: Dr. Garcia





Pt seen and examined. Resting comfortably in bed. Per nursing, no acute events 

overnight. 











Objective





- Vital Signs/Intake and Output


Vital Signs (last 24 hours): 


 











Temp Pulse Resp BP Pulse Ox


 


 97 F L  100 H  20   146/84   96 


 


 10/30/17 08:23  10/30/17 08:23  10/30/17 08:23  10/30/17 08:23  10/30/17 08:23








Intake and Output: 


 











 10/30/17 10/30/17





 06:59 18:59


 


Intake Total 600 


 


Output Total 1070 


 


Balance -470 














- Medications


Medications: 


 Current Medications





Acetaminophen (Tylenol 325mg Tab)  325 mg PO Q4 PRN


   PRN Reason: Fever >100.4 F


Albuterol Sulfate (Albuterol 0.083% Inhal Sol (2.5 Mg/3 Ml) Ud)  2.5 mg IH RQ4 

[facility]


   Last Admin: 10/30/17 11:25 Dose:  2.5 mg


Heparin Sodium (Porcine) (Heparin)  5,000 units SC Q12 [facility]


   Last Admin: 10/30/17 09:35 Dose:  5,000 units


Hydromorphone HCl (Dilaudid)  0.5 mg IVP Q4H PRN


   PRN Reason: Pain, moderate (4-7)


   Last Admin: 10/30/17 13:06 Dose:  0.5 mg


BUPIVACAINE 0.125%/0.9% NACL (Bupivacaine-Ns 0.125% On-Q )  600 mls @ 4 mls/

hr IJ ONCE ONE


   Stop: 11/01/17 18:14


   Last Admin: 10/26/17 14:50 Dose:  0 mls


Lactated Ringer's (Lactated Ringer's)  1,000 mls @ 50 mls/hr IV .Q20H [facility]


   Last Admin: 10/30/17 09:33 Dose:  Not Given


Meropenem 500 mg/ Sodium (Chloride)  100 mls @ 100 mls/hr IVPB Q8H [facility]


   Last Admin: 10/30/17 09:34 Dose:  100 mls/hr


Potassium Chloride 20 meq/ (Sodium Chloride)  110 mls @ 50 mls/hr IV Q2H [facility]


   Stop: 10/30/17 15:59


   Last Admin: 10/30/17 13:03 Dose:  50 mls/hr


Magnesium Sulfate/Dextrose (Magnesium Sulfate 1 Gm/100 Ml D5w)  1 gm in 100 mls 

@ 200 mls/hr IVPB Q30M [facility]


   Stop: 10/30/17 14:29


   Last Admin: 10/30/17 13:35 Dose:  200 mls/hr


Insulin Human Regular (Novolin R)  0 unit SC ACHS [facility]


   PRN Reason: Protocol


   Last Admin: 10/30/17 13:05 Dose:  1 unit


Lactulose (Enulose)  20 gm PO Q6H [facility]


   Last Admin: 10/30/17 13:48 Dose:  Not Given


Metoclopramide HCl (Reglan)  5 mg IVP ACHS [facility]


   Last Admin: 10/30/17 13:08 Dose:  5 mg


Ondansetron HCl (Zofran Inj)  4 mg IVP Q4 PRN


   PRN Reason: Nausea/Vomiting


Pantoprazole Sodium (Protonix Inj)  40 mg IVP DAILY [facility]


   Last Admin: 10/30/17 09:35 Dose:  40 mg


Potassium Chloride (Potassium Chloride Oral Soln)  20 meq PO BID [facility]


   Last Admin: 10/30/17 09:38 Dose:  20 meq


Potassium Phos/Sodium Phos (Neutra-Phos)  1 pkt PO TID [facility]


   Last Admin: 10/30/17 09:34 Dose:  1 pkt











- Labs


Labs: 


 





 10/30/17 06:58 





 10/30/17 06:58 





 











PT  15.5 SECONDS (9.7-12.2)  H  10/24/17  03:58    


 


INR  1.4   10/24/17  03:58    


 


APTT  28 SECONDS (21-34)   10/24/17  03:58    














- Constitutional


Appears: Non-toxic, No Acute Distress





- Head Exam


Head Exam: ATRAUMATIC, NORMOCEPHALIC





- Eye Exam


Eye Exam: EOMI





- ENT Exam


ENT Exam: Mucous Membranes Moist





- Neck Exam


Neck Exam: Full ROM





- Respiratory Exam


Respiratory Exam: NORMAL BREATHING PATTERN.  absent: Accessory Muscle Use, 

Respiratory Distress





- GI/Abdominal Exam


GI & Abdominal Exam: Soft.  absent: Distended, Firm, Guarding, Rigid, Tenderness

, Rebound





- Extremities Exam


Extremities Exam: absent: Calf Tenderness, Pedal Edema





- Neurological Exam


Neurological Exam: Alert, Awake, Oriented x3





- Skin


Skin Exam: Dry, Normal Color, Warm





Assessment and Plan





- Assessment and Plan (Free Text)


Assessment: 





76F w. uterine infection, L adenexal mass, s/p KASSY w. BSO, POD#4


-Path negative for malignancy


-Taqureia: 110cc/24hr serosang, continue to monitor


-Spring: 1800cc/24hr, leave spring in place for 1 week minimum


-abx per ID


-hypokalemia: repleted by medicine


-will advanced diet to HHD mechanical soft


-encourage OOB and IS use


-will continue to follow


-d/w attending





Cassandra PGY3

## 2017-10-30 NOTE — CP.PCM.PN
Subjective





- Date & Time of Evaluation


Date of Evaluation: 10/30/17


Time of Evaluation: 21:50





- Subjective


Subjective: 





Pt reains afebrile, IV abx adjusted by ID with contining improvement. WBC down 

to almost normal levels. 


> Pt has had BM, though only occasionally. Appears on course for complete 

recovery. 


> PICC line for installation in the AM





Objective





- Vital Signs/Intake and Output


Vital Signs (last 24 hours): 


 











Temp Pulse Resp BP Pulse Ox


 


 98.3 F   107 H  20   162/91 H  99 


 


 10/30/17 17:42  10/30/17 17:42  10/30/17 17:42  10/30/17 17:42  10/30/17 17:42








Intake and Output: 


 











 10/30/17 10/31/17





 18:59 06:59


 


Intake Total 400 


 


Output Total 560 


 


Balance -160 














- Medications


Medications: 


 Current Medications





Acetaminophen (Tylenol 325mg Tab)  325 mg PO Q4 PRN


   PRN Reason: Fever >100.4 F


Albuterol Sulfate (Albuterol 0.083% Inhal Sol (2.5 Mg/3 Ml) Ud)  2.5 mg IH RQ4 

[facility]


   Last Admin: 10/30/17 20:13 Dose:  2.5 mg


Heparin Sodium (Porcine) (Heparin)  5,000 units SC Q12 [facility]


   Last Admin: 10/30/17 09:35 Dose:  5,000 units


Hydromorphone HCl (Dilaudid)  0.5 mg IVP Q4H [facility]


   PRN Reason: Pain, moderate (4-7)


   Last Admin: 10/30/17 13:06 Dose:  0.5 mg


BUPIVACAINE 0.125%/0.9% NACL (Bupivacaine-Ns 0.125% On-Q )  600 mls @ 4 mls/

hr IJ ONCE ONE


   Stop: 11/01/17 18:14


   Last Admin: 10/26/17 14:50 Dose:  0 mls


Lactated Ringer's (Lactated Ringer's)  1,000 mls @ 50 mls/hr IV .Q20H [facility]


   Last Admin: 10/30/17 09:33 Dose:  Not Given


Meropenem 500 mg/ Dextrose  100 mls @ 100 mls/hr IVPB Q8H [facility]


Insulin Human Regular (Novolin R)  0 unit SC ACHS [facility]


   PRN Reason: Protocol


   Last Admin: 10/30/17 18:51 Dose:  Not Given


Lactulose (Enulose)  20 gm PO Q6H [facility]


   Last Admin: 10/30/17 20:48 Dose:  Not Given


Metoclopramide HCl (Reglan)  5 mg IVP ACHS [facility]


   Last Admin: 10/30/17 16:45 Dose:  5 mg


Ondansetron HCl (Zofran Inj)  4 mg IVP Q4 PRN


   PRN Reason: Nausea/Vomiting


Pantoprazole Sodium (Protonix Inj)  40 mg IVP DAILY [facility]


   Last Admin: 10/30/17 09:35 Dose:  40 mg


Potassium Chloride (Potassium Chloride Oral Soln)  20 meq PO BID [facility]


   Last Admin: 10/30/17 18:53 Dose:  20 meq


Potassium Phos/Sodium Phos (Neutra-Phos)  1 pkt PO TID [facility]


   Last Admin: 10/30/17 18:50 Dose:  1 pkt











- Labs


Labs: 


 





 10/30/17 06:58 





 10/30/17 06:58 





 











PT  15.5 SECONDS (9.7-12.2)  H  10/24/17  03:58    


 


INR  1.4   10/24/17  03:58    


 


APTT  28 SECONDS (21-34)   10/24/17  03:58    














- Constitutional


Appears: No Acute Distress





- Head Exam


Head Exam: NORMAL INSPECTION





- Eye Exam


Eye Exam: Normal appearance


Pupil Exam: NORMAL ACCOMODATION





- ENT Exam


ENT Exam: Normal Exam





- Neck Exam


Neck Exam: Normal Inspection





- Respiratory Exam


Respiratory Exam: NORMAL BREATHING PATTERN





- Cardiovascular Exam


Cardiovascular Exam: REGULAR RHYTHM


Additional comments: 





sometimes tachycardic





- GI/Abdominal Exam


GI & Abdominal Exam: Hypoactive Bowel Sounds





- Rectal Exam


Rectal Exam: Deferred





- Neurological Exam


Neurological Exam: Awake, Motor Sensory Deficit


Neuro motor strength exam: Right Upper Extremity: 2/1, Right Lower Extremity: 2/

1





- Psychiatric Exam


Psychiatric exam: Flat Affect





- Skin


Skin Exam: Normal Color


Additional comments: 





+ decubiti at sacral and at PSIS





Assessment and Plan


(1) Endometritis


Assessment & Plan: 


\


s/p hysterectomy, on IV abx


Status: Acute   





(2) Fecal impaction


Assessment & Plan: 


started on promotility agent


Status: Acute   





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


Assessment & Plan: 


no new neuro deficits


Status: Chronic   





(4) Diabetes mellitus


Assessment & Plan: 


sugars stable


Status: Chronic   





(5) Malnutrition following gastrointestinal surgery


Assessment & Plan: 


stat calorie count; check prealbumin in AM


Status: Acute

## 2017-10-30 NOTE — CP.PCM.PN
Subjective





- Date & Time of Evaluation


Date of Evaluation: 10/30/17


Time of Evaluation: 10:00





- Subjective


Subjective: 





mrsa neg


vancio held


cont merrem





Objective





- Vital Signs/Intake and Output


Vital Signs (last 24 hours): 


 











Temp Pulse Resp BP Pulse Ox


 


 97 F L  100 H  20   146/84   96 


 


 10/30/17 08:23  10/30/17 08:23  10/30/17 08:23  10/30/17 08:23  10/30/17 08:23








Intake and Output: 


 











 10/30/17 10/30/17





 06:59 18:59


 


Intake Total 600 


 


Output Total 1070 


 


Balance -470 














- Medications


Medications: 


 Current Medications





Acetaminophen (Tylenol 325mg Tab)  325 mg PO Q4 PRN


   PRN Reason: Fever >100.4 F


Albuterol Sulfate (Albuterol 0.083% Inhal Sol (2.5 Mg/3 Ml) Ud)  2.5 mg IH RQ4 

MANDIE


   Last Admin: 10/30/17 07:52 Dose:  2.5 mg


Heparin Sodium (Porcine) (Heparin)  5,000 units SC Q12 [facility]


   Last Admin: 10/30/17 09:35 Dose:  5,000 units


Hydromorphone HCl (Dilaudid)  0.5 mg IVP Q4H PRN


   PRN Reason: Pain, moderate (4-7)


   Last Admin: 10/27/17 13:55 Dose:  0.5 mg


BUPIVACAINE 0.125%/0.9% NACL (Bupivacaine-Ns 0.125% On-Q )  600 mls @ 4 mls/

hr IJ ONCE ONE


   Stop: 11/01/17 18:14


   Last Admin: 10/26/17 14:50 Dose:  0 mls


Lactated Ringer's (Lactated Ringer's)  1,000 mls @ 50 mls/hr IV .Q20H [facility]


   Last Admin: 10/30/17 09:33 Dose:  Not Given


Meropenem 500 mg/ Sodium (Chloride)  100 mls @ 100 mls/hr IVPB Q8H [facility]


   Last Admin: 10/30/17 09:34 Dose:  100 mls/hr


Potassium Chloride 20 meq/ (Sodium Chloride)  110 mls @ 50 mls/hr IV Q2H [facility]


   Stop: 10/30/17 15:59


   Last Admin: 10/30/17 11:08 Dose:  50 mls/hr


Magnesium Sulfate/Dextrose (Magnesium Sulfate 1 Gm/100 Ml D5w)  1 gm in 100 mls 

@ 300 mls/hr IVPB Q30M [facility]


   Stop: 10/30/17 11:34


Insulin Human Regular (Novolin R)  0 unit SC ACHS [facility]


   PRN Reason: Protocol


   Last Admin: 10/30/17 08:30 Dose:  Not Given


Lactulose (Enulose)  20 gm PO Q6H [facility]


   Last Admin: 10/30/17 01:20 Dose:  20 gm


Metoclopramide HCl (Reglan)  5 mg IVP ACHS [facility]


   Last Admin: 10/30/17 08:46 Dose:  5 mg


Ondansetron HCl (Zofran Inj)  4 mg IVP Q4 PRN


   PRN Reason: Nausea/Vomiting


Pantoprazole Sodium (Protonix Inj)  40 mg IVP DAILY [facility]


   Last Admin: 10/30/17 09:35 Dose:  40 mg


Potassium Chloride (Potassium Chloride Oral Soln)  20 meq PO BID [facility]


   Last Admin: 10/30/17 09:38 Dose:  20 meq


Potassium Phos/Sodium Phos (Neutra-Phos)  1 pkt PO TID [facility]


   Last Admin: 10/30/17 09:34 Dose:  1 pkt











- Labs


Labs: 


 





 10/30/17 06:58 





 10/30/17 06:58 





 











PT  15.5 SECONDS (9.7-12.2)  H  10/24/17  03:58    


 


INR  1.4   10/24/17  03:58    


 


APTT  28 SECONDS (21-34)   10/24/17  03:58    














- Constitutional


Appears: Non-toxic





- Head Exam


Head Exam: NORMOCEPHALIC





- Eye Exam


Eye Exam: absent: Scleral icterus





- ENT Exam


ENT Exam: Mucous Membranes Dry





- Neck Exam


Neck Exam: absent: Lymphadenopathy





- Respiratory Exam


Respiratory Exam: Decreased Breath Sounds





- Cardiovascular Exam


Cardiovascular Exam: REGULAR RHYTHM





- GI/Abdominal Exam


GI & Abdominal Exam: Distended, Soft





- Rectal Exam


Rectal Exam: Deferred





-  Exam


 Exam: NORMAL INSPECTION





Assessment and Plan


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


Status: Acute   





(2) Diabetes mellitus


Status: Chronic   





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


Status: Chronic

## 2017-10-31 LAB
BASOPHILS # BLD AUTO: 0 K/UL (ref 0–0.2)
BASOPHILS NFR BLD: 0.3 % (ref 0–2)
BUN SERPL-MCNC: 11 MG/DL (ref 7–17)
CALCIUM SERPL-MCNC: 7.6 MG/DL (ref 8.6–10.4)
CEA SERPL-MCNC: 5.7 NG/ML (ref 0–3)
CHLORIDE SERPL-SCNC: 104 MMOL/L (ref 98–107)
CO2 SERPL-SCNC: 26 MMOL/L (ref 22–30)
EOSINOPHIL # BLD AUTO: 0.1 K/UL (ref 0–0.7)
EOSINOPHIL NFR BLD: 1 % (ref 0–4)
ERYTHROCYTE [DISTWIDTH] IN BLOOD BY AUTOMATED COUNT: 14.1 % (ref 11.5–14.5)
GLUCOSE SERPL-MCNC: 118 MG/DL (ref 65–105)
HCT VFR BLD CALC: 30.9 % (ref 34–47)
LYMPHOCYTES # BLD AUTO: 0.8 K/UL (ref 1–4.3)
LYMPHOCYTES NFR BLD AUTO: 9.8 % (ref 20–40)
MAGNESIUM SERPL-MCNC: 1.5 MG/DL (ref 1.6–2.3)
MCH RBC QN AUTO: 29.9 PG (ref 27–31)
MCHC RBC AUTO-ENTMCNC: 33.6 G/DL (ref 33–37)
MCV RBC AUTO: 89 FL (ref 81–99)
MONOCYTES # BLD: 0.5 K/UL (ref 0–0.8)
MONOCYTES NFR BLD: 5.3 % (ref 0–10)
NEUTROPHILS NFR BLD AUTO: 86 % (ref 50–75)
NRBC BLD AUTO-RTO: 0 % (ref 0–2)
PHOSPHATE SERPL-MCNC: 2 MG/DL (ref 2.5–4.5)
PLATELET # BLD: 271 K/UL (ref 130–400)
PMV BLD AUTO: 8.1 FL (ref 7.2–11.7)
POTASSIUM SERPL-SCNC: 3.3 MMOL/L (ref 3.6–5.2)
SODIUM SERPL-SCNC: 138 MMOL/L (ref 132–148)
TOTAL CELLS COUNTED BLD: 100
VARIANT LYMPHS NFR BLD MANUAL: 1 % (ref 0–0)
WBC # BLD AUTO: 8.6 K/UL (ref 4.8–10.8)

## 2017-10-31 RX ADMIN — ALBUTEROL SULFATE SCH MG: 2.5 SOLUTION RESPIRATORY (INHALATION) at 11:22

## 2017-10-31 RX ADMIN — SODIUM CHLORIDE SCH MLS/HR: 0.9 INJECTION, SOLUTION INTRAVENOUS at 08:12

## 2017-10-31 RX ADMIN — MAGNESIUM SULFATE IN DEXTROSE SCH: 10 INJECTION, SOLUTION INTRAVENOUS at 12:53

## 2017-10-31 RX ADMIN — ALBUTEROL SULFATE SCH: 2.5 SOLUTION RESPIRATORY (INHALATION) at 23:52

## 2017-10-31 RX ADMIN — SODIUM CHLORIDE SCH MLS/HR: 0.9 INJECTION, SOLUTION INTRAVENOUS at 16:49

## 2017-10-31 RX ADMIN — ALBUTEROL SULFATE SCH MG: 2.5 SOLUTION RESPIRATORY (INHALATION) at 20:05

## 2017-10-31 RX ADMIN — CALCIUM CARBONATE-VITAMIN D TAB 500 MG-200 UNIT SCH TAB: 500-200 TAB at 18:15

## 2017-10-31 RX ADMIN — HYDROMORPHONE HYDROCHLORIDE PRN MG: 1 INJECTION, SOLUTION INTRAMUSCULAR; INTRAVENOUS; SUBCUTANEOUS at 00:29

## 2017-10-31 RX ADMIN — POTASSIUM CHLORIDE SCH MEQ: 1.5 SOLUTION ORAL at 09:48

## 2017-10-31 RX ADMIN — HUMAN INSULIN SCH: 100 INJECTION, SOLUTION SUBCUTANEOUS at 18:12

## 2017-10-31 RX ADMIN — HYDROMORPHONE HYDROCHLORIDE PRN MG: 1 INJECTION, SOLUTION INTRAMUSCULAR; INTRAVENOUS; SUBCUTANEOUS at 16:50

## 2017-10-31 RX ADMIN — POTASSIUM CHLORIDE SCH MEQ: 1.5 SOLUTION ORAL at 18:35

## 2017-10-31 RX ADMIN — CALCIUM CARBONATE-VITAMIN D TAB 500 MG-200 UNIT SCH TAB: 500-200 TAB at 09:49

## 2017-10-31 RX ADMIN — SODIUM CHLORIDE SCH MLS/HR: 0.9 INJECTION, SOLUTION INTRAVENOUS at 00:27

## 2017-10-31 RX ADMIN — ALBUTEROL SULFATE SCH MG: 2.5 SOLUTION RESPIRATORY (INHALATION) at 16:26

## 2017-10-31 RX ADMIN — HUMAN INSULIN SCH: 100 INJECTION, SOLUTION SUBCUTANEOUS at 08:11

## 2017-10-31 RX ADMIN — CALCIUM CARBONATE-VITAMIN D TAB 500 MG-200 UNIT SCH TAB: 500-200 TAB at 14:40

## 2017-10-31 RX ADMIN — HUMAN INSULIN SCH: 100 INJECTION, SOLUTION SUBCUTANEOUS at 21:40

## 2017-10-31 RX ADMIN — HYDROMORPHONE HYDROCHLORIDE PRN MG: 1 INJECTION, SOLUTION INTRAMUSCULAR; INTRAVENOUS; SUBCUTANEOUS at 11:54

## 2017-10-31 RX ADMIN — ALBUTEROL SULFATE SCH MG: 2.5 SOLUTION RESPIRATORY (INHALATION) at 07:35

## 2017-10-31 RX ADMIN — ALBUTEROL SULFATE SCH: 2.5 SOLUTION RESPIRATORY (INHALATION) at 03:14

## 2017-10-31 RX ADMIN — HUMAN INSULIN SCH UNIT: 100 INJECTION, SOLUTION SUBCUTANEOUS at 12:20

## 2017-10-31 RX ADMIN — MAGNESIUM SULFATE IN DEXTROSE SCH MLS/HR: 10 INJECTION, SOLUTION INTRAVENOUS at 12:53

## 2017-10-31 RX ADMIN — POTASSIUM & SODIUM PHOSPHATES POWDER PACK 280-160-250 MG SCH PKT: 280-160-250 PACK at 09:49

## 2017-10-31 NOTE — CP.PCM.PN
Subjective





- Date & Time of Evaluation


Date of Evaluation: 10/31/17


Time of Evaluation: 10:00





- Subjective


Subjective: 





improving


iv rx in progress 





Objective





- Vital Signs/Intake and Output


Vital Signs (last 24 hours): 


 











Temp Pulse Resp BP Pulse Ox


 


 98.7 F   100 H  20   148/85   98 


 


 10/31/17 09:00  10/31/17 09:00  10/31/17 09:00  10/31/17 09:00  10/31/17 09:00








Intake and Output: 


 











 10/31/17 10/31/17





 06:59 18:59


 


Intake Total 820 


 


Output Total 745 


 


Balance 75 














- Medications


Medications: 


 Current Medications





Acetaminophen (Tylenol 325mg Tab)  325 mg PO Q4 PRN


   PRN Reason: Fever >100.4 F


Albuterol Sulfate (Albuterol 0.083% Inhal Sol (2.5 Mg/3 Ml) Ud)  2.5 mg IH RQ4 

[facility]


   Last Admin: 10/31/17 07:35 Dose:  2.5 mg


Calcium/Vitamin D (Oyster Shell Calcium/Vitamin D 500 Mg-200 Iu)  1 tab PO TID 

[facility]


   Last Admin: 10/31/17 09:49 Dose:  1 tab


Heparin Sodium (Porcine) (Heparin)  5,000 units SC Q12 [facility]


   Last Admin: 10/31/17 09:48 Dose:  5,000 units


Hydromorphone HCl (Dilaudid)  0.5 mg IVP Q4H PRN


   PRN Reason: Pain, moderate (4-7)


   Last Admin: 10/31/17 00:29 Dose:  0.5 mg


BUPIVACAINE 0.125%/0.9% NACL (Bupivacaine-Ns 0.125% On-Q )  600 mls @ 4 mls/

hr IJ ONCE ONE


   Stop: 11/01/17 18:14


   Last Admin: 10/26/17 14:50 Dose:  0 mls


Lactated Ringer's (Lactated Ringer's)  1,000 mls @ 50 mls/hr IV .Q20H [facility]


   Last Admin: 10/31/17 04:50 Dose:  Not Given


Meropenem 500 mg/ Dextrose  100 mls @ 100 mls/hr IVPB Q8H [facility]


   Last Admin: 10/31/17 08:12 Dose:  100 mls/hr


Insulin Human Regular (Novolin R)  0 unit SC ACHS [facility]


   PRN Reason: Protocol


   Last Admin: 10/31/17 08:11 Dose:  Not Given


Lactulose (Enulose)  20 gm PO Q6H [facility]


   Last Admin: 10/31/17 08:12 Dose:  20 gm


Metoclopramide HCl (Reglan)  5 mg IVP BID [facility]


   Last Admin: 10/31/17 09:48 Dose:  5 mg


Ondansetron HCl (Zofran Inj)  4 mg IVP Q4 PRN


   PRN Reason: Nausea/Vomiting


Pantoprazole Sodium (Protonix Inj)  40 mg IVP DAILY [facility]


   Last Admin: 10/31/17 09:49 Dose:  40 mg


Potassium Chloride (Potassium Chloride Oral Soln)  20 meq PO BID [facility]


   Last Admin: 10/31/17 09:48 Dose:  20 meq


Potassium Phos/Sodium Phos (Neutra-Phos)  1 pkt PO TID [facility]


   Last Admin: 10/31/17 09:49 Dose:  1 pkt











- Labs


Labs: 


 





 10/31/17 07:48 





 10/31/17 07:48 





 











PT  15.5 SECONDS (9.7-12.2)  H  10/24/17  03:58    


 


INR  1.4   10/24/17  03:58    


 


APTT  28 SECONDS (21-34)   10/24/17  03:58    














- Constitutional


Appears: Non-toxic, Chronically Ill





- Head Exam


Head Exam: NORMOCEPHALIC





- Eye Exam


Eye Exam: PERRL





- ENT Exam


ENT Exam: Mucous Membranes Dry





- Neck Exam


Neck Exam: absent: Lymphadenopathy





- Respiratory Exam


Respiratory Exam: Decreased Breath Sounds





- Cardiovascular Exam


Cardiovascular Exam: REGULAR RHYTHM





Assessment and Plan


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


Status: Acute   





(2) Diabetes mellitus


Status: Chronic   





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


Status: Chronic

## 2017-10-31 NOTE — RAD
HISTORY:

venous congestion, monitor status  



COMPARISON:

No prior. 



FINDINGS:



LUNGS:

There is no pulmonary infiltrate.  Minimal linear scar/atelectasis at 

left lung base. Examination limited due to oblique positioning.  

Curvilinear calcification in right lung apex as on prior examinations,

 of uncertain significance.



PLEURA:

No pleural effusion or pneumothorax.



CARDIOVASCULAR:

Normal heart size. No pulmonary venous congestion evident.



OSSEOUS STRUCTURES:

No significant abnormalities.



VISUALIZED UPPER ABDOMEN:

Normal.



OTHER FINDINGS:

None.



IMPRESSION:

No acute infiltrate.

## 2017-10-31 NOTE — CP.PCM.PN
Subjective





- Date & Time of Evaluation


Date of Evaluation: 10/31/17


Time of Evaluation: 11:45





- Subjective


Subjective: 


Pt's WBC count back to normal, although % neutrophils remain quite elevated. 

Bandemia resolved and none found. 


Pt's electrolytes are another story though: K Low, Ca very low, Mg and Phos 

low. PICC line placement ordered. Assume 6 weeks IV abx for pt?


> Calorie count started bec unknown if pt able to tolerate significant po with 

the higher metabolic demand necessary for post-op patients. If appears to be 

increasing, will terminate early, but will need to be able to feed herself at 

the same time. Toileting is paramount need for patient to do at this time as 

well. 








Objective





- Vital Signs/Intake and Output


Vital Signs (last 24 hours): 


 











Temp Pulse Resp BP Pulse Ox


 


 98.7 F   100 H  20   148/85   98 


 


 10/31/17 09:00  10/31/17 09:00  10/31/17 09:00  10/31/17 09:00  10/31/17 09:00








Intake and Output: 


 











 10/31/17 10/31/17





 06:59 18:59


 


Intake Total 820 


 


Output Total 745 


 


Balance 75 














- Medications


Medications: 


 Current Medications





Acetaminophen (Tylenol 325mg Tab)  325 mg PO Q4 PRN


   PRN Reason: Fever >100.4 F


Albuterol Sulfate (Albuterol 0.083% Inhal Sol (2.5 Mg/3 Ml) Ud)  2.5 mg IH RQ4 

[facility]


   Last Admin: 10/31/17 11:22 Dose:  2.5 mg


Calcium/Vitamin D (Oyster Shell Calcium/Vitamin D 500 Mg-200 Iu)  1 tab PO TID 

[facility]


   Last Admin: 10/31/17 09:49 Dose:  1 tab


Furosemide (Lasix)  20 mg IVP DAILY [facility]


Heparin Sodium (Porcine) (Heparin)  5,000 units SC Q12 [facility]


   Last Admin: 10/31/17 09:48 Dose:  5,000 units


Hydromorphone HCl (Dilaudid)  0.5 mg IVP Q4H PRN


   PRN Reason: Pain, moderate (4-7)


   Last Admin: 10/31/17 00:29 Dose:  0.5 mg


BUPIVACAINE 0.125%/0.9% NACL (Bupivacaine-Ns 0.125% On-Q )  600 mls @ 4 mls/

hr IJ ONCE ONE


   Stop: 11/01/17 18:14


   Last Admin: 10/26/17 14:50 Dose:  0 mls


Meropenem 500 mg/ Dextrose  100 mls @ 100 mls/hr IVPB Q8H [facility]


   Last Admin: 10/31/17 08:12 Dose:  100 mls/hr


Magnesium Sulfate/Dextrose (Magnesium Sulfate 1 Gm/100 Ml D5w)  1 gm in 100 mls 

@ 300 mls/hr IVPB Q30M [facility]


   Stop: 10/31/17 12:19


Calcium Gluconate 4,000 mg/ (Sodium Chloride)  290 mls @ 100 mls/hr IVPB ONCE 

ONE


   Stop: 10/31/17 14:29


Lactated Ringer's (Lactated Ringer's)  1,000 mls @ 20 mls/hr IV .Q24H [facility]


Insulin Human Regular (Novolin R)  0 unit SC ACHS [facility]


   PRN Reason: Protocol


   Last Admin: 10/31/17 08:11 Dose:  Not Given


Lactulose (Enulose)  20 gm PO Q6H [facility]


   Last Admin: 10/31/17 08:12 Dose:  20 gm


Metoclopramide HCl (Reglan)  5 mg IVP BID [facility]


   Last Admin: 10/31/17 09:48 Dose:  5 mg


Ondansetron HCl (Zofran Inj)  4 mg IVP Q4 PRN


   PRN Reason: Nausea/Vomiting


Pantoprazole Sodium (Protonix Inj)  40 mg IVP DAILY [facility]


   Last Admin: 10/31/17 09:49 Dose:  40 mg


Potassium Chloride (Potassium Chloride Oral Soln)  20 meq PO BID [facility]


   Last Admin: 10/31/17 09:48 Dose:  20 meq


Potassium Phosphate (Potassium Phosphate)  9 mmole 0.16 mmole/kg (9 mmole) IV 

ONCE ONE


   Stop: 10/31/17 11:35











- Labs


Labs: 


 





 10/31/17 07:48 





 10/31/17 07:48 





 











PT  15.5 SECONDS (9.7-12.2)  H  10/24/17  03:58    


 


INR  1.4   10/24/17  03:58    


 


APTT  28 SECONDS (21-34)   10/24/17  03:58    














- Constitutional


Appears: No Acute Distress





- Head Exam


Head Exam: NORMAL INSPECTION, NORMOCEPHALIC





- Eye Exam


Eye Exam: Normal appearance


Pupil Exam: NORMAL ACCOMODATION, PERRL





- ENT Exam


ENT Exam: Mucous Membranes Moist





- Neck Exam


Neck Exam: Normal Inspection





- Respiratory Exam


Respiratory Exam: Clear to Ausculation Bilateral, NORMAL BREATHING PATTERN





- Cardiovascular Exam


Cardiovascular Exam: REGULAR RHYTHM





- GI/Abdominal Exam


GI & Abdominal Exam: Soft, Normal Bowel Sounds





- Rectal Exam


Rectal Exam: Deferred





- Extremities Exam


Extremities Exam: Full ROM, Normal Capillary Refill, Normal Inspection





- Back Exam


Back Exam: NORMAL INSPECTION





- Neurological Exam


Neuro motor strength exam: Right Upper Extremity: 2/1, Right Lower Extremity: 4





- Psychiatric Exam


Psychiatric exam: Flat Affect





- Skin


Skin Exam: Dry, Intact, Normal Color, Pallor





Assessment and Plan


(1) Endometritis


Assessment & Plan: 


improved, resolving 


Status: Acute   





(2) Fecal impaction


Status: Acute   





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


Status: Chronic   





(4) Diabetes mellitus


Status: Chronic   





(5) Malnutrition following gastrointestinal surgery


Status: Acute   





(6) Bacteremia


Status: Acute

## 2017-10-31 NOTE — CP.PCM.PN
Subjective





- Date & Time of Evaluation


Date of Evaluation: 10/31/17


Time of Evaluation: 07:15





- Subjective


Subjective: 


General Surgery


Dr. Garcia





Pt S&E @bedside. NAEO. pt indicates lower abd when questions about pain. pt 

tolerating diet.





Objective





- Vital Signs/Intake and Output


Vital Signs (last 24 hours): 


 











Temp Pulse Resp BP Pulse Ox


 


 98.3 F   107 H  20   162/91 H  99 


 


 10/30/17 17:42  10/30/17 17:42  10/30/17 17:42  10/30/17 17:42  10/30/17 17:42








Intake and Output: 


 











 10/31/17 10/31/17





 06:59 18:59


 


Intake Total 820 


 


Output Total 745 


 


Balance 75 








 Selected Entries











  10/30/17 10/30/17 10/31/17





  15:30 23:00 06:52


 


Output, 60 10 20





Drainage Amount   





[Abdomen]   














- Medications


Medications: 


 Current Medications





Acetaminophen (Tylenol 325mg Tab)  325 mg PO Q4 PRN


   PRN Reason: Fever >100.4 F


Albuterol Sulfate (Albuterol 0.083% Inhal Sol (2.5 Mg/3 Ml) Ud)  2.5 mg IH RQ4 

[facility]


   Last Admin: 10/31/17 07:35 Dose:  2.5 mg


Calcium/Vitamin D (Oyster Shell Calcium/Vitamin D 500 Mg-200 Iu)  1 tab PO TID 

[facility]


Heparin Sodium (Porcine) (Heparin)  5,000 units SC Q12 [facility]


   Last Admin: 10/30/17 22:53 Dose:  5,000 units


Hydromorphone HCl (Dilaudid)  0.5 mg IVP Q4H PRN


   PRN Reason: Pain, moderate (4-7)


   Last Admin: 10/31/17 00:29 Dose:  0.5 mg


BUPIVACAINE 0.125%/0.9% NACL (Bupivacaine-Ns 0.125% On-Q )  600 mls @ 4 mls/

hr IJ ONCE ONE


   Stop: 11/01/17 18:14


   Last Admin: 10/26/17 14:50 Dose:  0 mls


Lactated Ringer's (Lactated Ringer's)  1,000 mls @ 50 mls/hr IV .Q20H [facility]


   Last Admin: 10/31/17 04:50 Dose:  Not Given


Meropenem 500 mg/ Dextrose  100 mls @ 100 mls/hr IVPB Q8H [facility]


   Last Admin: 10/31/17 08:12 Dose:  100 mls/hr


Insulin Human Regular (Novolin R)  0 unit SC ACHS [facility]


   PRN Reason: Protocol


   Last Admin: 10/31/17 08:11 Dose:  Not Given


Lactulose (Enulose)  20 gm PO Q6H [facility]


   Last Admin: 10/31/17 08:12 Dose:  20 gm


Metoclopramide HCl (Reglan)  5 mg IVP BID [facility]


Ondansetron HCl (Zofran Inj)  4 mg IVP Q4 PRN


   PRN Reason: Nausea/Vomiting


Pantoprazole Sodium (Protonix Inj)  40 mg IVP DAILY [facility]


   Last Admin: 10/30/17 09:35 Dose:  40 mg


Potassium Chloride (Potassium Chloride Oral Soln)  20 meq PO BID [facility]


   Last Admin: 10/30/17 18:53 Dose:  20 meq


Potassium Phos/Sodium Phos (Neutra-Phos)  1 pkt PO TID [facility]


   Last Admin: 10/30/17 18:50 Dose:  1 pkt











- Labs


Labs: 


 





 10/31/17 07:48 





 10/31/17 07:48 





 











PT  15.5 SECONDS (9.7-12.2)  H  10/24/17  03:58    


 


INR  1.4   10/24/17  03:58    


 


APTT  28 SECONDS (21-34)   10/24/17  03:58    














- Constitutional


Appears: Non-toxic, No Acute Distress





- Head Exam


Head Exam: NORMAL INSPECTION





- Eye Exam


Eye Exam: Normal appearance





- ENT Exam


ENT Exam: Mucous Membranes Moist





- Respiratory Exam


Respiratory Exam: NORMAL BREATHING PATTERN.  absent: Accessory Muscle Use, 

Respiratory Distress





- Cardiovascular Exam


Cardiovascular Exam: absent: Bradycardia, Tachycardia





- GI/Abdominal Exam


GI & Abdominal Exam: Soft, Tenderness (amanda-incisional TTP).  absent: Distended

, Guarding, Rebound


Additional comments: 





incision c/d/i


drain w/ serosanguinous output





- Extremities Exam


Extremities Exam: Normal Inspection





- Neurological Exam


Neurological Exam: Alert, Awake, Oriented x3





- Psychiatric Exam


Psychiatric exam: Normal Affect, Normal Mood





- Skin


Skin Exam: Dry, Normal Color, Warm





Assessment and Plan





- Assessment and Plan (Free Text)


Assessment: 





77 y/o F w/ uterine infection, L adenexal mass POD#5 s/p KASSY BSO





- monitor drain output


- spring to stay for 1wk minimum


- cont abx per ID


- replete electrolytes PRN


- cont diet


- encourage OOB to chair/IS use


- GI/DVT PPx





Pt discussed w/ Dr. Radha Sr DO PGY2

## 2017-11-01 LAB
BASOPHILS # BLD AUTO: 0 K/UL (ref 0–0.2)
BASOPHILS NFR BLD: 0.2 % (ref 0–2)
BUN SERPL-MCNC: 10 MG/DL (ref 7–17)
CALCIUM SERPL-MCNC: 7.1 MG/DL (ref 8.6–10.4)
CHLORIDE SERPL-SCNC: 101 MMOL/L (ref 98–107)
CO2 SERPL-SCNC: 28 MMOL/L (ref 22–30)
EOSINOPHIL # BLD AUTO: 0 K/UL (ref 0–0.7)
EOSINOPHIL NFR BLD: 0.4 % (ref 0–4)
EOSINOPHIL NFR BLD: 1 % (ref 0–4)
ERYTHROCYTE [DISTWIDTH] IN BLOOD BY AUTOMATED COUNT: 13.9 % (ref 11.5–14.5)
GLUCOSE SERPL-MCNC: 107 MG/DL (ref 65–105)
HCT VFR BLD CALC: 30.4 % (ref 34–47)
LYMPHOCYTES # BLD AUTO: 0.9 K/UL (ref 1–4.3)
LYMPHOCYTES NFR BLD AUTO: 7.7 % (ref 20–40)
MAGNESIUM SERPL-MCNC: 1.6 MG/DL (ref 1.6–2.3)
MCH RBC QN AUTO: 29.2 PG (ref 27–31)
MCHC RBC AUTO-ENTMCNC: 33.1 G/DL (ref 33–37)
MCV RBC AUTO: 88.4 FL (ref 81–99)
MONOCYTES # BLD: 0.4 K/UL (ref 0–0.8)
MONOCYTES NFR BLD: 4 % (ref 0–10)
NEUTROPHILS NFR BLD AUTO: 88 % (ref 50–75)
NRBC BLD AUTO-RTO: 0 % (ref 0–2)
PHOSPHATE SERPL-MCNC: 3 MG/DL (ref 2.5–4.5)
PLATELET # BLD: 268 K/UL (ref 130–400)
PMV BLD AUTO: 8.8 FL (ref 7.2–11.7)
POTASSIUM SERPL-SCNC: 3.3 MMOL/L (ref 3.6–5.2)
SODIUM SERPL-SCNC: 135 MMOL/L (ref 132–148)
TOTAL CELLS COUNTED BLD: 100
WBC # BLD AUTO: 11 K/UL (ref 4.8–10.8)

## 2017-11-01 PROCEDURE — 02HV33Z INSERTION OF INFUSION DEVICE INTO SUPERIOR VENA CAVA, PERCUTANEOUS APPROACH: ICD-10-PCS | Performed by: FAMILY MEDICINE

## 2017-11-01 RX ADMIN — HUMAN INSULIN SCH: 100 INJECTION, SOLUTION SUBCUTANEOUS at 16:48

## 2017-11-01 RX ADMIN — ALBUTEROL SULFATE SCH MG: 2.5 SOLUTION RESPIRATORY (INHALATION) at 07:16

## 2017-11-01 RX ADMIN — CALCIUM CARBONATE-VITAMIN D TAB 500 MG-200 UNIT SCH TAB: 500-200 TAB at 14:30

## 2017-11-01 RX ADMIN — POTASSIUM CHLORIDE SCH: 1.5 SOLUTION ORAL at 17:56

## 2017-11-01 RX ADMIN — CALCIUM CARBONATE-VITAMIN D TAB 500 MG-200 UNIT SCH TAB: 500-200 TAB at 11:00

## 2017-11-01 RX ADMIN — HUMAN INSULIN SCH: 100 INJECTION, SOLUTION SUBCUTANEOUS at 22:15

## 2017-11-01 RX ADMIN — HYDROMORPHONE HYDROCHLORIDE PRN MG: 1 INJECTION, SOLUTION INTRAMUSCULAR; INTRAVENOUS; SUBCUTANEOUS at 11:56

## 2017-11-01 RX ADMIN — CALCIUM CARBONATE-VITAMIN D TAB 500 MG-200 UNIT SCH TAB: 500-200 TAB at 17:49

## 2017-11-01 RX ADMIN — ALBUTEROL SULFATE SCH MG: 2.5 SOLUTION RESPIRATORY (INHALATION) at 11:21

## 2017-11-01 RX ADMIN — ALBUTEROL SULFATE SCH MG: 2.5 SOLUTION RESPIRATORY (INHALATION) at 16:45

## 2017-11-01 RX ADMIN — HYDROMORPHONE HYDROCHLORIDE PRN MG: 1 INJECTION, SOLUTION INTRAMUSCULAR; INTRAVENOUS; SUBCUTANEOUS at 18:00

## 2017-11-01 RX ADMIN — CALCIUM CARBONATE-VITAMIN D TAB 500 MG-200 UNIT SCH: 500-200 TAB at 17:56

## 2017-11-01 RX ADMIN — SODIUM CHLORIDE SCH MLS/HR: 0.9 INJECTION, SOLUTION INTRAVENOUS at 16:46

## 2017-11-01 RX ADMIN — SODIUM CHLORIDE SCH MLS/HR: 0.9 INJECTION, SOLUTION INTRAVENOUS at 00:30

## 2017-11-01 RX ADMIN — POTASSIUM CHLORIDE SCH MEQ: 1.5 SOLUTION ORAL at 17:48

## 2017-11-01 RX ADMIN — HUMAN INSULIN SCH: 100 INJECTION, SOLUTION SUBCUTANEOUS at 08:30

## 2017-11-01 RX ADMIN — SODIUM CHLORIDE SCH MLS/HR: 0.9 INJECTION, SOLUTION INTRAVENOUS at 09:01

## 2017-11-01 RX ADMIN — ALBUTEROL SULFATE SCH MG: 2.5 SOLUTION RESPIRATORY (INHALATION) at 19:41

## 2017-11-01 RX ADMIN — HUMAN INSULIN SCH: 100 INJECTION, SOLUTION SUBCUTANEOUS at 12:00

## 2017-11-01 RX ADMIN — POTASSIUM CHLORIDE SCH MEQ: 1.5 SOLUTION ORAL at 11:00

## 2017-11-01 RX ADMIN — ALBUTEROL SULFATE SCH: 2.5 SOLUTION RESPIRATORY (INHALATION) at 03:06

## 2017-11-01 NOTE — CP.PCM.PN
Subjective





- Date & Time of Evaluation


Date of Evaluation: 11/01/17


Time of Evaluation: 21:28





- Subjective


Subjective: 


> pt's ability and desire to eat still a problem. Pt on 2nd day of calorie count

; yesterday's calorie count==>0 (zero). Appetite may be getting affected by her 

antibiotic as well. Pt on PPI for about a week now. Will start on mirtazapine 

for appetite stimulation. Pt also needs to be discontinued from her pain 

medication to something less strong as it hampers intestinal motility. 


> PICC line put in today in preparation for continued IV abx. There are still 

many issues that are being dealt with on multiple levels which, if not addressed

, will result in the patient bouncing back to the hospital. I saw that SW is 

making plans for patient discharge today, and pt was supposed to be picked up 

today. I had NO INTENTION OF DISCHARGING PATIENT TODAY bec I am still working 

on pt's nutrition and may need a PEG. 





Objective





- Vital Signs/Intake and Output


Vital Signs (last 24 hours): 


 











Temp Pulse Resp BP Pulse Ox


 


 99.0 F   104 H  20   121/76   100 


 


 11/01/17 07:59  11/01/17 07:59  11/01/17 07:59  11/01/17 17:10  11/01/17 07:59








Intake and Output: 


 











 11/01/17 11/02/17





 18:59 06:59


 


Intake Total 440 


 


Output Total 1240 


 


Balance -800 














- Medications


Medications: 


 Current Medications





Acetaminophen (Tylenol 325mg Tab)  325 mg PO Q4 PRN


   PRN Reason: Fever >100.4 F


Albuterol Sulfate (Albuterol 0.083% Inhal Sol (2.5 Mg/3 Ml) Ud)  2.5 mg IH RQ4 

[facility]


   Last Admin: 11/01/17 19:41 Dose:  2.5 mg


Calcium/Vitamin D (Oyster Shell Calcium/Vitamin D 500 Mg-200 Iu)  1 tab PO TID 

[facility]


   Last Admin: 11/01/17 17:56 Dose:  Not Given


Furosemide (Lasix)  20 mg IVP DAILY [facility]


   Last Admin: 11/01/17 11:00 Dose:  20 mg


Heparin Sodium (Porcine) (Heparin)  5,000 units SC Q12 [facility]


   Last Admin: 11/01/17 11:00 Dose:  5,000 units


Hydromorphone HCl (Dilaudid)  0.5 mg IVP Q4H [facility]


   PRN Reason: Pain, moderate (4-7)


   Last Admin: 11/01/17 18:00 Dose:  0.5 mg


Meropenem 500 mg/ Dextrose  100 mls @ 100 mls/hr IVPB Q8H [facility]


   Last Admin: 11/01/17 16:46 Dose:  100 mls/hr


Lactated Ringer's (Lactated Ringer's)  1,000 mls @ 20 mls/hr IV .Q24H [facility]


   Last Admin: 11/01/17 16:44 Dose:  20 mls/hr


Insulin Human Regular (Novolin R)  0 unit SC ACHS [facility]


   PRN Reason: Protocol


   Last Admin: 11/01/17 16:48 Dose:  Not Given


Lactulose (Enulose)  20 gm PO Q6H [facility]


   Last Admin: 11/01/17 19:04 Dose:  Not Given


Metoclopramide HCl (Reglan)  5 mg IVP BID [facility]


   Last Admin: 11/01/17 17:49 Dose:  5 mg


Ondansetron HCl (Zofran Inj)  4 mg IVP Q4 PRN


   PRN Reason: Nausea/Vomiting


Pantoprazole Sodium (Protonix Inj)  40 mg IVP DAILY [facility]


   Last Admin: 11/01/17 11:00 Dose:  40 mg


Potassium Chloride (Potassium Chloride Oral Soln)  20 meq PO BID [facility]


   Last Admin: 11/01/17 17:56 Dose:  Not Given











- Labs


Labs: 


 





 11/01/17 11:44 





 11/01/17 07:33 





 











PT  15.5 SECONDS (9.7-12.2)  H  10/24/17  03:58    


 


INR  1.4   10/24/17  03:58    


 


APTT  28 SECONDS (21-34)   10/24/17  03:58    














- Constitutional


Appears: No Acute Distress





- Head Exam


Head Exam: NORMAL INSPECTION, NORMOCEPHALIC





- Eye Exam


Eye Exam: Normal appearance


Pupil Exam: NORMAL ACCOMODATION





- ENT Exam


ENT Exam: Normal Exam





- Neck Exam


Neck Exam: Normal Inspection





- Respiratory Exam


Respiratory Exam: Clear to Ausculation Bilateral, NORMAL BREATHING PATTERN





- Cardiovascular Exam


Cardiovascular Exam: REGULAR RHYTHM





- GI/Abdominal Exam


GI & Abdominal Exam: Normal Bowel Sounds


Additional comments: 





no tenderness appreciated, pt denies pain





- Rectal Exam


Rectal Exam: Deferred





- Extremities Exam


Extremities Exam: Normal Capillary Refill





- Back Exam


Additional comments: 





+ stage II pressure at R buttocks





- Neurological Exam


Neurological Exam: Alert


Neuro motor strength exam: Left Upper Extremity: 3, Right Upper Extremity: 2/1, 

Left Lower Extremity: 3, Right Lower Extremity: 2/1


Additional comments: 





non-ambulatory





- Psychiatric Exam


Psychiatric exam: Depressed, Flat Affect





- Skin


Skin Exam: Dry, Intact, Normal Color, Warm





Assessment and Plan


(1) Endometritis


Status: Suspected   





(2) Fecal impaction


Status: Acute   





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


Assessment & Plan: 


stable


Status: Chronic   





(4) Diabetes mellitus


Status: Chronic   





(5) Malnutrition following gastrointestinal surgery


Assessment & Plan: 


re-start Glucerna if not started. 


Status: Acute   





(6) Bacteremia


Assessment & Plan: 


to complryr 6 week course s ID usually orders. Pls give supplement after meajor 

meal. 


Status: Acute

## 2017-11-01 NOTE — RAD
HISTORY:

verify right PICC  



COMPARISON:

October 31, 2017.  



FINDINGS:



LUNGS:

No active pulmonary disease.



PLEURA:

No significant pleural effusion identified, no pneumothorax apparent.



CARDIOVASCULAR:

 No radiographic findings to suggest acute or significant 

cardiovascular disease. PICC line in satisfactory position the tip is 

3 cm from the cavoatrial junction.



OSSEOUS STRUCTURES:

No significant abnormalities.



VISUALIZED UPPER ABDOMEN:

Normal.



OTHER FINDINGS:

None.



IMPRESSION:

Satisfactory position of recently placed PICC line. No pneumothorax 

or other adverse finding identified.

## 2017-11-01 NOTE — CP.PCM.PN
Subjective





- Date & Time of Evaluation


Date of Evaluation: 11/01/17


Time of Evaluation: 08:00





- Subjective


Subjective: 





improving


wbc sl higher


low grade


vanco/merrem reordered 





Objective





- Vital Signs/Intake and Output


Vital Signs (last 24 hours): 


 











Temp Pulse Resp BP Pulse Ox


 


 99.0 F   104 H  20   121/76   100 


 


 11/01/17 07:59  11/01/17 07:59  11/01/17 07:59  11/01/17 11:00  11/01/17 07:59








Intake and Output: 


 











 11/01/17 11/01/17





 06:59 18:59


 


Intake Total 850 


 


Output Total 1920 


 


Balance -1070 














- Medications


Medications: 


 Current Medications





Acetaminophen (Tylenol 325mg Tab)  325 mg PO Q4 PRN


   PRN Reason: Fever >100.4 F


Albuterol Sulfate (Albuterol 0.083% Inhal Sol (2.5 Mg/3 Ml) Ud)  2.5 mg IH RQ4 

[facility]


   Last Admin: 11/01/17 11:21 Dose:  2.5 mg


Calcium/Vitamin D (Oyster Shell Calcium/Vitamin D 500 Mg-200 Iu)  1 tab PO TID 

[facility]


   Last Admin: 11/01/17 14:30 Dose:  1 tab


Furosemide (Lasix)  20 mg IVP DAILY [facility]


   Last Admin: 11/01/17 11:00 Dose:  20 mg


Heparin Sodium (Porcine) (Heparin)  5,000 units SC Q12 [facility]


   Last Admin: 11/01/17 11:00 Dose:  5,000 units


Hydromorphone HCl (Dilaudid)  0.5 mg IVP Q4H PRN


   PRN Reason: Pain, moderate (4-7)


   Last Admin: 11/01/17 11:56 Dose:  0.5 mg


BUPIVACAINE 0.125%/0.9% NACL (Bupivacaine-Ns 0.125% On-Q )  600 mls @ 4 mls/

hr IJ ONCE ONE


   Stop: 11/01/17 18:14


   Last Admin: 10/26/17 14:50 Dose:  0 mls


Meropenem 500 mg/ Dextrose  100 mls @ 100 mls/hr IVPB Q8H [facility]


   Last Admin: 11/01/17 09:01 Dose:  100 mls/hr


Lactated Ringer's (Lactated Ringer's)  1,000 mls @ 20 mls/hr IV .Q24H [facility]


   Last Admin: 11/01/17 11:40 Dose:  Not Given


Potassium Chloride (Potassium Chloride 10 Meq/100 Ml)  10 meq in 100 mls @ 100 

mls/hr IVPB Q1H [facility]


   Stop: 11/01/17 19:59


Vancomycin HCl 1 gm/ Sodium (Chloride)  250 mls @ 166.7 mls/hr IVPB [facility]


   Stop: 11/01/17 17:32


Insulin Human Regular (Novolin R)  0 unit SC ACHS [facility]


   PRN Reason: Protocol


   Last Admin: 11/01/17 12:00 Dose:  Not Given


Lactulose (Enulose)  20 gm PO Q6H [facility]


   Last Admin: 11/01/17 14:30 Dose:  20 gm


Metoclopramide HCl (Reglan)  5 mg IVP BID [facility]


   Last Admin: 11/01/17 11:00 Dose:  5 mg


Ondansetron HCl (Zofran Inj)  4 mg IVP Q4 PRN


   PRN Reason: Nausea/Vomiting


Pantoprazole Sodium (Protonix Inj)  40 mg IVP DAILY [facility]


   Last Admin: 11/01/17 11:00 Dose:  40 mg


Potassium Chloride (Potassium Chloride Oral Soln)  20 meq PO BID [facility]


   Last Admin: 11/01/17 11:00 Dose:  20 meq











- Labs


Labs: 


 





 11/01/17 11:44 





 11/01/17 07:33 





 











PT  15.5 SECONDS (9.7-12.2)  H  10/24/17  03:58    


 


INR  1.4   10/24/17  03:58    


 


APTT  28 SECONDS (21-34)   10/24/17  03:58    














Assessment and Plan


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


Status: Acute   





(2) Diabetes mellitus


Status: Chronic   





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


Status: Chronic

## 2017-11-01 NOTE — CP.PCM.PN
Subjective





- Date & Time of Evaluation


Date of Evaluation: 11/01/17


Time of Evaluation: 10:00





- Subjective


Subjective: 


SURGERY PROGRESS NOTE FOR DR. GARNER





Patient seen and examined at bedside. Family at bedside. She still has some 

pain. She denies nausea or vomiting. She had a BM this morning. She is 

tolerating her diet. She got a PICC today.





Objective





- Vital Signs/Intake and Output


Vital Signs (last 24 hours): 


 











Temp Pulse Resp BP Pulse Ox


 


 99.0 F   104 H  20   121/76   100 


 


 11/01/17 07:59  11/01/17 07:59  11/01/17 07:59  11/01/17 11:00  11/01/17 07:59








Intake and Output: 


 











 11/01/17 11/01/17





 06:59 18:59


 


Intake Total 850 


 


Output Total 1920 


 


Balance -1070 














- Medications


Medications: 


 Current Medications





Acetaminophen (Tylenol 325mg Tab)  325 mg PO Q4 PRN


   PRN Reason: Fever >100.4 F


Albuterol Sulfate (Albuterol 0.083% Inhal Sol (2.5 Mg/3 Ml) Ud)  2.5 mg IH RQ4 

[facility]


   Last Admin: 11/01/17 16:45 Dose:  2.5 mg


Calcium/Vitamin D (Oyster Shell Calcium/Vitamin D 500 Mg-200 Iu)  1 tab PO TID 

[facility]


   Last Admin: 11/01/17 14:30 Dose:  1 tab


Furosemide (Lasix)  20 mg IVP DAILY [facility]


   Last Admin: 11/01/17 11:00 Dose:  20 mg


Heparin Sodium (Porcine) (Heparin)  5,000 units SC Q12 [facility]


   Last Admin: 11/01/17 11:00 Dose:  5,000 units


Hydromorphone HCl (Dilaudid)  0.5 mg IVP Q4H PRN


   PRN Reason: Pain, moderate (4-7)


   Last Admin: 11/01/17 11:56 Dose:  0.5 mg


BUPIVACAINE 0.125%/0.9% NACL (Bupivacaine-Ns 0.125% On-Q )  600 mls @ 4 mls/

hr IJ ONCE ONE


   Stop: 11/01/17 18:14


   Last Admin: 10/26/17 14:50 Dose:  0 mls


Meropenem 500 mg/ Dextrose  100 mls @ 100 mls/hr IVPB Q8H [facility]


   Last Admin: 11/01/17 16:46 Dose:  100 mls/hr


Lactated Ringer's (Lactated Ringer's)  1,000 mls @ 20 mls/hr IV .Q24H [facility]


   Last Admin: 11/01/17 16:44 Dose:  20 mls/hr


Potassium Chloride (Potassium Chloride 10 Meq/100 Ml)  10 meq in 100 mls @ 100 

mls/hr IVPB Q1H [facility]


   Stop: 11/01/17 19:59


   Last Admin: 11/01/17 16:47 Dose:  100 mls/hr


Vancomycin/Sodium Chloride (Vancomycin 1 Gm/Ns 200 Ml)  1 gm in 200 mls @ 133 

mls/hr IVPB ONCE ONE


   Stop: 11/01/17 18:30


Insulin Human Regular (Novolin R)  0 unit SC ACHS [facility]


   PRN Reason: Protocol


   Last Admin: 11/01/17 16:48 Dose:  Not Given


Lactulose (Enulose)  20 gm PO Q6H [facility]


   Last Admin: 11/01/17 14:30 Dose:  20 gm


Metoclopramide HCl (Reglan)  5 mg IVP BID [facility]


   Last Admin: 11/01/17 11:00 Dose:  5 mg


Ondansetron HCl (Zofran Inj)  4 mg IVP Q4 PRN


   PRN Reason: Nausea/Vomiting


Pantoprazole Sodium (Protonix Inj)  40 mg IVP DAILY [facility]


   Last Admin: 11/01/17 11:00 Dose:  40 mg


Potassium Chloride (Potassium Chloride Oral Soln)  20 meq PO BID [facility]


   Last Admin: 11/01/17 11:00 Dose:  20 meq











- Labs


Labs: 


 





 11/01/17 11:44 





 11/01/17 07:33 





 











PT  15.5 SECONDS (9.7-12.2)  H  10/24/17  03:58    


 


INR  1.4   10/24/17  03:58    


 


APTT  28 SECONDS (21-34)   10/24/17  03:58    














- Constitutional


Appears: Non-toxic, No Acute Distress





- Respiratory Exam


Respiratory Exam: NORMAL BREATHING PATTERN.  absent: Respiratory Distress





- Cardiovascular Exam


Cardiovascular Exam: Tachycardia, +S1, +S2





- GI/Abdominal Exam


GI & Abdominal Exam: Soft, Tenderness (mild tenderness around incision site).  

absent: Distended, Firm, Guarding, Rigid, Rebound


Additional comments: 


Taqueria drain in place with 60cc over past 24 hours





- Neurological Exam


Neurological Exam: Alert, Awake





Assessment and Plan





- Assessment and Plan (Free Text)


Assessment: 


77 y/o F w/ uterine infection, L adenexal mass POD#6 s/p KASSY BSO





- Taqueria drain with 60cc serous drainage over past 24 hours, possible removal of 

Taqueria drain tomorrow


- Ambrosio to stay for 1wk minimum


- Cont abx per ID


- Replete electrolytes PRN


- Encouraged OOB to chair/IS use


- GI/DVT PPx





Plan discussed w/ Dr. Radha Eaton PGY-3

## 2017-11-02 LAB
ALBUMIN/GLOB SERPL: 0.8 {RATIO} (ref 1–2.1)
ALP SERPL-CCNC: 48 U/L (ref 38–126)
ALT SERPL-CCNC: 34 U/L (ref 9–52)
APTT BLD: 50 SECONDS (ref 21–34)
AST SERPL-CCNC: 25 U/L (ref 14–36)
BASOPHILS # BLD AUTO: 0 K/UL (ref 0–0.2)
BASOPHILS # BLD AUTO: 0 K/UL (ref 0–0.2)
BASOPHILS NFR BLD: 0.1 % (ref 0–2)
BASOPHILS NFR BLD: 0.1 % (ref 0–2)
BILIRUB SERPL-MCNC: 0.7 MG/DL (ref 0.2–1.3)
BUN SERPL-MCNC: 13 MG/DL (ref 7–17)
BUN SERPL-MCNC: 13 MG/DL (ref 7–17)
CALCIUM SERPL-MCNC: 7.1 MG/DL (ref 8.6–10.4)
CALCIUM SERPL-MCNC: 7.3 MG/DL (ref 8.6–10.4)
CHLORIDE SERPL-SCNC: 97 MMOL/L (ref 98–107)
CHLORIDE SERPL-SCNC: 99 MMOL/L (ref 98–107)
CHOLEST SERPL-MCNC: 64 MG/DL (ref 0–199)
CO2 SERPL-SCNC: 25 MMOL/L (ref 22–30)
CO2 SERPL-SCNC: 26 MMOL/L (ref 22–30)
EOSINOPHIL # BLD AUTO: 0 K/UL (ref 0–0.7)
EOSINOPHIL # BLD AUTO: 0 K/UL (ref 0–0.7)
EOSINOPHIL NFR BLD: 0 % (ref 0–4)
EOSINOPHIL NFR BLD: 0.1 % (ref 0–4)
ERYTHROCYTE [DISTWIDTH] IN BLOOD BY AUTOMATED COUNT: 13.7 % (ref 11.5–14.5)
ERYTHROCYTE [DISTWIDTH] IN BLOOD BY AUTOMATED COUNT: 14 % (ref 11.5–14.5)
GLOBULIN SER-MCNC: 2.5 GM/DL (ref 2.2–3.9)
GLUCOSE SERPL-MCNC: 114 MG/DL (ref 65–105)
GLUCOSE SERPL-MCNC: 130 MG/DL (ref 65–105)
HCT VFR BLD CALC: 27.1 % (ref 34–47)
HCT VFR BLD CALC: 30.9 % (ref 34–47)
INR PPP: 2.3
LG PLATELETS BLD QL SMEAR: PRESENT
LYMPHOCYTES # BLD AUTO: 0.4 K/UL (ref 1–4.3)
LYMPHOCYTES # BLD AUTO: 0.5 K/UL (ref 1–4.3)
LYMPHOCYTES NFR BLD AUTO: 3.2 % (ref 20–40)
LYMPHOCYTES NFR BLD AUTO: 4.4 % (ref 20–40)
MAGNESIUM SERPL-MCNC: 1.4 MG/DL (ref 1.6–2.3)
MCH RBC QN AUTO: 30 PG (ref 27–31)
MCH RBC QN AUTO: 30.3 PG (ref 27–31)
MCHC RBC AUTO-ENTMCNC: 34 G/DL (ref 33–37)
MCHC RBC AUTO-ENTMCNC: 34.1 G/DL (ref 33–37)
MCV RBC AUTO: 88.1 FL (ref 81–99)
MCV RBC AUTO: 88.9 FL (ref 81–99)
MONOCYTES # BLD: 0.1 K/UL (ref 0–0.8)
MONOCYTES # BLD: 0.1 K/UL (ref 0–0.8)
MONOCYTES NFR BLD: 0.7 % (ref 0–10)
MONOCYTES NFR BLD: 0.8 % (ref 0–10)
NEUTROPHILS NFR BLD AUTO: 89 % (ref 50–75)
NEUTROPHILS NFR BLD AUTO: 91 % (ref 50–75)
NRBC BLD AUTO-RTO: 0 % (ref 0–2)
NRBC BLD AUTO-RTO: 0.1 % (ref 0–2)
PHOSPHATE SERPL-MCNC: 2.6 MG/DL (ref 2.5–4.5)
PLATELET # BLD: 179 K/UL (ref 130–400)
PLATELET # BLD: 198 K/UL (ref 130–400)
PMV BLD AUTO: 8 FL (ref 7.2–11.7)
PMV BLD AUTO: 8.6 FL (ref 7.2–11.7)
POTASSIUM SERPL-SCNC: 3.6 MMOL/L (ref 3.6–5.2)
POTASSIUM SERPL-SCNC: 3.8 MMOL/L (ref 3.6–5.2)
PROT SERPL-MCNC: 4.7 G/DL (ref 6.3–8.3)
SODIUM SERPL-SCNC: 131 MMOL/L (ref 132–148)
SODIUM SERPL-SCNC: 132 MMOL/L (ref 132–148)
TOTAL CELLS COUNTED BLD: 100
TOTAL CELLS COUNTED BLD: 100
TROPONIN I SERPL-MCNC: 0.72 NG/ML (ref 0–0.12)
WBC # BLD AUTO: 14.8 K/UL (ref 4.8–10.8)
WBC # BLD AUTO: 9.3 K/UL (ref 4.8–10.8)

## 2017-11-02 RX ADMIN — ALBUTEROL SULFATE SCH: 2.5 SOLUTION RESPIRATORY (INHALATION) at 04:16

## 2017-11-02 RX ADMIN — HUMAN INSULIN SCH: 100 INJECTION, SOLUTION SUBCUTANEOUS at 08:14

## 2017-11-02 RX ADMIN — CALCIUM CARBONATE-VITAMIN D TAB 500 MG-200 UNIT SCH TAB: 500-200 TAB at 10:57

## 2017-11-02 RX ADMIN — SODIUM CHLORIDE SCH MLS/HR: 0.9 INJECTION, SOLUTION INTRAVENOUS at 00:36

## 2017-11-02 RX ADMIN — SODIUM CHLORIDE SCH MLS/HR: 0.9 INJECTION, SOLUTION INTRAVENOUS at 08:52

## 2017-11-02 RX ADMIN — POTASSIUM CHLORIDE SCH MEQ: 1.5 SOLUTION ORAL at 10:55

## 2017-11-02 RX ADMIN — SODIUM CHLORIDE SCH MLS/HR: 0.9 INJECTION, SOLUTION INTRAVENOUS at 16:34

## 2017-11-02 RX ADMIN — ENOXAPARIN SODIUM SCH MG: 60 INJECTION SUBCUTANEOUS at 22:30

## 2017-11-02 RX ADMIN — ALBUTEROL SULFATE SCH: 2.5 SOLUTION RESPIRATORY (INHALATION) at 00:45

## 2017-11-02 RX ADMIN — HUMAN INSULIN SCH UNIT: 100 INJECTION, SOLUTION SUBCUTANEOUS at 12:30

## 2017-11-02 RX ADMIN — HUMAN INSULIN SCH: 100 INJECTION, SOLUTION SUBCUTANEOUS at 17:39

## 2017-11-02 RX ADMIN — CALCIUM CARBONATE-VITAMIN D TAB 500 MG-200 UNIT SCH TAB: 500-200 TAB at 17:00

## 2017-11-02 RX ADMIN — ALBUTEROL SULFATE SCH MG: 2.5 SOLUTION RESPIRATORY (INHALATION) at 20:55

## 2017-11-02 RX ADMIN — ALBUTEROL SULFATE SCH MG: 2.5 SOLUTION RESPIRATORY (INHALATION) at 17:10

## 2017-11-02 RX ADMIN — ALBUTEROL SULFATE SCH MG: 2.5 SOLUTION RESPIRATORY (INHALATION) at 07:29

## 2017-11-02 RX ADMIN — HUMAN INSULIN SCH: 100 INJECTION, SOLUTION SUBCUTANEOUS at 22:30

## 2017-11-02 RX ADMIN — ALBUTEROL SULFATE SCH MG: 2.5 SOLUTION RESPIRATORY (INHALATION) at 13:23

## 2017-11-02 RX ADMIN — CALCIUM CARBONATE-VITAMIN D TAB 500 MG-200 UNIT SCH: 500-200 TAB at 15:00

## 2017-11-02 RX ADMIN — POTASSIUM CHLORIDE SCH MEQ: 1.5 SOLUTION ORAL at 17:00

## 2017-11-02 NOTE — CP.PCM.PN
<Daly Carrillo - Last Filed: 11/02/17 16:18>





Subjective





- Date & Time of Evaluation


Date of Evaluation: 11/02/17


Time of Evaluation: 15:00





- Subjective


Subjective: 





Code stroke was called at 14:41. 


Patient found by surgical resident and RN with right facial droop. Rapid 

response was called first and changed quickly to a code stroke. 


vitals upon arrival were:


BP: 124/76


O2: 97% on nasal canula @2L


HR: 134 normal sinus rhythm 





Objective





- Vital Signs/Intake and Output


Vital Signs (last 24 hours): 


 











Temp Pulse Resp BP Pulse Ox


 


 98.3 F   117 H  20   115/71   99 


 


 11/02/17 08:28  11/02/17 08:28  11/02/17 08:28  11/02/17 10:55  11/02/17 08:28








Intake and Output: 


 











 11/02/17 11/02/17





 06:59 18:59


 


Intake Total 740 


 


Output Total 1005 1000


 


Balance -265 -1000














- Medications


Medications: 


 Current Medications





Acetaminophen (Tylenol 325mg Tab)  325 mg PO Q4 PRN


   PRN Reason: Fever >100.4 F


Albuterol Sulfate (Albuterol 0.083% Inhal Sol (2.5 Mg/3 Ml) Ud)  2.5 mg IH RQ4 

[facility]


   Last Admin: 11/02/17 13:23 Dose:  2.5 mg


Calcium/Vitamin D (Oyster Shell Calcium/Vitamin D 500 Mg-200 Iu)  1 tab PO TID 

[facility]


   Last Admin: 11/02/17 10:57 Dose:  1 tab


Dronabinol (Marinol)  2.5 mg PO BID [facility]


   Last Admin: 11/02/17 10:57 Dose:  2.5 mg


Furosemide (Lasix)  20 mg IVP DAILY [facility]


   Last Admin: 11/02/17 10:55 Dose:  20 mg


Heparin Sodium (Porcine) (Heparin)  5,000 units SC Q12 [facility]


   Last Admin: 11/02/17 10:56 Dose:  5,000 units


Hydromorphone HCl (Dilaudid)  0.5 mg IVP Q4H PRN


   PRN Reason: Pain, moderate (4-7)


   Last Admin: 11/01/17 18:00 Dose:  0.5 mg


Meropenem 500 mg/ Dextrose  100 mls @ 100 mls/hr IVPB Q8H [facility]


   Last Admin: 11/02/17 08:52 Dose:  100 mls/hr


Lactated Ringer's (Lactated Ringer's)  1,000 mls @ 20 mls/hr IV .Q24H [facility]


   Last Admin: 11/02/17 12:05 Dose:  Not Given


Insulin Human Regular (Novolin R)  0 unit SC ACHS [facility]


   PRN Reason: Protocol


   Last Admin: 11/02/17 12:30 Dose:  2 unit


Lactulose (Enulose)  20 gm PO Q6H [facility]


   Last Admin: 11/02/17 08:45 Dose:  20 gm


Metoclopramide HCl (Reglan)  5 mg IVP BID [facility]


   Last Admin: 11/02/17 10:56 Dose:  5 mg


Ondansetron HCl (Zofran Inj)  4 mg IVP Q4 PRN


   PRN Reason: Nausea/Vomiting


Pantoprazole Sodium (Protonix Inj)  40 mg IVP DAILY [facility]


   Last Admin: 11/02/17 10:55 Dose:  40 mg


Potassium Chloride (Potassium Chloride Oral Soln)  20 meq PO BID [facility]


   Last Admin: 11/02/17 10:55 Dose:  20 meq











- Labs


Labs: 


 





 11/02/17 07:55 





 11/02/17 07:55 





 











PT  15.5 SECONDS (9.7-12.2)  H  10/24/17  03:58    


 


INR  1.4   10/24/17  03:58    


 


APTT  28 SECONDS (21-34)   10/24/17  03:58    














Assessment and Plan





- Assessment and Plan (Free Text)


Plan: 





CT head without contrast - second ordered because first with motion and unable 

to be read 


Lipid panel


CBC


Neurology consulted (Dr. Kinney) - as per Dr. Kinney, no tPA at this time due to 

recent surgical procedure


NPO diet 


Swallow eval and treat 


Neuro checks per code stroke protocol 


NIHSS PRN 


Vitals Q4 


PT eval and treat 


OT eval and treat 


Transferred to telemetry 


Continue statin


follow up neuro recs 





NIHSS Stroke Scale





- Date/Time Evaluation Performed


Date Performed: 11/02/17


Time Performed: 14:40


When Was NIHSS Performed: Other (during code stroke)





- How Severe is the Stoke


Level of Consciousness: 0=Alert


LOC to Questions: 2=Neither correct (aphasia)


LOC to commands: 0=Obeys both correctly


Best Gaze: 0=Normal


Visual: 0=No visual loss


Facial: 2=Partial (lower face paralysis)


Motor Arm - Left: 0=No drift


Motor Arm - Right: 3=No effort against gravity (falls immediately)


Motor Leg - Left: 2=Falls before 5 sec


Motor Leg - Right: 3=No effort against gravity (falls immediately)


Limb Ataxia: 1=Present Upper or Lower


Sensory: 0=Normal


Best Language: 1=Mild to moderate aphasia


Dysarthia: 1=Mild to moderate slurring


Extinction & Inattention (Neglect): 0=Normal, no object


Score: 15


Severity Of Stroke: 5-15= Moderate Stroke





rTPA Inclusion/Exclusion





- Refusal of Treatment


Patient Refused Treatment: No





- Inclusion Criteria for Altepase


Patient is 18 years or Older: Yes


The Clinical Diagnosis of Ischemic Stroke That is Causing a Potentially 

Disabling Neurological Deficit: Yes


Time of Onset is Well Established to be Less Than 270 Minute Before Treatment 

Would Begin: Yes


Risk/Benefit Discussed With Patient/Family Member Present: No





- Exclusion Criteria for Altepase


Uncontrolled Hypertension at Time of Treatment (Systolic BP above 185 or 

Diastolic BP above 110 mmHg): No


Active Internal Bleeding: No


Known Bleeding Diathesis Including but Not Limited to: Platelets Below 100,000/

mm,PTT Above 40 sec After Heparin Use, Current Use of Oral Anitcoagulant With 

INR Greater Than 1.7 or PT Greater Than 15 secs: No


Evidence of an Intracranial Hemorrhage: No


Evidence of Major Acute Infarct With Signs Greater Than 1/3 MCA Territory: No


Suspicion of Subarachnoid Hemorrhage on Pretreatment Evaluation Even if CT Head 

Negative For Hemorrhage: No





- Warning to TPA With Conditions


Following Conditions Weighed Against Anticipated Benefit: Yes


Condition: Recent Surgery Less Than 15 Days


Additional Condition (For 3-4.5 Hour Window): Prior Stroke and Diabetes





<Stephen Liao H - Last Filed: 11/02/17 16:32>





Objective





- Vital Signs/Intake and Output


Vital Signs (last 24 hours): 


 











Temp Pulse Resp BP Pulse Ox


 


 98.3 F   117 H  20   115/71   99 


 


 11/02/17 08:28  11/02/17 08:28  11/02/17 08:28  11/02/17 10:55  11/02/17 08:28








Intake and Output: 


 











 11/02/17 11/02/17





 06:59 18:59


 


Intake Total 740 


 


Output Total 1005 1000


 


Balance -265 -1000














- Medications


Medications: 


 Current Medications





Acetaminophen (Tylenol 325mg Tab)  325 mg PO Q4 PRN


   PRN Reason: Fever >100.4 F


Albuterol Sulfate (Albuterol 0.083% Inhal Sol (2.5 Mg/3 Ml) Ud)  2.5 mg IH RQ4 

[facility]


   Last Admin: 11/02/17 13:23 Dose:  2.5 mg


Calcium/Vitamin D (Oyster Shell Calcium/Vitamin D 500 Mg-200 Iu)  1 tab PO TID 

[facility]


   Last Admin: 11/02/17 15:00 Dose:  Not Given


Dronabinol (Marinol)  2.5 mg PO BID [facility]


   Last Admin: 11/02/17 10:57 Dose:  2.5 mg


Furosemide (Lasix)  20 mg IVP DAILY [facility]


   Last Admin: 11/02/17 10:55 Dose:  20 mg


Heparin Sodium (Porcine) (Heparin)  5,000 units SC Q12 [facility]


   Last Admin: 11/02/17 10:56 Dose:  5,000 units


Hydromorphone HCl (Dilaudid)  0.5 mg IVP Q4H PRN


   PRN Reason: Pain, moderate (4-7)


   Last Admin: 11/01/17 18:00 Dose:  0.5 mg


Meropenem 500 mg/ Dextrose  100 mls @ 100 mls/hr IVPB Q8H [facility]


   Last Admin: 11/02/17 08:52 Dose:  100 mls/hr


Lactated Ringer's (Lactated Ringer's)  1,000 mls @ 20 mls/hr IV .Q24H [facility]


   Last Admin: 11/02/17 12:05 Dose:  Not Given


Insulin Human Regular (Novolin R)  0 unit SC ACHS [facility]


   PRN Reason: Protocol


   Last Admin: 11/02/17 12:30 Dose:  2 unit


Lactulose (Enulose)  20 gm PO Q6H [facility]


   Last Admin: 11/02/17 14:59 Dose:  Not Given


Metoclopramide HCl (Reglan)  5 mg IVP BID [facility]


   Last Admin: 11/02/17 10:56 Dose:  5 mg


Ondansetron HCl (Zofran Inj)  4 mg IVP Q4 PRN


   PRN Reason: Nausea/Vomiting


Pantoprazole Sodium (Protonix Inj)  40 mg IVP DAILY [facility]


   Last Admin: 11/02/17 10:55 Dose:  40 mg


Potassium Chloride (Potassium Chloride Oral Soln)  20 meq PO BID [facility]


   Last Admin: 11/02/17 10:55 Dose:  20 meq











- Labs


Labs: 


 





 11/02/17 16:04 





 11/02/17 07:55 





 











PT  26.3 SECONDS (9.7-12.2)  H  11/02/17  16:04    


 


INR  2.3   11/02/17  16:04    


 


APTT  50 SECONDS (21-34)  H  11/02/17  16:04    














Attending/Attestation





- Attestation


I have personally seen and examined this patient.: Yes


I have fully participated in the care of the patient.: Yes


I have reviewed all pertinent clinical information, including history, physical 

exam and plan: Yes


Notes (Text): 





11/02/17 16:30


We came to the rapid response on [address] [place]. It was explained to me that medical 

staff were trying to interview the patient when they noticed that she seemed to 

have a lot of right-sided facial droop - this appeared to be a new finding to 

them so a rapid response was called. We came to the RRT and instead called a 

CODE STROKE.





Our interview with the patient was somewhat limited due to language barrier 

however we did have a computer translation service with us however she was not 

following any commands or questions. She was awake she did appear to be alert 

she had a rather substantial right facial droop that the nurses and medical 

staff were working with the patient was a new finding. What I understand the 

patient has had a stroke before in the past and therefore has had right sided 

weakness particularly in her arms and in her legs.





We placed a monitor on the patient, an Accu-Chek was done this was fine, her 

blood pressure was also stable. Ordered a stat CT scan of the head. This did 

not show any areas of acute bleeding





I then got on the phone and I spoke with the code stroke neurologist on-call, 

Dr. MELYSSA Kinney and I reviewed the case with neurology. I also called the patient's 

primary care physician Dr. Rickey Collazo and notified him as well. Because the 

patient has had a very large abdominal intervention very recently it was 

decided to NOT give TPA. The patient has had a CVA in the distant past and does 

have a relatively high NIH stroke score 20-21. She is currently not on an 

aspirin or statin





Because of the patient's status, I do not feel comfortable allowing her to 

swallow so I placed in an NG tube. Her to give aspirin 325 x1 as well as 

Crestor 20 via the NG tube. A portable film was then taken to make sure that 

the NG tube was in the right place





Per my discussion with neurology we should try to get a stat MRI without 

contrast done. Because the patient still had surgical staples on her skin there 

was some question if we could get MRI. I spoke with radiology here at [facility] Dr. Stiles as well as Dr. Moreno [phone]. And it was decided that 

it would be safe for the patient get an MRI of her head done.





The patient will be moved up from the medical - surgical floors to the 

telemetry floor.





Before the patient was moved upstairs to the telemetry floor, the nurses 

explained to me that it appeared that the facial droop had resolved. I came by 

to reassess her  and it appears that the facial droop has resolved however she 

still appeared to be lethargic (this being said I don't know exactly what her 

baseline mental status is since this is my very first time meeting her)





Thank you very much,


Stephen Liao





rTPA Inclusion/Exclusion





- Refusal of Treatment


Patient Refused Treatment: No





- Inclusion Criteria for Altepase


Patient is 18 years or Older: Yes


The Clinical Diagnosis of Ischemic Stroke That is Causing a Potentially 

Disabling Neurological Deficit: Yes


Time of Onset is Well Established to be Less Than 270 Minute Before Treatment 

Would Begin: Yes


Risk/Benefit Discussed With Patient/Family Member Present: No





- Exclusion Criteria for Altepase


Uncontrolled Hypertension at Time of Treatment (Systolic BP above 185 or 

Diastolic BP above 110 mmHg): No


Active Internal Bleeding: No


Known Bleeding Diathesis Including but Not Limited to: Platelets Below 100,000/

mm,PTT Above 40 sec After Heparin Use, Current Use of Oral Anitcoagulant With 

INR Greater Than 1.7 or PT Greater Than 15 secs: No


Evidence of an Intracranial Hemorrhage: No


Evidence of Major Acute Infarct With Signs Greater Than 1/3 MCA Territory: No


Suspicion of Subarachnoid Hemorrhage on Pretreatment Evaluation Even if CT Head 

Negative For Hemorrhage: No





- Warning to TPA With Conditions


Following Conditions Weighed Against Anticipated Benefit: Yes


Condition: Recent Surgery Less Than 15 Days


Additional Condition (For 3-4.5 Hour Window): Prior Stroke and Diabetes

## 2017-11-02 NOTE — CT
PROCEDURE:  CT HEAD WITHOUT CONTRAST



HISTORY:

facial droop right



COMPARISON:

None available. 



TECHNIQUE:

Axial computed tomography images were obtained through the head/brain 

without intravenous contrast.  



Radiation dose:



Total exam DLP = 946 mGy-cm.



This CT exam was performed using one or more of the following dose 

reduction techniques: Automated exposure control, adjustment of the 

mA and/or kV according to patient size, and/or use of iterative 

reconstruction technique.



FINDINGS:



HEMORRHAGE:

Focal areas of increased attenuation seen within the anterior right 

frontal lobe measuring 3 and 5 millimeters as well as more inferiorly 

within the anterior right frontal lobe measuring 5 millimeters best 

seen on series 2, images 21 and series 2, image 16. This may 

represent streak artifact and repeat study may be helpful to exclude 

possible hemorrhage. 



BRAIN:

Confluent areas of low attenuation seen within the right frontal 

cortical and subcortical white matter, right corona radiata, and left 

posterior frontal cortical and subcortical white matter suggestive 

for areas of infarction, age indeterminate. Punctate foci of low 

attenuation seen within the right caudate head and basal ganglia 

suggestive for lacunar infarcts. Scattered focal lucencies in the 

subcortical and periventricular white matter suggestive for chronic 

microvascular ischemic change. 



VENTRICLES:

Unremarkable. No hydrocephalus. 



CALVARIUM:

Unremarkable.



PARANASAL SINUSES:

Unremarkable as visualized. No significant inflammatory changes.



MASTOID AIR CELLS:

Unremarkable as visualized. No inflammatory changes.



OTHER FINDINGS:

None.



IMPRESSION:

1. Focal areas of increased attenuation seen within the anterior 

right frontal lobe measuring 3 and 5 millimeters as well as more 

inferiorly within the anterior right frontal lobe measuring 5 

millimeters best seen on series 2, images 21 and series 2, image 16. 

This may represent streak artifact and repeat study may be helpful to 

exclude possible hemorrhage. Repeat study recommended if clinically 

indicated. 



2. Confluent areas of low attenuation seen within the right frontal 

cortical and subcortical white matter, right corona radiata, and left 

posterior frontal cortical and subcortical white matter suggestive 

for areas of infarction, age indeterminate. Correlation with 

diffusion-weighted MRI is recommended for further evaluation if 

clinically indicated. 



3. Punctate foci of low attenuation seen within the right caudate 

head and basal ganglia suggestive for lacunar infarcts. 



4. Scattered focal lucencies in the subcortical and periventricular 

white matter suggestive for chronic microvascular ischemic change.



These findings were discussed with Dr. Liao at 3:12 p.m. on 

11/02/2017.

## 2017-11-02 NOTE — RAD
HISTORY:

NGT placement  



COMPARISON:

Chest x-ray performed 11/1/17 



TECHNIQUE:

Chest, one view.



FINDINGS:

Nasogastric tube extends to the expected location of the SVC.  

Right-sided PICC extends the SVC. External defibrillator pads project 

over the upper abdomen bilaterally. Numerous external wires, leads, 

as well as a radiopaque device are noted. 



LUNGS:

Mild left basilar atelectasis or infiltrate. Subtle patchy opacity in 

the right upper lobe.  Bilateral hilar prominence. 



PLEURA:

No significant pleural effusion identified. No definite pneumothorax .



CARDIOVASCULAR:

Borderline cardiomegaly.



OSSEOUS STRUCTURES:

Degenerative changes. 



VISUALIZED UPPER ABDOMEN:

Unremarkable.



OTHER FINDINGS:

None.



IMPRESSION:

Nasogastric tube.  Right-sided PICC. 



Mild left basilar atelectasis or infiltrate. Subtle patchy opacity in 

the right upper lobe.  Bilateral hilar prominence.

## 2017-11-02 NOTE — CP.PCM.PN
Subjective





- Date & Time of Evaluation


Date of Evaluation: 11/02/17


Time of Evaluation: 07:00





- Subjective


Subjective: 


SURGERY PROGRESS NOTE FOR DR. GARNER





Patient seen and examined at bedside. Per nurse she is not eating anything 

except for small amounts of the food her family has brought her. GI was 

consulted for possible PEG tube.





Objective





- Vital Signs/Intake and Output


Vital Signs (last 24 hours): 


 











Temp Pulse Resp BP Pulse Ox


 


 98.3 F   117 H  20   115/71   99 


 


 11/02/17 08:28  11/02/17 08:28  11/02/17 08:28  11/02/17 10:55  11/02/17 08:28








Intake and Output: 


 











 11/02/17 11/02/17





 06:59 18:59


 


Intake Total 740 


 


Output Total 1005 


 


Balance -265 














- Medications


Medications: 


 Current Medications





Acetaminophen (Tylenol 325mg Tab)  325 mg PO Q4 PRN


   PRN Reason: Fever >100.4 F


Albuterol Sulfate (Albuterol 0.083% Inhal Sol (2.5 Mg/3 Ml) Ud)  2.5 mg IH RQ4 

[facility]


   Last Admin: 11/02/17 13:23 Dose:  2.5 mg


Calcium/Vitamin D (Oyster Shell Calcium/Vitamin D 500 Mg-200 Iu)  1 tab PO TID 

[facility]


   Last Admin: 11/02/17 10:57 Dose:  1 tab


Dronabinol (Marinol)  2.5 mg PO BID [facility]


   Last Admin: 11/02/17 10:57 Dose:  2.5 mg


Furosemide (Lasix)  20 mg IVP DAILY [facility]


   Last Admin: 11/02/17 10:55 Dose:  20 mg


Heparin Sodium (Porcine) (Heparin)  5,000 units SC Q12 [facility]


   Last Admin: 11/02/17 10:56 Dose:  5,000 units


Hydromorphone HCl (Dilaudid)  0.5 mg IVP Q4H PRN


   PRN Reason: Pain, moderate (4-7)


   Last Admin: 11/01/17 18:00 Dose:  0.5 mg


Meropenem 500 mg/ Dextrose  100 mls @ 100 mls/hr IVPB Q8H [facility]


   Last Admin: 11/02/17 08:52 Dose:  100 mls/hr


Lactated Ringer's (Lactated Ringer's)  1,000 mls @ 20 mls/hr IV .Q24H [facility]


   Last Admin: 11/02/17 12:05 Dose:  Not Given


Insulin Human Regular (Novolin R)  0 unit SC ACHS [facility]


   PRN Reason: Protocol


   Last Admin: 11/02/17 12:30 Dose:  2 unit


Lactulose (Enulose)  20 gm PO Q6H [facility]


   Last Admin: 11/02/17 08:45 Dose:  20 gm


Metoclopramide HCl (Reglan)  5 mg IVP BID [facility]


   Last Admin: 11/02/17 10:56 Dose:  5 mg


Ondansetron HCl (Zofran Inj)  4 mg IVP Q4 PRN


   PRN Reason: Nausea/Vomiting


Pantoprazole Sodium (Protonix Inj)  40 mg IVP DAILY [facility]


   Last Admin: 11/02/17 10:55 Dose:  40 mg


Potassium Chloride (Potassium Chloride Oral Soln)  20 meq PO BID [facility]


   Last Admin: 11/02/17 10:55 Dose:  20 meq











- Labs


Labs: 


 





 11/02/17 07:55 





 11/02/17 07:55 





 











PT  15.5 SECONDS (9.7-12.2)  H  10/24/17  03:58    


 


INR  1.4   10/24/17  03:58    


 


APTT  28 SECONDS (21-34)   10/24/17  03:58    














- Constitutional


Appears: Non-toxic, No Acute Distress





- Respiratory Exam


Respiratory Exam: NORMAL BREATHING PATTERN.  absent: Respiratory Distress





- Cardiovascular Exam


Cardiovascular Exam: Tachycardia, +S1, +S2





- GI/Abdominal Exam


GI & Abdominal Exam: Soft, Tenderness (mild tenderness at incision site).  

absent: Distended, Firm, Guarding, Rigid, Rebound


Additional comments: 


Mohler in place


Taqueria drain with 45cc serous drainage over past 24 hours





- Neurological Exam


Neurological Exam: Alert, Awake





- Skin


Skin Exam: Dry, Normal Color, Warm





Assessment and Plan





- Assessment and Plan (Free Text)


Assessment: 


75 y/o F w/ uterine infection, L adenexal mass POD#7 s/p KASSY BSO





- Taqueria drain with 45cc serous drainage over past 24 hours, drained removed 

this AM


- Ambrosio DCed today, was kept for 1 week due to nature of operation


- Will keep staples in place for 14 days post op


- Cont abx per ID


- Replete electrolytes PRN


- Encouraged OOB to chair/IS use


- GI/DVT PPx





Plan discussed w/ Dr. Radha Eaton PGY-3

## 2017-11-02 NOTE — RAD
HISTORY:

toxic megacolon  



COMPARISON:

10/29/2017



FINDINGS:



BOWEL:

Normal. No obstruction. No free air. Distention of the colon 

identified previously is no longer seen.  Bowel gas pattern 

compatible with postoperative ileus.



BONES:

Normal.



OTHER FINDINGS:

None.



IMPRESSION:

No acute findings related to/accounting  for the clinical 

presentation. Satisfactory postoperative status.

## 2017-11-02 NOTE — CP.PCM.CON
History of Present Illness





- History of Present Illness


History of Present Illness: 





COVERING DR BATEMAN





75 yo [place] female admitted with abdominal pain, sepsis and leukocytosis 

found to have a fecal impaction disimpacted in OR by Dr Garcia who then 

continued to have pain and high white count. Laparotomy was performed showing a 

collection or abscess in uterine wall resulting in KASSY being done. Patient has 

had no po intacke and has been malnourished since the post op period. MD is 

requesting evaluation for PEG tube placement to allow discharge back to nursing 

home facility. As patient does not speak english I am not sure of the patients 

or families desires in this matter. No bleeding N/V. No NGT in place for 

feedings as well. 





Review of Systems





- Review of Systems


Systems not reviewed;Unavailable: Language Barrier, Other





Past Patient History





- Infectious Disease


Hx of Infectious Diseases: None





- Tetanus Immunizations


Tetanus Immunization: Unknown





- Past Medical History & Family History


Past Medical History?: Yes





- Past Social History


Smoking Status: Never Smoked


Alcohol: None


Drugs: Other (pt was undiagnosed diabetic until after admission for the stroke 

and until pt assigned to me at sub acute rehab )


Domestic Violence: Negative





- CARDIAC


Hx Hypercholesterolemia:  (Hyperlipidimia)


Hx Hypertension: Yes





- PULMONARY


Hx Respiratory Disorders: No





- NEUROLOGICAL


HX Cerebrovascular Accident: Yes (RUE/RLE HEMIPLEGIA)





- HEENT


Other/Comment: DYSPHAGIA, PT HAS PEG





- RENAL


Hx Chronic Kidney Disease: No





- ENDOCRINE/METABOLIC


Hx Diabetes Mellitus Type 2: Yes





- HEMATOLOGICAL/ONCOLOGICAL


Hx Blood Disorders: No





- INTEGUMENTARY


Hx Dermatological Problems: No





- MUSCULOSKELETAL/RHEUMATOLOGICAL


Hx Arthritis: Yes





- GASTROINTESTINAL


Hx Gastrointestinal Disorders: Yes


Other/Comment: Prior PEG placement,  INCONTINENT OF STOOL





- GENITOURINARY/GYNECOLOGICAL


Hx Genitourinary Disorders: Yes


Other/Comment: INCONTINENT OF URINE,





- PSYCHIATRIC


Hx Psychophysiologic Disorder: No


Hx Substance Use: No





- SURGICAL HISTORY


Hx Surgeries: No





- ANESTHESIA


Hx Anesthesia: Yes





Meds


Allergies/Adverse Reactions: 


 Allergies











Allergy/AdvReac Type Severity Reaction Status Date / Time


 


No Known Allergies Allergy   Verified 10/24/17 06:20














- Medications


Medications: 


 Current Medications





Acetaminophen (Tylenol 325mg Tab)  325 mg PO Q4 PRN


   PRN Reason: Fever >100.4 F


Albuterol Sulfate (Albuterol 0.083% Inhal Sol (2.5 Mg/3 Ml) Ud)  2.5 mg IH RQ4 

[facility]


   Last Admin: 11/02/17 07:29 Dose:  2.5 mg


Calcium/Vitamin D (Oyster Shell Calcium/Vitamin D 500 Mg-200 Iu)  1 tab PO TID 

[facility]


   Last Admin: 11/02/17 10:57 Dose:  1 tab


Dronabinol (Marinol)  2.5 mg PO BID [facility]


   Last Admin: 11/02/17 10:57 Dose:  2.5 mg


Furosemide (Lasix)  20 mg IVP DAILY [facility]


   Last Admin: 11/02/17 10:55 Dose:  20 mg


Heparin Sodium (Porcine) (Heparin)  5,000 units SC Q12 [facility]


   Last Admin: 11/02/17 10:56 Dose:  5,000 units


Hydromorphone HCl (Dilaudid)  0.5 mg IVP Q4H PRN


   PRN Reason: Pain, moderate (4-7)


   Last Admin: 11/01/17 18:00 Dose:  0.5 mg


Meropenem 500 mg/ Dextrose  100 mls @ 100 mls/hr IVPB Q8H [facility]


   Last Admin: 11/02/17 08:52 Dose:  100 mls/hr


Lactated Ringer's (Lactated Ringer's)  1,000 mls @ 20 mls/hr IV .Q24H [facility]


   Last Admin: 11/02/17 12:05 Dose:  Not Given


Insulin Human Regular (Novolin R)  0 unit SC ACHS [facility]


   PRN Reason: Protocol


   Last Admin: 11/02/17 08:14 Dose:  Not Given


Lactulose (Enulose)  20 gm PO Q6H [facility]


   Last Admin: 11/02/17 08:45 Dose:  20 gm


Metoclopramide HCl (Reglan)  5 mg IVP BID [facility]


   Last Admin: 11/02/17 10:56 Dose:  5 mg


Ondansetron HCl (Zofran Inj)  4 mg IVP Q4 PRN


   PRN Reason: Nausea/Vomiting


Pantoprazole Sodium (Protonix Inj)  40 mg IVP DAILY [facility]


   Last Admin: 11/02/17 10:55 Dose:  40 mg


Potassium Chloride (Potassium Chloride Oral Soln)  20 meq PO BID [facility]


   Last Admin: 11/02/17 10:55 Dose:  20 meq











Physical Exam





- Constitutional


Appears: No Acute Distress





- Head Exam


Head Exam: ATRAUMATIC, NORMOCEPHALIC





- Eye Exam


Eye Exam: EOMI, PERRL





- Respiratory Exam


Respiratory Exam: NORMAL BREATHING PATTERN





- Cardiovascular Exam


Cardiovascular Exam: REGULAR RHYTHM





- GI/Abdominal Exam


GI & Abdominal Exam: Hypoactive Bowel Sounds, Soft.  absent: Distended, Rebound

, Rigid, Tenderness


Additional comments: 





Large midline incision to epigastric area with staples in place. Superior to 

this appears to be scar from old PEG tube?? No current PEG in place.





- Rectal Exam


Rectal Exam: Deferred





- Extremities Exam


Extremities exam: Positive for: normal inspection.  Negative for: pedal edema





- Neurological Exam


Neurological exam: Alert


Additional comments: 





Alert and Responds to verbal questioning by turning to voice but not verbally 

responding. 





Results





- Vital Signs


Recent Vital Signs: 


 Last Vital Signs











Temp  98.3 F   11/02/17 08:28


 


Pulse  117 H  11/02/17 08:28


 


Resp  20   11/02/17 08:28


 


BP  115/71   11/02/17 10:55


 


Pulse Ox  99   11/02/17 08:28














- Labs


Result Diagrams: 


 11/02/17 07:55





 11/02/17 07:55


Labs: 


 Laboratory Results - last 24 hr











  11/01/17 11/01/17 11/01/17





  11:44 16:04 21:18


 


WBC  11.0 H  


 


RBC  3.44 L  


 


Hgb  10.1 L  


 


Hct  30.4 L  


 


MCV  88.4  


 


MCH  29.2  


 


MCHC  33.1  


 


RDW  13.9  


 


Plt Count  268  


 


MPV  8.8  


 


Neut % (Auto)  87.7 H  


 


Lymph % (Auto)  7.7 L  


 


Mono % (Auto)  4.0  


 


Eos % (Auto)  0.4  


 


Baso % (Auto)  0.2  


 


Neut #  9.7 H  


 


Lymph #  0.9 L  


 


Mono #  0.4  


 


Eos #  0.0  


 


Baso #  0.0  


 


Neutrophils % (Manual)  88 H  


 


Band Neutrophils %   


 


Lymphocytes % (Manual)  9 L  


 


Monocytes % (Manual)  2  


 


Eosinophils % (Manual)  1  


 


Platelet Estimate  Normal  


 


Large Platelets   


 


Hypochromasia (manual)  Slight  


 


Poikilocytosis (manual  Slight  


 


Anisocytosis (manual)  Slight  


 


Sodium   


 


Potassium   


 


Chloride   


 


Carbon Dioxide   


 


Anion Gap   


 


BUN   


 


Creatinine   


 


Est GFR ( Amer)   


 


Est GFR (Non-Af Amer)   


 


POC Glucose (mg/dL)   131 H  118 H


 


Random Glucose   


 


Calcium   


 


Phosphorus   


 


Magnesium   














  11/02/17 11/02/17 11/02/17





  07:08 07:55 07:55


 


WBC   14.8 H 


 


RBC   3.05 L 


 


Hgb   9.3 L 


 


Hct   27.1 L 


 


MCV   88.9 


 


MCH   30.3 


 


MCHC   34.1 


 


RDW   13.7 


 


Plt Count   198 


 


MPV   8.6 


 


Neut % (Auto)   96.0 H 


 


Lymph % (Auto)   3.2 L 


 


Mono % (Auto)   0.7 


 


Eos % (Auto)   0.0 


 


Baso % (Auto)   0.1 


 


Neut #   14.2 H 


 


Lymph #   0.5 L 


 


Mono #   0.1 


 


Eos #   0.0 


 


Baso #   0.0 


 


Neutrophils % (Manual)   89 H 


 


Band Neutrophils %   6 H 


 


Lymphocytes % (Manual)   5 L 


 


Monocytes % (Manual)   TEST NOT PERFORMED 


 


Eosinophils % (Manual)   


 


Platelet Estimate   Normal 


 


Large Platelets   Present 


 


Hypochromasia (manual)   Slight 


 


Poikilocytosis (manual   Slight 


 


Anisocytosis (manual)   Slight 


 


Sodium    132


 


Potassium    3.8


 


Chloride    99


 


Carbon Dioxide    25


 


Anion Gap    12


 


BUN    13


 


Creatinine    0.7


 


Est GFR ( Amer)    > 60


 


Est GFR (Non-Af Amer)    > 60


 


POC Glucose (mg/dL)  146 H  


 


Random Glucose    114 H


 


Calcium    7.3 L


 


Phosphorus    2.6


 


Magnesium    1.4 L














  11/02/17





  11:25


 


WBC 


 


RBC 


 


Hgb 


 


Hct 


 


MCV 


 


MCH 


 


MCHC 


 


RDW 


 


Plt Count 


 


MPV 


 


Neut % (Auto) 


 


Lymph % (Auto) 


 


Mono % (Auto) 


 


Eos % (Auto) 


 


Baso % (Auto) 


 


Neut # 


 


Lymph # 


 


Mono # 


 


Eos # 


 


Baso # 


 


Neutrophils % (Manual) 


 


Band Neutrophils % 


 


Lymphocytes % (Manual) 


 


Monocytes % (Manual) 


 


Eosinophils % (Manual) 


 


Platelet Estimate 


 


Large Platelets 


 


Hypochromasia (manual) 


 


Poikilocytosis (manual 


 


Anisocytosis (manual) 


 


Sodium 


 


Potassium 


 


Chloride 


 


Carbon Dioxide 


 


Anion Gap 


 


BUN 


 


Creatinine 


 


Est GFR ( Amer) 


 


Est GFR (Non-Af Amer) 


 


POC Glucose (mg/dL)  167 H


 


Random Glucose 


 


Calcium 


 


Phosphorus 


 


Magnesium 














Assessment & Plan


(1) Fecal impaction


Assessment and Plan: 


S/P disimpaction in OR and subsequent laparotomy. 


Lactulose and Colace as needed.


Status: Resolved   Onset Date: ~10/20/17   





(2) Malnutrition following gastrointestinal surgery


Assessment and Plan: 


Unclear as to definitive need for PEG though PO intake has been inadequate and 

she has had PEG before. Not sure when it was removed???


In view of presence of current midline staples, this will make placement of PEG 

difficult to potentially find a location. Would defer PEG until staples removed 

and cleared by surgery team for PEG if needed. Await information from family on 

PEG placement. May be scheduled for sometime next week if needed. 


Consider NGT/Dobhoff tube for feedings currently if needed. Speech and 

Swallowing reevatluation. 


Status: Chronic   





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


Status: Chronic   





(4) Dysphagia as late effect of cerebrovascular accident (CVA)


Assessment and Plan: 


Advise speech and swallowing evaluation. 


?Definitive need for PEG replacement? 


Again can be scheduled after the weekend if cleared by surgery and staples have 

been removed. 


Status: Chronic   Priority: Medium

## 2017-11-02 NOTE — CT
PROCEDURE:  CT Chest with contrast



HISTORY:

r/o PE



COMPARISON:

None.



TECHNIQUE:

Contiguous axial images were obtained through the chest with 

intravenous contrast enhancement. Sagittal and coronal 

reconstructions were performed.



IV contrast: Visipaque 320, 100 cc.



Radiation dose (DLP): 310.82 mGy-cm.



This CT exam was performed using one or more of the following dose 

reduction techniques: Automated exposure control, adjustment of the 

mA and/or kV according to patient size, and/or use of iterative 

reconstruction technique.



FINDINGS:



LUNGS:

Bilateral basilar dependent atelectasis appreciate with limited left 

basilar compressive atelectasis as well.  Underlying pneumonia is not 

favored but is not completely excluded. No discrete mass identified 

bilaterally including the central airways. No pneumothorax 

bilaterally.



MEDIASTINUM:

Unremarkable thoracic aorta. No aneurysm or dissection. Cardiomegaly 

is identified with extensive coronary artery disease. Main pulmonary 

artery unremarkable. No CT evidence of pulmonary embolus. No 

lymphadenopathy. A nasogastric tube is identified placed, terminating 

in the gastric fundus. A PICC catheter is identified placed by right 

upper extremity approach with the tips are in the superior vena cava.



PLEURA:

Trace right pleural effusion.  Minimal left pleural effusion. No 

pericardial effusion.



BONES:

No fracture. No destructive lesion. 



UPPER ABDOMEN:

Grossly unremarkable.



OTHER FINDINGS:

None.



IMPRESSION:

Trace bilateral pleural effusions are seen slightly greater the left 

and right sides with bilateral basilar atelectasis appreciated with 

trace left basilar compressive atelectasis also.  Underlying 

pneumonitis is not favored is unlikely but is not completely 

excluded. No discrete pulmonary mass identified. 



No CT evidence of pulmonary embolus. 



Right PICC inserted. Nasogastric tube also present.

## 2017-11-02 NOTE — CP.PCM.PN
Subjective





- Date & Time of Evaluation


Date of Evaluation: 11/02/17


Time of Evaluation: 19:00





- Subjective


Subjective: 





Called by nurse today reporting that pt had been under observation by hospital 

staff for several hours after a sudden increase in her HR from her baseline of 

90s to low 100s to 135s. Pt's VS have remained stable all througout, but 

something of this nature cannot go on indefinitely, especially in somebody who 

is  POD # 8 with multiple risk factors for an acute life-ending event. House MD 

service was called on Code Stroke from signs of new right sided facial drooping 

as well as new perceived new weakness of the R arm. By the time I was called, 

pt was still tachycardic. Since none of the other tests for a brain bleed or 

pulmonary embolus were found to be the cause, I decided to run a few more tests 

which yielded info that may point the way. It should be noted that I left Ms Kirkland comfortable, and not in apparent distress when she lifted her left arm to 

say goodbye.                          





Objective





- Vital Signs/Intake and Output


Vital Signs (last 24 hours): 


 











Temp Pulse Resp BP Pulse Ox


 


 99.3 F   128 H  18   94/59 L  100 


 


 11/02/17 18:29  11/02/17 18:29  11/02/17 18:29  11/02/17 18:29  11/02/17 18:29








Intake and Output: 


 











 11/02/17 11/03/17





 18:59 06:59


 


Intake Total 240 


 


Output Total 1000 


 


Balance -760 














- Medications


Medications: 


 Current Medications





Acetaminophen (Tylenol 325mg Tab)  325 mg PO Q4 PRN


   PRN Reason: Fever >100.4 F


Albuterol Sulfate (Albuterol 0.083% Inhal Sol (2.5 Mg/3 Ml) Ud)  2.5 mg IH RQ4 

[facility]


   Last Admin: 11/02/17 17:10 Dose:  2.5 mg


Aspirin (Ecotrin)  81 mg PO DAILY [facility]


Calcium/Vitamin D (Oyster Shell Calcium/Vitamin D 500 Mg-200 Iu)  1 tab PO TID 

[facility]


   Last Admin: 11/02/17 17:00 Dose:  1 tab


Dronabinol (Marinol)  2.5 mg PO BID [facility]


   Last Admin: 11/02/17 17:00 Dose:  2.5 mg


Heparin Sodium (Porcine) (Heparin)  5,000 units SC Q12 [facility]


   Last Admin: 11/02/17 10:56 Dose:  5,000 units


Hydromorphone HCl (Dilaudid)  0.5 mg IVP Q4H PRN


   PRN Reason: Pain, moderate (4-7)


   Last Admin: 11/01/17 18:00 Dose:  0.5 mg


Meropenem 500 mg/ Dextrose  100 mls @ 100 mls/hr IVPB Q8H [facility]


   Last Admin: 11/02/17 16:34 Dose:  100 mls/hr


Lactated Ringer's (Lactated Ringer's)  1,000 mls @ 50 mls/hr IV .Q20H [facility]


Insulin Human Regular (Novolin R)  0 unit SC ACHS [facility]


   PRN Reason: Protocol


   Last Admin: 11/02/17 17:39 Dose:  Not Given


Lactulose (Enulose)  20 gm PO Q6H [facility]


   Last Admin: 11/02/17 14:59 Dose:  Not Given


Metoclopramide HCl (Reglan)  5 mg IVP BID [facility]


   Last Admin: 11/02/17 17:37 Dose:  5 mg


Ondansetron HCl (Zofran Inj)  4 mg IVP Q4 PRN


   PRN Reason: Nausea/Vomiting


Pantoprazole Sodium (Protonix Inj)  40 mg IVP DAILY [facility]


   Last Admin: 11/02/17 10:55 Dose:  40 mg


Potassium Chloride (Potassium Chloride Oral Soln)  20 meq PO BID [facility]


   Last Admin: 11/02/17 17:00 Dose:  20 meq


Rosuvastatin Calcium (Crestor)  5 mg PO HS [facility]











- Labs


Labs: 


 





 11/02/17 16:04 





 11/02/17 07:55 





 











PT  26.3 SECONDS (9.7-12.2)  H  11/02/17  16:04    


 


INR  2.3   11/02/17  16:04    


 


APTT  50 SECONDS (21-34)  H  11/02/17  16:04    














- Constitutional


Appears: No Acute Distress





- Head Exam


Head Exam: NORMAL INSPECTION, NORMOCEPHALIC





- Eye Exam


Eye Exam: Normal appearance


Pupil Exam: NORMAL ACCOMODATION





- ENT Exam


ENT Exam: Mucous Membranes Moist, Normal Exam





- Neck Exam


Neck Exam: Normal Inspection





- Respiratory Exam


Respiratory Exam: Clear to Ausculation Bilateral, Rales, NORMAL BREATHING 

PATTERN





- Cardiovascular Exam


Cardiovascular Exam: RRR





- GI/Abdominal Exam


GI & Abdominal Exam: Hypoactive Bowel Sounds





- Rectal Exam


Rectal Exam: Deferred


Additional comments: 





+ large BM today





-  Exam


 Exam: NORMAL INSPECTION (+ spring cath)


External exam: NORMAL EXTERNAL EXAM





- Extremities Exam


Extremities Exam: Normal Inspection





- Back Exam


Back Exam: NORMAL INSPECTION





- Neurological Exam


Neurological Exam: Altered, Awake, CN II-XII Intact, Normal Gait, Oriented x3


Neuro motor strength exam: Left Upper Extremity: 5, Right Upper Extremity: 5, 

Left Lower Extremity: 5, Right Lower Extremity: 5





- Psychiatric Exam


Psychiatric exam: Normal Affect, Normal Mood





- Skin


Skin Exam: Dry, Intact (back has pressure ucers), Normal Color, Warm





Assessment and Plan


(1) Endometritis


Assessment & Plan: 


on IV abx, to continue


Status: Suspected   





(2) Fecal impaction


Assessment & Plan: 


aooears to be mobilizing


Status: Resolved   





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


Assessment & Plan: 


assess if new stroke occurred


Status: Chronic   





(4) Diabetes mellitus


Assessment & Plan: 


determine occurrence of hyperglycemia and if with relationship with diabetic 

gastroparesis


Status: Chronic   





(5) Malnutrition following gastrointestinal surgery


Assessment & Plan: 


ordered video swallow for pt to assess for silent aspiration


Status: Chronic   





(6) Bacteremia


Assessment & Plan: 


PICC in place for 5 more weeks of IV abx


Status: Acute

## 2017-11-02 NOTE — PCM.RRT
<Daly Carrillo - Last Filed: 11/02/17 15:07>





RRT Nurses Assessment





- Situation


Date: 11/02/17


Time RRT was called: 14:40


New IV Insertion Tolerance: Good





- Ventilator Settings


SAO2 %: 98





I.Reason for RRT





- A) Acute Change in Patient:


(Select all that apply): Acute change in mental status


Subjective: 





Rapid response was called at 14:40 for right facial droop. 


Upon arrival, vital signs were as follows: /76, O2 sat 97% on NC @2L, 

sinus tachy at 134


Accucheck 144


RR converted to code stroke 





Plan





- Assessment of Findings&Treatment Plan





Please see progress note for code stroke protocol 





<Stephen Liao - Last Filed: 11/02/17 16:29>





RRT Nurses Assessment





- Vital Signs


Vital Signs: 


Rapid Response Vital Sign





Blood Pressure                   108/73


Pulse Rate                       133


Respiratory Rate                 20


Temperature                      98.9 F


Oxygen Saturation                98











- Vital Signs at end of RRT


Vital Signs at end of RRT: 


Rapid Response End Vital Sign





Blood Pressure                   124/76


Pulse Rate                       134


Respiratory Rate                 20


Temperature                      98.9 F


O2 Sat by Pulse Oximetry         97











Attending/Attestation





- Attestation


I have personally seen and examined this patient.: Yes


I have fully participated in the care of the patient.: Yes


I have reviewed all pertinent clinical information, including history, physical 

exam and plan: Yes


Notes (Text): 





11/02/17 16:29


We came to the rapid response on [address]. It was explained to me that medical 

staff were trying to interview the patient when they noticed that she seemed to 

have a lot of right-sided facial droop - this appeared to be a new finding to 

them so a rapid response was called. We came to the RRT and instead called a 

CODE STROKE.





Our interview with the patient was somewhat limited due to language barrier 

however we did have a computer translation service with us however she was not 

following any commands or questions. She was awake she did appear to be alert 

she had a rather substantial right facial droop that the nurses and medical 

staff were working with the patient was a new finding. What I understand the 

patient has had a stroke before in the past and therefore has had right sided 

weakness particularly in her arms and in her legs.





We placed a monitor on the patient, an Accu-Chek was done this was fine, her 

blood pressure was also stable. Ordered a stat CT scan of the head. This did 

not show any areas of acute bleeding





I then got on the phone and I spoke with the [facility] [place], [place] stroke neurologist on-call, 

Dr. MELYSSA Kinney and I reviewed the case with neurology. I also called the patient's 

primary care physician Dr. Rickey Collazo and notified him as well. Because the 

patient has had a very large abdominal intervention very recently it was 

decided to NOT give TPA. The patient has had a CVA in the distant past and does 

have a relatively high NIH stroke score 20-21. She is currently not on an 

aspirin or statin





Because of the patient's status, I do not feel comfortable allowing her to 

swallow so I placed in an NG tube. Her to give aspirin 325 x1 as well as 

Crestor 20 via the NG tube. A portable film was then taken to make sure that 

the NG tube was in the right place





Per my discussion with neurology we should try to get a stat MRI without 

contrast done. Because the patient still had surgical staples on her skin there 

was some question if we could get MRI. I spoke with radiology here at [facility] Dr. Stiles as well as Dr. Moreno [phone]. And it was decided that 

it would be safe for the patient get an MRI of her head done.





The patient will be moved up from the medical - surgical floors to the 

telemetry floor.





Before the patient was moved upstairs to the telemetry floor, the nurses 

explained to me that it appeared that the facial droop had resolved. I came by 

to reassess her  and it appears that the facial droop has resolved however she 

still appeared to be lethargic (this being said I don't know exactly what her 

baseline mental status is since this is my very first time meeting her)





Thank you very much,


Stephen Liao

## 2017-11-02 NOTE — PCM.OP
Operative Report





- Operative Report


Date of Surgery/Procedure: 10/26/17


Time of Surgery/Procedure: 15:00


Surgeon: Chad Gonzalez MD


Assistant: Peng Henry MD, PGY3 resident [facility]


Anesthesia/Sedation: Gen. anesthesia with ET tube


Pre-Operative Diagnosis: Left adnexal mass.  Uterine perforation.  Necrotic 

uterus / uterine abscess.  Pyometrium


Post-Operative Diagnosis: Left adnexal mass.  Uterine perforation.  Necrotic 

uterus / uterine abscess.  Pyometrium


Indication for Surgery: Severe leukocytosis/sepsis.  Intraoperative 

consultation was called by Dr. Radha saini to 76 years old  female 

to the operating room for possible colectomy for a presumed diagnosis of bile 

necrosis/impaction possible bowel ischemia. Upon a laparotomy incision and an 

intra-abdominal intrapelvic survey Dr. Henry noted a left adnexal mass with 

a possible field uterus and a uterine perforation along with extensive 

anatomical destruction and severe adhesions along the left adnexa. I was called 

to provide intraoperative consultation and upon my assessment and a long 

discussion with Dr. Henry a decision was made to proceed with total 

abdominal hysterectomy bilateral salpingo-oophorectomy and excision of this 

left adnexal mass. A colonic involvement was noted as the left adnexal mass was 

attached and tightly adherent to the colon.


Operative Findings: Intraoperative consultation was called by Dr. Radha saini 

to 76 years old  female to the operating room for possible colectomy 

for a presumed diagnosis of bile necrosis/impaction possible bowel ischemia. 

Upon a laparotomy incision and an intra-abdominal intrapelvic survey Dr. Henry noted a left adnexal mass with a possible field uterus and a uterine 

perforation along with extensive anatomical destruction and severe adhesions 

along the left adnexa. I was called to provide intraoperative consultation and 

upon my assessment and a long discussion with Dr. Henry a decision was made 

to proceed with total abdominal hysterectomy bilateral salpingo-oophorectomy 

and excision of this left adnexal mass. A colonic involvement was noted as the 

left adnexal mass was attached and tightly adherent to the colon. Careful 

dissection of this adnexal mass as well as the posterior aspect of the uterus 

from D: Enabled us to complete the hysterectomy. Copious amount of pus was 

freely flowing from the endometrial cavity. Diagnostic cystoscopy was completed 

following the procedure to ascertain bladder and ureter's integrity. Diagnostic 

cystoscopy at the end showed normal-appearing bladder and functional ureters 

effluxing urine freely. Upon completion of this hysterectomy and assessment of 

the specimen showed a necrotic uterus with an abscess-like formation within the 

endometrial cavity and ill-defined left adnexal mass which was excised and sent 

to pathology labeled appropriately. The right adnexa appeared normal.


Procedure/Operation Description: Total abdominal hysterectomy bilateral salpingo

-oophorectomy.  Left adnexal mass excision.  Lysis of adhesions.  Diagnostic 

cystoscopy.  .  ***Detailed operative report***.  This is a 76 years old 

 female with a left adnexal mass contingent with the colon and 

necrotic uterus with an abscess-like formation within the endometrial cavity 

and extensive intra-abdominal intrapelvic adhesions. Intraoperative 

consultation was called by Dr. Henry. Upon my assessment decision was made 

to proceed with total abdominal hysterectomy bilateral salpingo-oophorectomy. A 

midline vertical incision was made past the umbilicus extending all the way 

down towards the pubic symphysis. An abdominal self-retaining retractor was 

utilized to provide exposure for this hysterectomy. The bowel was packed away 

from the pelvic cavity. The abdomen was throughout the irrigated and suctioned 

copiously. Copious amount of pus was flowing from the perforated uterus prior 

to the initiation of this hysterectomy.  On the patient right side, the round 

ligament was suture ligated cauterized and transected. The IP ligament was 

doubly ligated cauterized and transected. The broad ligament was also 

transected and incision was extended towards the cervical uterine junction. On 

the left side, extensive lysis of adhesions and enterocele lysis was necessary 

to free up the left adnexa. The left adnexal mass was tightly adherent to the 

left pelvic sidewall in close proximity to the left ureter. Sharp and blunt 

dissection was utilized to free up the mass. The IP ligament was carefully 

suture ligated transected. The left round ligament was cauterized transected 

and the broad ligament was opened in the midline and incision was extended 

towards the uterocervical junction. Careful attention was placed to avoid 

ureter compromise as the mass was tightly adherent to the pelvic sidewall in 

close proximity. A bladder flap was created by opening the vesicouterine 

peritoneum and a bladder blade was inserted to protect the bladder. Colpotomy 

incision was made utilizing cautery as well as sharp dissection and uterus 

along with both ovaries fallopian tubes and left adnexal mass was sent to 

pathology labeled appropriately. The vaginal cuff was reapproximated with 0 

Vicryl suture material in interrupted fashion. The abdomen was throughout the 

irrigated cleared of all clots and debris. At this time, Dr. Henry completed 

and assessment to assess for the colonic involvement of this mass. And a rectal 

bubble test was completed. No through and through injury to the bowel was 

ascertained. Dr. Henry completed as serosal repair of these bowel 

involvement with excellent hemostasis. Prior to incision patient received 

prophylactic antibiotics prior to closure sponge lap and needle counts were 

correct 2. Due to severe anemia and possible comorbidities decision was made 

to transfuse the patient with packed red blood cells. The abdominal incision 

was closed in multiple layers and the skin was closed with staples. Pressure 

dressing was applied to the incision and the patient was taken to recovery room 

in stable condition.  This was a surgically challenging procedure due to 

destructive nature of this left adnexal mass as well as the abscess-like 

formation within the endometrial cavity causing a uterine perforation and 

likely causing the severe leukocytosis and sepsis prior to the procedure.


Estimated Blood Loss: 200cc


Blood Replaced: Packed red blood cells


Sponge/Instrument Count: Sponge lap and needle counts were correct 2


Complications: None


Specimen: Uterus cervix tubes and ovaries as well as left adnexal mass


Discharge & Condition: Patient was taken to recovery room and then to the ICU 

for further observation following this procedure

## 2017-11-03 LAB
ALBUMIN/GLOB SERPL: 0.9 {RATIO} (ref 1–2.1)
ALP SERPL-CCNC: 48 U/L (ref 38–126)
ALT SERPL-CCNC: 33 U/L (ref 9–52)
AST SERPL-CCNC: 33 U/L (ref 14–36)
BASOPHILS # BLD AUTO: 0 K/UL (ref 0–0.2)
BASOPHILS # BLD AUTO: 0 K/UL (ref 0–0.2)
BASOPHILS NFR BLD: 0.3 % (ref 0–2)
BASOPHILS NFR BLD: 0.3 % (ref 0–2)
BILIRUB SERPL-MCNC: 0.6 MG/DL (ref 0.2–1.3)
BUN SERPL-MCNC: 13 MG/DL (ref 7–17)
BUN SERPL-MCNC: 15 MG/DL (ref 7–17)
CALCIUM SERPL-MCNC: 7.2 MG/DL (ref 8.6–10.4)
CALCIUM SERPL-MCNC: 7.3 MG/DL (ref 8.6–10.4)
CHLORIDE SERPL-SCNC: 96 MMOL/L (ref 98–107)
CHLORIDE SERPL-SCNC: 97 MMOL/L (ref 98–107)
CO2 SERPL-SCNC: 29 MMOL/L (ref 22–30)
CO2 SERPL-SCNC: 29 MMOL/L (ref 22–30)
EOSINOPHIL # BLD AUTO: 0 K/UL (ref 0–0.7)
EOSINOPHIL # BLD AUTO: 0 K/UL (ref 0–0.7)
EOSINOPHIL NFR BLD: 0.7 % (ref 0–4)
EOSINOPHIL NFR BLD: 0.8 % (ref 0–4)
EOSINOPHIL NFR BLD: 2 % (ref 0–4)
ERYTHROCYTE [DISTWIDTH] IN BLOOD BY AUTOMATED COUNT: 13.7 % (ref 11.5–14.5)
ERYTHROCYTE [DISTWIDTH] IN BLOOD BY AUTOMATED COUNT: 13.7 % (ref 11.5–14.5)
GLOBULIN SER-MCNC: 2.6 GM/DL (ref 2.2–3.9)
GLUCOSE SERPL-MCNC: 115 MG/DL (ref 65–105)
GLUCOSE SERPL-MCNC: 129 MG/DL (ref 65–105)
HCT VFR BLD CALC: 27.2 % (ref 34–47)
HCT VFR BLD CALC: 27.8 % (ref 34–47)
LYMPHOCYTES # BLD AUTO: 0.5 K/UL (ref 1–4.3)
LYMPHOCYTES # BLD AUTO: 0.5 K/UL (ref 1–4.3)
LYMPHOCYTES NFR BLD AUTO: 10.5 % (ref 20–40)
LYMPHOCYTES NFR BLD AUTO: 9.2 % (ref 20–40)
MAGNESIUM SERPL-MCNC: 1.4 MG/DL (ref 1.6–2.3)
MAGNESIUM SERPL-MCNC: 1.9 MG/DL (ref 1.6–2.3)
MCH RBC QN AUTO: 29.9 PG (ref 27–31)
MCH RBC QN AUTO: 30.2 PG (ref 27–31)
MCHC RBC AUTO-ENTMCNC: 33.4 G/DL (ref 33–37)
MCHC RBC AUTO-ENTMCNC: 34 G/DL (ref 33–37)
MCV RBC AUTO: 88.8 FL (ref 81–99)
MCV RBC AUTO: 89.5 FL (ref 81–99)
MONOCYTES # BLD: 0.1 K/UL (ref 0–0.8)
MONOCYTES # BLD: 0.1 K/UL (ref 0–0.8)
MONOCYTES NFR BLD: 1 % (ref 0–10)
MONOCYTES NFR BLD: 1.4 % (ref 0–10)
NEUTROPHILS NFR BLD AUTO: 77 % (ref 50–75)
NRBC BLD AUTO-RTO: 0 % (ref 0–2)
NRBC BLD AUTO-RTO: 0 % (ref 0–2)
PHOSPHATE SERPL-MCNC: 2.7 MG/DL (ref 2.5–4.5)
PHOSPHATE SERPL-MCNC: 2.7 MG/DL (ref 2.5–4.5)
PLATELET # BLD: 135 K/UL (ref 130–400)
PLATELET # BLD: 144 K/UL (ref 130–400)
PMV BLD AUTO: 8.6 FL (ref 7.2–11.7)
PMV BLD AUTO: 8.8 FL (ref 7.2–11.7)
POTASSIUM SERPL-SCNC: 2.9 MMOL/L (ref 3.6–5.2)
POTASSIUM SERPL-SCNC: 3.3 MMOL/L (ref 3.6–5.2)
PROT SERPL-MCNC: 4.8 G/DL (ref 6.3–8.3)
SODIUM SERPL-SCNC: 130 MMOL/L (ref 132–148)
SODIUM SERPL-SCNC: 130 MMOL/L (ref 132–148)
TOTAL CELLS COUNTED BLD: 100
WBC # BLD AUTO: 4.5 K/UL (ref 4.8–10.8)
WBC # BLD AUTO: 5.8 K/UL (ref 4.8–10.8)

## 2017-11-03 RX ADMIN — CALCIUM CARBONATE-VITAMIN D TAB 500 MG-200 UNIT SCH TAB: 500-200 TAB at 15:03

## 2017-11-03 RX ADMIN — SODIUM CHLORIDE SCH MLS/HR: 0.9 INJECTION, SOLUTION INTRAVENOUS at 17:18

## 2017-11-03 RX ADMIN — CALCIUM CARBONATE-VITAMIN D TAB 500 MG-200 UNIT SCH TAB: 500-200 TAB at 17:18

## 2017-11-03 RX ADMIN — ALBUTEROL SULFATE SCH: 2.5 SOLUTION RESPIRATORY (INHALATION) at 00:56

## 2017-11-03 RX ADMIN — HUMAN INSULIN SCH: 100 INJECTION, SOLUTION SUBCUTANEOUS at 17:37

## 2017-11-03 RX ADMIN — ENOXAPARIN SODIUM SCH MG: 60 INJECTION SUBCUTANEOUS at 12:38

## 2017-11-03 RX ADMIN — SODIUM CHLORIDE SCH MLS/HR: 0.9 INJECTION, SOLUTION INTRAVENOUS at 00:38

## 2017-11-03 RX ADMIN — HUMAN INSULIN SCH: 100 INJECTION, SOLUTION SUBCUTANEOUS at 07:51

## 2017-11-03 RX ADMIN — CALCIUM CARBONATE-VITAMIN D TAB 500 MG-200 UNIT SCH: 500-200 TAB at 12:31

## 2017-11-03 RX ADMIN — ALBUTEROL SULFATE SCH MG: 2.5 SOLUTION RESPIRATORY (INHALATION) at 12:55

## 2017-11-03 RX ADMIN — ALBUTEROL SULFATE SCH MG: 2.5 SOLUTION RESPIRATORY (INHALATION) at 20:21

## 2017-11-03 RX ADMIN — ALBUTEROL SULFATE SCH: 2.5 SOLUTION RESPIRATORY (INHALATION) at 23:47

## 2017-11-03 RX ADMIN — ALBUTEROL SULFATE SCH: 2.5 SOLUTION RESPIRATORY (INHALATION) at 04:12

## 2017-11-03 RX ADMIN — HUMAN INSULIN SCH: 100 INJECTION, SOLUTION SUBCUTANEOUS at 21:57

## 2017-11-03 RX ADMIN — POTASSIUM CHLORIDE SCH: 1.5 SOLUTION ORAL at 10:00

## 2017-11-03 RX ADMIN — ALBUTEROL SULFATE SCH MG: 2.5 SOLUTION RESPIRATORY (INHALATION) at 07:30

## 2017-11-03 RX ADMIN — ALBUTEROL SULFATE SCH MG: 2.5 SOLUTION RESPIRATORY (INHALATION) at 17:16

## 2017-11-03 RX ADMIN — HUMAN INSULIN SCH: 100 INJECTION, SOLUTION SUBCUTANEOUS at 12:30

## 2017-11-03 RX ADMIN — POTASSIUM CHLORIDE SCH MEQ: 1.5 SOLUTION ORAL at 14:06

## 2017-11-03 RX ADMIN — SODIUM CHLORIDE SCH MLS/HR: 0.9 INJECTION, SOLUTION INTRAVENOUS at 08:12

## 2017-11-03 RX ADMIN — POTASSIUM CHLORIDE SCH MEQ: 1.5 SOLUTION ORAL at 17:18

## 2017-11-03 RX ADMIN — ENOXAPARIN SODIUM SCH MG: 60 INJECTION SUBCUTANEOUS at 22:01

## 2017-11-03 NOTE — CP.PCM.PN
Subjective





- Date & Time of Evaluation


Date of Evaluation: 11/03/17


Time of Evaluation: 07:00





- Subjective


Subjective: 





events noted


iv rx in progress


prognosis guarded 





Objective





- Vital Signs/Intake and Output


Vital Signs (last 24 hours): 


 











Temp Pulse Resp BP Pulse Ox


 


 97.2 F L  111 H  20   107/71   99 


 


 11/03/17 09:31  11/03/17 13:22  11/03/17 09:31  11/03/17 12:41  11/03/17 09:31








Intake and Output: 


 











 11/03/17 11/03/17





 06:59 18:59


 


Intake Total 200 230


 


Output Total 350 1050


 


Balance -150 -820














- Medications


Medications: 


 Current Medications





Acetaminophen (Tylenol 325mg Tab)  325 mg PO Q4 PRN


   PRN Reason: Fever >100.4 F


Albuterol Sulfate (Albuterol 0.083% Inhal Sol (2.5 Mg/3 Ml) Ud)  2.5 mg IH RQ4 

[facility]


   Last Admin: 11/03/17 12:55 Dose:  2.5 mg


Aspirin (Ecotrin)  81 mg PO DAILY [facility]


   Last Admin: 11/03/17 14:06 Dose:  81 mg


Calcium/Vitamin D (Oyster Shell Calcium/Vitamin D 500 Mg-200 Iu)  1 tab PO TID 

[facility]


   Last Admin: 11/03/17 12:31 Dose:  Not Given


Dronabinol (Marinol)  2.5 mg PO BID [facility]


   Last Admin: 11/03/17 10:00 Dose:  Not Given


Enoxaparin Sodium (Lovenox)  50 mg SC Q12H [facility]


   Last Admin: 11/03/17 12:38 Dose:  50 mg


Furosemide (Lasix)  20 mg IVP DAILY [facility]


   Last Admin: 11/03/17 12:41 Dose:  20 mg


Hydromorphone HCl (Dilaudid)  0.5 mg IVP Q4H PRN


   PRN Reason: Pain, moderate (4-7)


   Last Admin: 11/01/17 18:00 Dose:  0.5 mg


Meropenem 500 mg/ Dextrose  100 mls @ 100 mls/hr IVPB Q8H [facility]


   Last Admin: 11/03/17 08:12 Dose:  100 mls/hr


Insulin Human Regular (Novolin R)  0 unit SC ACHS [facility]


   PRN Reason: Protocol


   Last Admin: 11/03/17 12:30 Dose:  Not Given


Lactulose (Enulose)  20 gm PO Q6H [facility]


   Last Admin: 11/03/17 14:06 Dose:  20 gm


Metoclopramide HCl (Reglan)  5 mg IVP BID [facility]


   Last Admin: 11/03/17 12:39 Dose:  5 mg


Ondansetron HCl (Zofran Inj)  4 mg IVP Q4 PRN


   PRN Reason: Nausea/Vomiting


Pantoprazole Sodium (Protonix Inj)  40 mg IVP DAILY [facility]


   Last Admin: 11/03/17 12:39 Dose:  40 mg


Potassium Chloride (Potassium Chloride Oral Soln)  20 meq PO BID [facility]


   Last Admin: 11/03/17 14:06 Dose:  20 meq


Rosuvastatin Calcium (Crestor)  5 mg PO HS [facility]


   Last Admin: 11/02/17 22:25 Dose:  5 mg











- Labs


Labs: 


 





 11/03/17 13:53 





 11/03/17 13:53 





 











PT  26.3 SECONDS (9.7-12.2)  H  11/02/17  16:04    


 


INR  2.3   11/02/17  16:04    


 


APTT  50 SECONDS (21-34)  H  11/02/17  16:04    














- Constitutional


Appears: Non-toxic, Cachectic, Chronically Ill





- Head Exam


Head Exam: NORMOCEPHALIC





- Eye Exam


Eye Exam: absent: Scleral icterus





- ENT Exam


ENT Exam: Mucous Membranes Dry





- Neck Exam


Neck Exam: absent: Lymphadenopathy





- Respiratory Exam


Respiratory Exam: Decreased Breath Sounds





- Cardiovascular Exam


Cardiovascular Exam: REGULAR RHYTHM





- GI/Abdominal Exam


GI & Abdominal Exam: Distended





Assessment and Plan


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


Status: Chronic   





(2) Diabetes mellitus


Status: Chronic   





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


Status: Chronic

## 2017-11-03 NOTE — CP.PCM.PN
Subjective





- Date & Time of Evaluation


Date of Evaluation: 11/03/17


Time of Evaluation: 07:00





- Subjective


Subjective: 


SURGERY PROGRESS NOTE FOR DR. GARNER





Patient seen and examined at bedside. Yesterday, patient had new onset right 

facial droop and change in mental status from baseline (patient had previous 

stroke). Code stroke was called. Head CT was negative for bleed. It was decided 

by code stroke team and neurologist not to give tpa due to patient's recent 

surgery. Ambrosio was reinserted by medicine team and NG tube was placed for PO 

meds and concern for aspiration.


Today, patient's facial droop appears to be resolved.





Objective





- Vital Signs/Intake and Output


Vital Signs (last 24 hours): 


 











Temp Pulse Resp BP Pulse Ox


 


 97.6 F   107 H  20   101/69   99 


 


 11/03/17 15:23  11/03/17 15:23  11/03/17 15:23  11/03/17 15:23  11/03/17 15:23








Intake and Output: 


 











 11/03/17 11/03/17





 06:59 18:59


 


Intake Total 200 230


 


Output Total 350 1050


 


Balance -150 -820














- Medications


Medications: 


 Current Medications





Acetaminophen (Tylenol 325mg Tab)  325 mg PO Q4 PRN


   PRN Reason: Fever >100.4 F


Albuterol Sulfate (Albuterol 0.083% Inhal Sol (2.5 Mg/3 Ml) Ud)  2.5 mg IH RQ4 

[facility]


   Last Admin: 11/03/17 17:16 Dose:  2.5 mg


Aspirin (Ecotrin)  81 mg PO DAILY [facility]


   Last Admin: 11/03/17 14:06 Dose:  81 mg


Calcium/Vitamin D (Oyster Shell Calcium/Vitamin D 500 Mg-200 Iu)  1 tab PO TID 

[facility]


   Last Admin: 11/03/17 17:18 Dose:  1 tab


Dronabinol (Marinol)  2.5 mg PO BID [facility]


   Last Admin: 11/03/17 17:22 Dose:  2.5 mg


Enoxaparin Sodium (Lovenox)  50 mg SC Q12H [facility]


   Last Admin: 11/03/17 12:38 Dose:  50 mg


Furosemide (Lasix)  20 mg IVP DAILY [facility]


   Last Admin: 11/03/17 12:41 Dose:  20 mg


Hydromorphone HCl (Dilaudid)  0.5 mg IVP Q4H PRN


   PRN Reason: Pain, moderate (4-7)


   Last Admin: 11/01/17 18:00 Dose:  0.5 mg


Meropenem 500 mg/ Dextrose  100 mls @ 100 mls/hr IVPB Q8H [facility]


   Last Admin: 11/03/17 17:18 Dose:  100 mls/hr


Potassium Chloride (Potassium Chloride 20 Meq/100 Ml)  20 meq in 100 mls @ 50 

mls/hr IVPB Q2 [facility]


   Stop: 11/03/17 19:59


   Last Admin: 11/03/17 17:03 Dose:  50 mls/hr


Insulin Human Regular (Novolin R)  0 unit SC ACHS [facility]


   PRN Reason: Protocol


   Last Admin: 11/03/17 17:37 Dose:  Not Given


Lactulose (Enulose)  20 gm PO Q6H [facility]


   Last Admin: 11/03/17 14:06 Dose:  20 gm


Metoclopramide HCl (Reglan)  5 mg IVP BID [facility]


   Last Admin: 11/03/17 17:18 Dose:  5 mg


Ondansetron HCl (Zofran Inj)  4 mg IVP Q4 PRN


   PRN Reason: Nausea/Vomiting


Pantoprazole Sodium (Protonix Inj)  40 mg IVP DAILY [facility]


   Last Admin: 11/03/17 12:39 Dose:  40 mg


Potassium Chloride (Potassium Chloride Oral Soln)  20 meq PO BID [facility]


   Last Admin: 11/03/17 17:18 Dose:  20 meq


Rosuvastatin Calcium (Crestor)  5 mg PO HS [facility]


   Last Admin: 11/02/17 22:25 Dose:  5 mg











- Labs


Labs: 


 





 11/03/17 13:53 





 11/03/17 13:53 





 











PT  26.3 SECONDS (9.7-12.2)  H  11/02/17  16:04    


 


INR  2.3   11/02/17  16:04    


 


APTT  50 SECONDS (21-34)  H  11/02/17  16:04    














- Constitutional


Appears: Non-toxic, No Acute Distress





- Head Exam


Head Exam: ATRAUMATIC





- Respiratory Exam


Respiratory Exam: NORMAL BREATHING PATTERN.  absent: Respiratory Distress





- Cardiovascular Exam


Cardiovascular Exam: +S1, +S2





- GI/Abdominal Exam


GI & Abdominal Exam: Soft, Tenderness (tender LLQ).  absent: Distended, Firm, 

Guarding, Rigid, Rebound


Additional comments: 


staples in place (stainless steel)


Ambrosio in place





Assessment and Plan





- Assessment and Plan (Free Text)


Assessment: 


77 y/o F w/ uterine infection, L adenexal mass POD#8 s/p KASSY BSO





- Stroke being worked up by primary team with MRI, carotid doppler, EEG


- Elevated troponins, evaluated by cardiology, unlikely ACS


- Taqueria drain and Ambrosio removed yesterday but Ambrosio was reinserted after code 

stroke


- Will keep staples in place for 14 days post op


- Cont abx per ID


- Replete electrolytes PRN, Potassium and Mg replaced by medicine team


- Encouraged OOB to chair/IS use


- GI/DVT PPx





Plan discussed w/ Dr. Radha Eaton PGY-3

## 2017-11-03 NOTE — CP.PCM.PN
Subjective





- Date & Time of Evaluation


Date of Evaluation: 11/03/17


Time of Evaluation: 15:17





- Subjective


Subjective: 





No new complaints. NGT inserted for meds?


Speech therapist advised me that she was uncooperative with swallowing 

evaluation. 


Still unclear when patients PEG was removed as she was consuming pureed/

dysphagia type diet at NH prior to this admission.





Objective





- Vital Signs/Intake and Output


Vital Signs (last 24 hours): 


 











Temp Pulse Resp BP Pulse Ox


 


 97.2 F L  111 H  20   107/71   99 


 


 11/03/17 09:31  11/03/17 13:22  11/03/17 09:31  11/03/17 12:41  11/03/17 09:31








Intake and Output: 


 











 11/03/17 11/03/17





 06:59 18:59


 


Intake Total 200 230


 


Output Total 350 1050


 


Balance -150 -820














- Medications


Medications: 


 Current Medications





Acetaminophen (Tylenol 325mg Tab)  325 mg PO Q4 PRN


   PRN Reason: Fever >100.4 F


Albuterol Sulfate (Albuterol 0.083% Inhal Sol (2.5 Mg/3 Ml) Ud)  2.5 mg IH RQ4 

[facility]


   Last Admin: 11/03/17 12:55 Dose:  2.5 mg


Aspirin (Ecotrin)  81 mg PO DAILY [facility]


   Last Admin: 11/03/17 14:06 Dose:  81 mg


Calcium/Vitamin D (Oyster Shell Calcium/Vitamin D 500 Mg-200 Iu)  1 tab PO TID 

[facility]


   Last Admin: 11/03/17 15:03 Dose:  1 tab


Dronabinol (Marinol)  2.5 mg PO BID [facility]


   Last Admin: 11/03/17 10:00 Dose:  Not Given


Enoxaparin Sodium (Lovenox)  50 mg SC Q12H [facility]


   Last Admin: 11/03/17 12:38 Dose:  50 mg


Furosemide (Lasix)  20 mg IVP DAILY [facility]


   Last Admin: 11/03/17 12:41 Dose:  20 mg


Hydromorphone HCl (Dilaudid)  0.5 mg IVP Q4H PRN


   PRN Reason: Pain, moderate (4-7)


   Last Admin: 11/01/17 18:00 Dose:  0.5 mg


Meropenem 500 mg/ Dextrose  100 mls @ 100 mls/hr IVPB Q8H [facility]


   Last Admin: 11/03/17 08:12 Dose:  100 mls/hr


Potassium Chloride (Potassium Chloride 20 Meq/100 Ml)  20 meq in 100 mls @ 50 

mls/hr IVPB Q2 [facility]


   Stop: 11/03/17 19:59


Insulin Human Regular (Novolin R)  0 unit SC ACHS [facility]


   PRN Reason: Protocol


   Last Admin: 11/03/17 12:30 Dose:  Not Given


Lactulose (Enulose)  20 gm PO Q6H [facility]


   Last Admin: 11/03/17 14:06 Dose:  20 gm


Metoclopramide HCl (Reglan)  5 mg IVP BID [facility]


   Last Admin: 11/03/17 12:39 Dose:  5 mg


Ondansetron HCl (Zofran Inj)  4 mg IVP Q4 PRN


   PRN Reason: Nausea/Vomiting


Pantoprazole Sodium (Protonix Inj)  40 mg IVP DAILY [facility]


   Last Admin: 11/03/17 12:39 Dose:  40 mg


Potassium Chloride (Potassium Chloride Oral Soln)  20 meq PO BID [facility]


   Last Admin: 11/03/17 14:06 Dose:  20 meq


Rosuvastatin Calcium (Crestor)  5 mg PO HS [facility]


   Last Admin: 11/02/17 22:25 Dose:  5 mg











- Labs


Labs: 


 





 11/03/17 13:53 





 11/03/17 13:53 





 











PT  26.3 SECONDS (9.7-12.2)  H  11/02/17  16:04    


 


INR  2.3   11/02/17  16:04    


 


APTT  50 SECONDS (21-34)  H  11/02/17  16:04    














- Constitutional


Appears: No Acute Distress





- Head Exam


Head Exam: ATRAUMATIC, NORMOCEPHALIC





- Respiratory Exam


Respiratory Exam: NORMAL BREATHING PATTERN





- Cardiovascular Exam


Cardiovascular Exam: REGULAR RHYTHM





- GI/Abdominal Exam


GI & Abdominal Exam: Soft, Diminished Bowel Sounds.  absent: Tenderness, Mass, 

Rebound


Additional comments: 





midline incision intact





- Extremities Exam


Extremities Exam: Normal Inspection





Assessment and Plan


(1) Fecal impaction


Status: Resolved   





(2) Malnutrition following gastrointestinal surgery


Assessment & Plan: 


Awaiting swallowing evaluation when patient is willing to participate. If PEG 

to be inserted for voluntary refusal to eat the decision would need to be made 

by the family and prognosis issues discussed. Dr Walsh describes prognosis as 

guarded. 


Would begin feedings via NGT while it is inserted for now. 


Status: Chronic   





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


Status: Chronic   





(4) Dysphagia as late effect of cerebrovascular accident (CVA)


Assessment & Plan: 


Awaiting recommendations from swallow evaluation to be repeated on Monday. 


Status: Chronic

## 2017-11-03 NOTE — PN
NEUROLOGY PROGRESS NOTE



SUBJECTIVE:  The patient is lying on the bed, in no acute distress.  Denies

having any headache or dizziness.



PHYSICAL EXAMINATION:

VITAL SIGNS:  Her blood pressure is 103/66, heart rate 110 per minute,

breathing at the rate of 16 per minute and temperature is 97.2 degree

Fahrenheit.

HEENT:  Normocephalic and atraumatic.

NECK:  Supple.  There are no carotid bruits.

LUNGS:  Clear.

CARDIOVASCULAR SYSTEM:  S1 and S2 audible.  No murmurs.

ABDOMEN:  Soft and nontender.  Bowel sounds are present.

NEUROLOGY:  Mental status:  The patient is awake, alert.  She follows

simple commands.  Cranial nerve examination:  Pupils 3 mm bilaterally,

reactive to light.  Visual fields are full.  Extraocular movements are

intact.  There is mild decreased nasolabial fold on the right side.  Motor

examination:  Tone is slightly increased on the right side.  She has

right-sided hemiplegia, which is old; left side, she is moving

spontaneously.  Plantars are upgoing on the right and downgoing on the left

side.



LABORATORY DATA:  Reviewed, show WBC of 5.8, hemoglobin 9.1, hematocrit

27.2, and platelets of 135.  Her sodium is 130, potassium 3.3, chloride of

97, carbon dioxide content 29, BUN of 15, creatinine 0.8 and glucose of

115.



IMPRESSION:

1.  Status post new right-sided facial paresis, which appears to be

resolving.  This may have been secondary to a new cerebrovascular accident

versus possible transient ischemic attack.

2.  History of old cerebrovascular accident with residual right-sided

weakness.



RECOMMENDATIONS:

1.  The patient to have MRI of the brain without contrast.

2.  The patient also to have a carotid Doppler study.

3.  The patient had an electroencephalogram done, which is normal.

4.  The patient to be continued on aspirin.

5.  The patient also to be continued on statin.

6.  Please continue other treatment and supportive care.



Thank you for the opportunity to participate in the care of this patient.







__________________________________________

John Kinney MD





DD:  11/03/2017 11:09:30

DT:  11/03/2017 11:49:05

Job # [id number]

## 2017-11-03 NOTE — CARD
--------------- APPROVED REPORT --------------





EKG Measurement

Heart [id number]

VT 100P-7

[id number]

[id number]

[id number]



<Conclusion>

Sinus tachycardia with short VT

ST & T wave abnormality, consider inferior ischemia

ST & T wave abnormality, consider anterolateral ischemia

Prolonged QT

Abnormal ECG

## 2017-11-03 NOTE — RAD
HISTORY:

Pulmonary edema



COMPARISON:

10/29/2017 



FINDINGS:



LUNGS:

NG tube extending into the stomach. Right PICC line extending to the 

cavoatrial junction.  Biapical pleural thickening with upper lobe 

granulomatous changes.  Mild venous congestion.  Bilateral hilar 

prominence.



PLEURA:

No significant pleural effusion identified, no pneumothorax apparent.



CARDIOVASCULAR:

Normal.



OSSEOUS STRUCTURES:

No significant abnormalities.



VISUALIZED UPPER ABDOMEN:

Normal.



OTHER FINDINGS:

None.



IMPRESSION:





NG tube extending into the stomach. Right PICC line extending to the 

cavoatrial junction.  Biapical pleural thickening with upper lobe 

granulomatous changes.  Mild venous congestion.  Bilateral hilar 

prominence.

## 2017-11-03 NOTE — CON
NEUROLOGY CONSULTATION



DATE:



REASON FOR CONSULTATION:  Code stroke.



HISTORY OF PRESENT ILLNESS:  The patient is a 76-year-old female who has

been asked for evaluation of facial droop.  The patient has a history of

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  This afternoon, the

patient was noted to have some facial drooping.  As per daughter, her face

was symmetrical before.  She is able to speak normally and follows all

simple commands.  Denies having any headache or dizziness.  Denies any

difficulty with swallowing.



PAST MEDICAL HISTORY:  Includes cerebrovascular accident with residual

right-sided weakness, hypercholesterolemia and hypertension.



CURRENT MEDICATIONS:  Include albuterol, bupivacaine, aspirin was given

earlier, hydromorphone, lactulose, heparin, Marinol, _____, insulin,

potassium chloride, and vancomycin.



ALLERGIES:  NO KNOWN DRUG ALLERGIES.



SOCIAL HISTORY:  The patient is a nonsmoker and nonalcoholic.  Does not use

any illicit drugs.



FAMILY HISTORY:  Reviewed and noncontributory to the case.



REVIEW OF SYSTEMS:  Denies any headache, dizziness, chest pain, shortness

of breath, abdominal pain, constipation, or diarrhea.



PHYSICAL EXAMINATION:

GENERAL:  The patient is an elderly female, lying on the bed in no acute

distress.

VITAL SIGNS:  Her blood pressure is 124/76, heart rate is 134 per minute,

breathing at the rate of 16 per minute, and temperature is 98.9 degrees

Fahrenheit.

HEENT:  Head is normocephalic and atraumatic.

NECK:  Supple.  There are no carotid bruit.

LUNGS:  Clear.

CARDIOVASCULAR:  S1 and S2 audible.  No murmurs.

ABDOMEN:  Soft and nontender.  Bowel sounds are present.

NEUROLOGIC:  Mental status:  The patient is awake and alert.  She follows

all simple commands.  Naming is good.  Cranial nerve examination:  Pupils;

3 mm bilaterally reactive to light.  Visual fields are full.  Extraocular

movements are intact.  There is decrease nasolabial fold on the right side.

Tongue is midline.  Motor examination:  Tone is slightly increased on the

right side.  There is right-sided hemiplegia, which is old.  Power on the

left side is 4/5.  Reflexes are 1+ and symmetrical.  Plantars are upgoing

on the right and downgoing on the left side.  Gait is untestable.



LABORATORY DATA:  Reviewed and shows WBC of 9.3, hemoglobin of 10.5,

hematocrit 30.9, and platelets of 179.  INR is 2.3 and PTT is 50.  Sodium

is 132, potassium 3.8, chloride 99, carbon dioxide 25, BUN of 13,

creatinine of 0.7 and glucose of 114.  She had a CT scan of the head done,

which showed no evidence of acute intracranial hemorrhage.  Chronic

microvascular ischemic changes, age indeterminate infarcts within the right

frontal, right corona radiata, and left parietal regions.  Lacunar infarct

in the right forehead and basal ganglia.



IMPRESSION:

1.  New right facial weakness, possibly secondary to a new stroke.

2.  History of old cerebrovascular accident with residual right-sided

weakness.



RECOMMENDATIONS:

1.  The patient to have a MRI of the brain without contrast.

2.  The patient to have a carotid Doppler study.

3.  The patient also to have an electroencephalogram.

4.  The patient was given aspirin, I will continue her on aspirin everyday.

5.  The patient also to be started on statin.

6.  The patient to have Physical Therapy and Speech Therapy evaluation.

7.  The patient is not a candidate for TPA administration because of minor

new symptoms.  She had old cerebrovascular accident with right hemiplegia.

8.  Continue supportive care and other treatment.



Thank you for the opportunity to participate in the care of this patient.





__________________________________________

John Kinney MD



DD:  11/02/2017 18:17:11

DT:  11/02/2017 22:13:03

Job # [id number]

## 2017-11-03 NOTE — RAD
HISTORY:

NGT  



COMPARISON:

11/2/2017 



FINDINGS:



LUNGS:

No active pulmonary disease.



PLEURA:

Blunting of left costophrenic angle may reflect small pleural 

effusion.  No evidence of right pleural effusion. No pneumothorax.



CARDIOVASCULAR:

Normal heart size. No congestive change.  Nasogastric tube extends 

beneath the diaphragm.



OSSEOUS STRUCTURES:

No significant abnormalities.



VISUALIZED UPPER ABDOMEN:

Normal.



OTHER FINDINGS:

None.



IMPRESSION:

Nasogastric tube in grossly appropriate position. No infiltrate.  

Possible very small left pleural effusion.

## 2017-11-03 NOTE — CP.PCM.PN
Subjective





- Date & Time of Evaluation


Date of Evaluation: 11/03/17


Time of Evaluation: 20:54





- Subjective


Subjective: 





Pt seen and examined at bedside and all events and notes reviewed from all 

services. Pt appears to be at baseline again, and feeding started via NGT. Pt 

had PEG in the past and the last time she had it was around the 2nd quarter of 

2016. She had been able to eat po since then until this admission. Pt was able 

to feed and toilet herself as well. 





Objective





- Vital Signs/Intake and Output


Vital Signs (last 24 hours): 


 











Temp Pulse Resp BP Pulse Ox


 


 97.6 F   107 H  20   101/69   99 


 


 11/03/17 15:23  11/03/17 15:23  11/03/17 15:23  11/03/17 15:23  11/03/17 15:23








Intake and Output: 


 











 11/03/17 11/04/17





 18:59 06:59


 


Intake Total 230 


 


Output Total 1050 


 


Balance -820 














- Medications


Medications: 


 Current Medications





Acetaminophen (Tylenol 325mg Tab)  325 mg PO Q4 PRN


   PRN Reason: Fever >100.4 F


Albuterol Sulfate (Albuterol 0.083% Inhal Sol (2.5 Mg/3 Ml) Ud)  2.5 mg IH RQ4 

[facility]


   Last Admin: 11/03/17 20:21 Dose:  2.5 mg


Aspirin (Ecotrin)  81 mg PO DAILY [facility]


   Last Admin: 11/03/17 14:06 Dose:  81 mg


Calcium/Vitamin D (Oyster Shell Calcium/Vitamin D 500 Mg-200 Iu)  1 tab PO TID 

[facility]


   Last Admin: 11/03/17 17:18 Dose:  1 tab


Dronabinol (Marinol)  2.5 mg PO BID [facility]


   Last Admin: 11/03/17 17:22 Dose:  2.5 mg


Enoxaparin Sodium (Lovenox)  50 mg SC Q12H [facility]


   Last Admin: 11/03/17 12:38 Dose:  50 mg


Furosemide (Lasix)  20 mg IVP DAILY [facility]


   Last Admin: 11/03/17 12:41 Dose:  20 mg


Hydromorphone HCl (Dilaudid)  0.5 mg IVP Q4H PRN


   PRN Reason: Pain, moderate (4-7)


   Last Admin: 11/01/17 18:00 Dose:  0.5 mg


Meropenem 500 mg/ Dextrose  100 mls @ 100 mls/hr IVPB Q8H [facility]


   Last Admin: 11/03/17 17:18 Dose:  100 mls/hr


Potassium Phosphate 8.25 mmole (/ Sodium Chloride)  1,002.75 mls @ 50 mls/hr IV 

.Q20H4M [facility]


Insulin Human Regular (Novolin R)  0 unit SC ACHS [facility]


   PRN Reason: Protocol


   Last Admin: 11/03/17 17:37 Dose:  Not Given


Lactulose (Enulose)  20 gm PO Q6H [facility]


   Last Admin: 11/03/17 14:06 Dose:  20 gm


Ondansetron HCl (Zofran Inj)  4 mg IVP Q4 PRN


   PRN Reason: Nausea/Vomiting


Pantoprazole Sodium (Protonix Inj)  40 mg IVP DAILY [facility]


   Last Admin: 11/03/17 12:39 Dose:  40 mg


Potassium Chloride (Potassium Chloride Oral Soln)  20 meq PO BID [facility]


   Last Admin: 11/03/17 17:18 Dose:  20 meq


Rosuvastatin Calcium (Crestor)  5 mg PO HS [facility]


   Last Admin: 11/02/17 22:25 Dose:  5 mg











- Labs


Labs: 


 





 11/03/17 13:53 





 











PT  26.3 SECONDS (9.7-12.2)  H  11/02/17  16:04    


 


INR  2.3   11/02/17  16:04    


 


APTT  50 SECONDS (21-34)  H  11/02/17  16:04    














- Constitutional


Appears: No Acute Distress





- Head Exam


Head Exam: NORMAL INSPECTION, NORMOCEPHALIC





- Eye Exam


Eye Exam: Normal appearance


Pupil Exam: NORMAL ACCOMODATION





- ENT Exam


ENT Exam: Mucous Membranes Moist, Normal Exam





- Neck Exam


Neck Exam: Normal Inspection





- Respiratory Exam


Respiratory Exam: Clear to Ausculation Bilateral, NORMAL BREATHING PATTERN





- Cardiovascular Exam


Cardiovascular Exam: Tachycardia, REGULAR RHYTHM





- GI/Abdominal Exam


GI & Abdominal Exam: Soft, Normal Bowel Sounds





- Rectal Exam


Rectal Exam: Deferred





- Extremities Exam


Extremities Exam: Normal Inspection





- Back Exam


Back Exam: NORMAL INSPECTION





- Neurological Exam


Neurological Exam: Awake, CN II-XII Intact


Neuro motor strength exam: Left Upper Extremity: 4, Right Upper Extremity: 2/1, 

Left Lower Extremity: 4, Right Lower Extremity: 2/1





- Psychiatric Exam


Psychiatric exam: Depressed, Flat Affect





- Skin


Skin Exam: Dry, Intact, Normal Color, Warm





Assessment and Plan


(1) Endometritis


Assessment & Plan: 


improving


Status: Acute   





(2) Fecal impaction


Assessment & Plan: 


resolved


Status: Resolved   





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


Assessment & Plan: 


stable; no new signs of neurologic dysfunctioni; unable to obtain MRI ec of 

surgical staplesfrom abdominal surgery


Status: Chronic   





(4) Diabetes mellitus


Assessment & Plan: 


atable


Status: Chronic   





(5) Malnutrition following gastrointestinal surgery


Assessment & Plan: 


pt still refuses to eat


Status: Chronic   





(6) Bacteremia


Assessment & Plan: 


on IV abx


Status: Acute

## 2017-11-03 NOTE — CP.PCM.CON
History of Present Illness





- History of Present Illness


History of Present Illness: 





Cardiology Consult





Reason for consult: abnormal troponin





HPI:  Patient is a 75 yo F with hx of DM, HTN, HL, prior stroke s/p PEG who 

presented with abdominal pain secondary to enodmetritis status post total 

hysterectomy on 10/26/17.  Yesterday, patient developed a R facial droop which 

resolved on its own.  Brain imaging showed old strokes, but no acute infarcts.  

EKG showed sinus tach with anterolateral T-wave inversions.  Troponin was 

mildly elevated (0.44 --> 0.31).  Cardiology now called.  Pt denies any chest 

pain, SOB, palpitations, leg edema, syncope or presyncope.





PMH: as above


FHx: no PCAD


ROS: as above, otherwise negative


Meds: reviewed


All: reviewed








Past Patient History





- Infectious Disease


Hx of Infectious Diseases: None





- Tetanus Immunizations


Tetanus Immunization: Unknown





- Past Medical History & Family History


Past Medical History?: Yes





- Past Social History


Smoking Status: Never Smoked


Alcohol: None


Drugs: Other (pt was undiagnosed diabetic until after admission for the stroke 

and until pt assigned to me at sub acute rehab )


Domestic Violence: Negative





- CARDIAC


Hx Hypercholesterolemia:  (Hyperlipidimia)


Hx Hypertension: Yes





- PULMONARY


Hx Respiratory Disorders: No





- NEUROLOGICAL


HX Cerebrovascular Accident: Yes (RUE/RLE HEMIPLEGIA)





- HEENT


Other/Comment: DYSPHAGIA, PT HAS PEG





- RENAL


Hx Chronic Kidney Disease: No





- ENDOCRINE/METABOLIC


Hx Diabetes Mellitus Type 2: Yes





- HEMATOLOGICAL/ONCOLOGICAL


Hx Blood Disorders: No





- INTEGUMENTARY


Hx Dermatological Problems: No





- MUSCULOSKELETAL/RHEUMATOLOGICAL


Hx Arthritis: Yes





- GASTROINTESTINAL


Hx Gastrointestinal Disorders: Yes


Other/Comment: Prior PEG placement,  INCONTINENT OF STOOL





- GENITOURINARY/GYNECOLOGICAL


Hx Genitourinary Disorders: Yes


Other/Comment: INCONTINENT OF URINE,





- PSYCHIATRIC


Hx Psychophysiologic Disorder: No


Hx Substance Use: No





- SURGICAL HISTORY


Hx Surgeries: No





- ANESTHESIA


Hx Anesthesia: Yes





Meds


Allergies/Adverse Reactions: 


 Allergies











Allergy/AdvReac Type Severity Reaction Status Date / Time


 


No Known Allergies Allergy   Verified 10/24/17 06:20














- Medications


Medications: 


 Current Medications





Acetaminophen (Tylenol 325mg Tab)  325 mg PO Q4 PRN


   PRN Reason: Fever >100.4 F


Albuterol Sulfate (Albuterol 0.083% Inhal Sol (2.5 Mg/3 Ml) Ud)  2.5 mg IH RQ4 

[facility]


   Last Admin: 11/03/17 17:16 Dose:  2.5 mg


Aspirin (Ecotrin)  81 mg PO DAILY [facility]


   Last Admin: 11/03/17 14:06 Dose:  81 mg


Calcium/Vitamin D (Oyster Shell Calcium/Vitamin D 500 Mg-200 Iu)  1 tab PO TID 

[facility]


   Last Admin: 11/03/17 17:18 Dose:  1 tab


Dronabinol (Marinol)  2.5 mg PO BID [facility]


   Last Admin: 11/03/17 17:22 Dose:  2.5 mg


Enoxaparin Sodium (Lovenox)  50 mg SC Q12H [facility]


   Last Admin: 11/03/17 12:38 Dose:  50 mg


Furosemide (Lasix)  20 mg IVP DAILY [facility]


   Last Admin: 11/03/17 12:41 Dose:  20 mg


Hydromorphone HCl (Dilaudid)  0.5 mg IVP Q4H PRN


   PRN Reason: Pain, moderate (4-7)


   Last Admin: 11/01/17 18:00 Dose:  0.5 mg


Meropenem 500 mg/ Dextrose  100 mls @ 100 mls/hr IVPB Q8H [facility]


   Last Admin: 11/03/17 17:18 Dose:  100 mls/hr


Potassium Chloride (Potassium Chloride 20 Meq/100 Ml)  20 meq in 100 mls @ 50 

mls/hr IVPB Q2 [facility]


   Stop: 11/03/17 19:59


   Last Admin: 11/03/17 17:03 Dose:  50 mls/hr


Insulin Human Regular (Novolin R)  0 unit SC ACHS [facility]


   PRN Reason: Protocol


   Last Admin: 11/03/17 17:37 Dose:  Not Given


Lactulose (Enulose)  20 gm PO Q6H [facility]


   Last Admin: 11/03/17 14:06 Dose:  20 gm


Metoclopramide HCl (Reglan)  5 mg IVP BID [facility]


   Last Admin: 11/03/17 17:18 Dose:  5 mg


Ondansetron HCl (Zofran Inj)  4 mg IVP Q4 PRN


   PRN Reason: Nausea/Vomiting


Pantoprazole Sodium (Protonix Inj)  40 mg IVP DAILY [facility]


   Last Admin: 11/03/17 12:39 Dose:  40 mg


Potassium Chloride (Potassium Chloride Oral Soln)  20 meq PO BID [facility]


   Last Admin: 11/03/17 17:18 Dose:  20 meq


Rosuvastatin Calcium (Crestor)  5 mg PO HS [facility]


   Last Admin: 11/02/17 22:25 Dose:  5 mg











Physical Exam





- Constitutional


Appears: Well





- Head Exam


Head Exam: ATRAUMATIC





- Neck Exam


Neck exam: Positive for: Normal Inspection





- Respiratory Exam


Respiratory Exam: Clear to Auscultation Bilateral





- Cardiovascular Exam


Cardiovascular Exam: REGULAR RHYTHM, +S1, +S2





- GI/Abdominal Exam


GI & Abdominal Exam: Soft





- Skin


Skin Exam: Warm





Results





- Vital Signs


Recent Vital Signs: 


 Last Vital Signs











Temp  97.6 F   11/03/17 15:23


 


Pulse  107 H  11/03/17 15:23


 


Resp  20   11/03/17 15:23


 


BP  101/69   11/03/17 15:23


 


Pulse Ox  99   11/03/17 15:23














- Labs


Result Diagrams: 


 11/03/17 13:53





 11/03/17 13:53


Labs: 


 Laboratory Results - last 24 hr











  11/02/17 11/02/17 11/03/17





  19:47 19:47 00:23


 


WBC   


 


RBC   


 


Hgb   


 


Hct   


 


MCV   


 


MCH   


 


MCHC   


 


RDW   


 


Plt Count   


 


MPV   


 


Neut % (Auto)   


 


Lymph % (Auto)   


 


Mono % (Auto)   


 


Eos % (Auto)   


 


Baso % (Auto)   


 


Neut #   


 


Lymph #   


 


Mono #   


 


Eos #   


 


Baso #   


 


Neutrophils % (Manual)   


 


Band Neutrophils %   


 


Lymphocytes % (Manual)   


 


Monocytes % (Manual)   


 


Eosinophils % (Manual)   


 


Platelet Estimate   


 


Hypochromasia (manual)   


 


Basophilic Stippling   


 


Sodium   131 L 


 


Potassium   3.6 


 


Chloride   97 L 


 


Carbon Dioxide   26 


 


Anion Gap   12 


 


BUN   13 


 


Creatinine   0.9 


 


Est GFR ( Amer)   > 60 


 


Est GFR (Non-Af Amer)   > 60 


 


POC Glucose (mg/dL)    145 H


 


Random Glucose   130 H 


 


Calcium   7.1 L 


 


Phosphorus   


 


Magnesium   


 


Total Bilirubin   0.7 


 


AST   25 


 


ALT   34 


 


Alkaline Phosphatase   48 


 


Total Creatine Kinase  65  


 


CK-MB (Mass)  0.91  


 


Troponin I   0.7220 H* 


 


Troponin I, Quant   


 


NT-Pro-B Natriuret Pep   01373 H 


 


Total Protein   4.7 L 


 


Albumin   2.1 L 


 


Globulin   2.5 


 


Albumin/Globulin Ratio   0.8 L 














  11/03/17 11/03/17 11/03/17





  00:38 06:13 06:35


 


WBC    5.8


 


RBC    3.04 L


 


Hgb    9.1 L


 


Hct    27.2 L


 


MCV    89.5


 


MCH    29.9


 


MCHC    33.4


 


RDW    13.7


 


Plt Count    135


 


MPV    8.8


 


Neut % (Auto)    88.8 H


 


Lymph % (Auto)    9.2 L


 


Mono % (Auto)    1.0


 


Eos % (Auto)    0.7


 


Baso % (Auto)    0.3


 


Neut #    5.1


 


Lymph #    0.5 L


 


Mono #    0.1


 


Eos #    0.0


 


Baso #    0.0


 


Neutrophils % (Manual)    77 H


 


Band Neutrophils %    13 H*


 


Lymphocytes % (Manual)    8 L


 


Monocytes % (Manual)    TEST NOT PERFORMED


 


Eosinophils % (Manual)    2


 


Platelet Estimate    Normal


 


Hypochromasia (manual)    Slight


 


Basophilic Stippling    Slight


 


Sodium   


 


Potassium   


 


Chloride   


 


Carbon Dioxide   


 


Anion Gap   


 


BUN   


 


Creatinine   


 


Est GFR ( Amer)   


 


Est GFR (Non-Af Amer)   


 


POC Glucose (mg/dL)   136 H 


 


Random Glucose   


 


Calcium   


 


Phosphorus   


 


Magnesium   


 


Total Bilirubin   


 


AST   


 


ALT   


 


Alkaline Phosphatase   


 


Total Creatine Kinase  58  


 


CK-MB (Mass)  0.96  


 


Troponin I   


 


Troponin I, Quant  0.4420 H*  


 


NT-Pro-B Natriuret Pep   


 


Total Protein   


 


Albumin   


 


Globulin   


 


Albumin/Globulin Ratio   














  11/03/17 11/03/17 11/03/17





  06:35 06:35 12:14


 


WBC   


 


RBC   


 


Hgb   


 


Hct   


 


MCV   


 


MCH   


 


MCHC   


 


RDW   


 


Plt Count   


 


MPV   


 


Neut % (Auto)   


 


Lymph % (Auto)   


 


Mono % (Auto)   


 


Eos % (Auto)   


 


Baso % (Auto)   


 


Neut #   


 


Lymph #   


 


Mono #   


 


Eos #   


 


Baso #   


 


Neutrophils % (Manual)   


 


Band Neutrophils %   


 


Lymphocytes % (Manual)   


 


Monocytes % (Manual)   


 


Eosinophils % (Manual)   


 


Platelet Estimate   


 


Hypochromasia (manual)   


 


Basophilic Stippling   


 


Sodium  130 L  


 


Potassium  3.3 L  


 


Chloride  97 L  


 


Carbon Dioxide  29  


 


Anion Gap  7 L  


 


BUN  15  


 


Creatinine  0.8  


 


Est GFR ( Amer)  > 60  


 


Est GFR (Non-Af Amer)  > 60  


 


POC Glucose (mg/dL)    134 H


 


Random Glucose  115 H  


 


Calcium  7.2 L  


 


Phosphorus  2.7  2.7 


 


Magnesium  1.4 L  


 


Total Bilirubin   


 


AST   


 


ALT   


 


Alkaline Phosphatase   


 


Total Creatine Kinase   35 


 


CK-MB (Mass)   0.83 


 


Troponin I   Cancelled 


 


Troponin I, Quant   0.3110 H* 


 


NT-Pro-B Natriuret Pep   


 


Total Protein   


 


Albumin   


 


Globulin   


 


Albumin/Globulin Ratio   














  11/03/17 11/03/17 11/03/17





  13:53 13:53 17:21


 


WBC  4.5 L  


 


RBC  3.13 L  


 


Hgb  9.5 L  


 


Hct  27.8 L  


 


MCV  88.8  


 


MCH  30.2  


 


MCHC  34.0  


 


RDW  13.7  


 


Plt Count  144  


 


MPV  8.6  


 


Neut % (Auto)  87.0 H  


 


Lymph % (Auto)  10.5 L  


 


Mono % (Auto)  1.4  


 


Eos % (Auto)  0.8  


 


Baso % (Auto)  0.3  


 


Neut #  3.9  


 


Lymph #  0.5 L  


 


Mono #  0.1  


 


Eos #  0.0  


 


Baso #  0.0  


 


Neutrophils % (Manual)   


 


Band Neutrophils %   


 


Lymphocytes % (Manual)   


 


Monocytes % (Manual)   


 


Eosinophils % (Manual)   


 


Platelet Estimate   


 


Hypochromasia (manual)   


 


Basophilic Stippling   


 


Sodium   130 L 


 


Potassium   2.9 L 


 


Chloride   96 L 


 


Carbon Dioxide   29 


 


Anion Gap   8 L 


 


BUN   13 


 


Creatinine   0.7 


 


Est GFR ( Amer)   > 60 


 


Est GFR (Non-Af Amer)   > 60 


 


POC Glucose (mg/dL)    141 H


 


Random Glucose   129 H 


 


Calcium   7.3 L 


 


Phosphorus   


 


Magnesium   1.9 


 


Total Bilirubin   0.6 


 


AST   33 


 


ALT   33 


 


Alkaline Phosphatase   48 


 


Total Creatine Kinase   


 


CK-MB (Mass)   


 


Troponin I   


 


Troponin I, Quant   


 


NT-Pro-B Natriuret Pep   98930 H 


 


Total Protein   4.8 L 


 


Albumin   2.3 L 


 


Globulin   2.6 


 


Albumin/Globulin Ratio   0.9 L 














- Impressions


Impression: 





EKG: sinus tach at 108, anterolateral t-wave inversions








Assessment & Plan





- Assessment and Plan (Free Text)


Assessment: 





1.  Nonspecific Tn elevation (peak Tn 0.442) -- No signs of ACS.  PE ruled out 

by CTA chest.  Mild trop elevation may be due to TIA (recent R facial droop)


2.  Recent transient R facial droop -- ? TIA


3.  Recent total hysterectomy for endometritis





Plan: 





1.  Check echo to r/o LV dysfunction in setting of mild troponin elevation


2.  Cont current cardiac meds

## 2017-11-04 LAB
ALBUMIN/GLOB SERPL: 0.6 {RATIO} (ref 1–2.1)
ALBUMIN/GLOB SERPL: 0.6 {RATIO} (ref 1–2.1)
ALP SERPL-CCNC: 48 U/L (ref 38–126)
ALP SERPL-CCNC: 51 U/L (ref 38–126)
ALT SERPL-CCNC: 41 U/L (ref 9–52)
ALT SERPL-CCNC: 44 U/L (ref 9–52)
AST SERPL-CCNC: 34 U/L (ref 14–36)
AST SERPL-CCNC: 43 U/L (ref 14–36)
BASOPHILS # BLD AUTO: 0 K/UL (ref 0–0.2)
BASOPHILS NFR BLD: 0.2 % (ref 0–2)
BILIRUB SERPL-MCNC: 0.3 MG/DL (ref 0.2–1.3)
BILIRUB SERPL-MCNC: 0.4 MG/DL (ref 0.2–1.3)
BUN SERPL-MCNC: 12 MG/DL (ref 7–17)
BUN SERPL-MCNC: 13 MG/DL (ref 7–17)
CALCIUM SERPL-MCNC: 7.4 MG/DL (ref 8.6–10.4)
CALCIUM SERPL-MCNC: 7.5 MG/DL (ref 8.6–10.4)
CHLORIDE SERPL-SCNC: 100 MMOL/L (ref 98–107)
CHLORIDE SERPL-SCNC: 98 MMOL/L (ref 98–107)
CO2 SERPL-SCNC: 26 MMOL/L (ref 22–30)
CO2 SERPL-SCNC: 28 MMOL/L (ref 22–30)
EOSINOPHIL # BLD AUTO: 0 K/UL (ref 0–0.7)
EOSINOPHIL NFR BLD: 1 % (ref 0–4)
EOSINOPHIL NFR BLD: 2 % (ref 0–4)
ERYTHROCYTE [DISTWIDTH] IN BLOOD BY AUTOMATED COUNT: 14.1 % (ref 11.5–14.5)
GLOBULIN SER-MCNC: 3.5 GM/DL (ref 2.2–3.9)
GLOBULIN SER-MCNC: 3.7 GM/DL (ref 2.2–3.9)
GLUCOSE SERPL-MCNC: 138 MG/DL (ref 65–105)
GLUCOSE SERPL-MCNC: 143 MG/DL (ref 65–105)
HCT VFR BLD CALC: 28.7 % (ref 34–47)
LYMPHOCYTES # BLD AUTO: 0.4 K/UL (ref 1–4.3)
LYMPHOCYTES NFR BLD AUTO: 7.8 % (ref 20–40)
MAGNESIUM SERPL-MCNC: 1.7 MG/DL (ref 1.6–2.3)
MAGNESIUM SERPL-MCNC: 1.7 MG/DL (ref 1.6–2.3)
MCH RBC QN AUTO: 29.9 PG (ref 27–31)
MCHC RBC AUTO-ENTMCNC: 33.5 G/DL (ref 33–37)
MCV RBC AUTO: 89.2 FL (ref 81–99)
MONOCYTES # BLD: 0.1 K/UL (ref 0–0.8)
MONOCYTES NFR BLD: 1.8 % (ref 0–10)
NEUTROPHILS NFR BLD AUTO: 81 % (ref 50–75)
NRBC BLD AUTO-RTO: 0.1 % (ref 0–2)
PHOSPHATE SERPL-MCNC: 2.6 MG/DL (ref 2.5–4.5)
PLATELET # BLD: 157 K/UL (ref 130–400)
PMV BLD AUTO: 9 FL (ref 7.2–11.7)
POTASSIUM SERPL-SCNC: 3.8 MMOL/L (ref 3.6–5.2)
POTASSIUM SERPL-SCNC: 4.3 MMOL/L (ref 3.6–5.2)
PROT SERPL-MCNC: 5.6 G/DL (ref 6.3–8.3)
PROT SERPL-MCNC: 6 G/DL (ref 6.3–8.3)
SODIUM SERPL-SCNC: 128 MMOL/L (ref 132–148)
SODIUM SERPL-SCNC: 129 MMOL/L (ref 132–148)
TOTAL CELLS COUNTED BLD: 100
WBC # BLD AUTO: 5.1 K/UL (ref 4.8–10.8)

## 2017-11-04 RX ADMIN — ALBUTEROL SULFATE SCH MG: 2.5 SOLUTION RESPIRATORY (INHALATION) at 16:20

## 2017-11-04 RX ADMIN — HUMAN INSULIN SCH UNIT: 100 INJECTION, SOLUTION SUBCUTANEOUS at 17:30

## 2017-11-04 RX ADMIN — ALBUTEROL SULFATE SCH MG: 2.5 SOLUTION RESPIRATORY (INHALATION) at 20:35

## 2017-11-04 RX ADMIN — CALCIUM CARBONATE-VITAMIN D TAB 500 MG-200 UNIT SCH TAB: 500-200 TAB at 18:00

## 2017-11-04 RX ADMIN — ALBUTEROL SULFATE SCH MG: 2.5 SOLUTION RESPIRATORY (INHALATION) at 07:33

## 2017-11-04 RX ADMIN — CALCIUM CARBONATE-VITAMIN D TAB 500 MG-200 UNIT SCH TAB: 500-200 TAB at 09:01

## 2017-11-04 RX ADMIN — SODIUM CHLORIDE SCH: 0.9 INJECTION, SOLUTION INTRAVENOUS at 17:00

## 2017-11-04 RX ADMIN — POTASSIUM CHLORIDE SCH MEQ: 1.5 SOLUTION ORAL at 18:00

## 2017-11-04 RX ADMIN — HYDROMORPHONE HYDROCHLORIDE PRN MG: 1 INJECTION, SOLUTION INTRAMUSCULAR; INTRAVENOUS; SUBCUTANEOUS at 03:42

## 2017-11-04 RX ADMIN — SODIUM CHLORIDE SCH MLS/HR: 0.9 INJECTION, SOLUTION INTRAVENOUS at 16:13

## 2017-11-04 RX ADMIN — SODIUM CHLORIDE SCH MLS/HR: 0.9 INJECTION, SOLUTION INTRAVENOUS at 01:33

## 2017-11-04 RX ADMIN — ALBUTEROL SULFATE SCH MG: 2.5 SOLUTION RESPIRATORY (INHALATION) at 11:48

## 2017-11-04 RX ADMIN — CALCIUM CARBONATE-VITAMIN D TAB 500 MG-200 UNIT SCH TAB: 500-200 TAB at 14:31

## 2017-11-04 RX ADMIN — HUMAN INSULIN SCH UNIT: 100 INJECTION, SOLUTION SUBCUTANEOUS at 08:25

## 2017-11-04 RX ADMIN — HUMAN INSULIN SCH: 100 INJECTION, SOLUTION SUBCUTANEOUS at 22:43

## 2017-11-04 RX ADMIN — ALBUTEROL SULFATE SCH: 2.5 SOLUTION RESPIRATORY (INHALATION) at 03:05

## 2017-11-04 RX ADMIN — HUMAN INSULIN SCH UNIT: 100 INJECTION, SOLUTION SUBCUTANEOUS at 08:27

## 2017-11-04 RX ADMIN — HUMAN INSULIN SCH: 100 INJECTION, SOLUTION SUBCUTANEOUS at 12:14

## 2017-11-04 RX ADMIN — POTASSIUM CHLORIDE SCH MEQ: 1.5 SOLUTION ORAL at 08:59

## 2017-11-04 RX ADMIN — ENOXAPARIN SODIUM SCH MG: 60 INJECTION SUBCUTANEOUS at 08:59

## 2017-11-04 RX ADMIN — SODIUM CHLORIDE SCH MLS/HR: 0.9 INJECTION, SOLUTION INTRAVENOUS at 09:00

## 2017-11-04 RX ADMIN — POTASSIUM CHLORIDE SCH MEQ: 1.5 SOLUTION ORAL at 22:31

## 2017-11-04 RX ADMIN — ENOXAPARIN SODIUM SCH MG: 60 INJECTION SUBCUTANEOUS at 21:48

## 2017-11-04 NOTE — EEG
DATE:



INTRODUCTION:  This is a digitally recorded EEG monitoring using standard

EEG montages.



BACKGROUND RHYTHM:  The EEG shows a background activity of 7.5 to 8 Hz

activity in parieto-occipital region.  The EEG activity is bilaterally

symmetrical and synchronous.  There is attenuation of the background

activity on eye opening.  A small amount of myogenic artifact noticed in

this EEG recording.



ABNORMAL POTENTIALS:  No spike, sharp waves or focal slowing was seen.



PHOTIC STIMULATION AND HYPERVENTILATION:  Photic stimulation did not reveal

any abnormality.  Hyperventilation was not performed.



IMPRESSION:  Normal electroencephalogram.  No epileptiform activity seen in

this electroencephalogram recording.





__________________________________________

John Kinney MD





DD:  11/03/2017 10:54:27

DT:  11/03/2017 12:58:59

Job # [id number]

## 2017-11-04 NOTE — CP.PCM.PN
Subjective





- Date & Time of Evaluation


Date of Evaluation: 11/04/17


Time of Evaluation: 16:37





- Subjective


Subjective: 





Pt seen, labs reviewed, NGT running. Pt wins some, loses some. Na+ below 130C 

but asymptomatic. NGT feeds continuing. 


> Pt had several BMs since past admission including the one this morning. 


> Had left word with nursing that I need to talk to pt's family about pt's code 

status, which is currently a full code. If pt is to refuse all interventions 

and this type of behavior persists. I will have no other recourse except to 

make her DNR, DNI, DNH. Asked if pt will eato her own, pt shakes her head. i 

believe pt is depressed and is on a course of will ful self-destruction. Will 

obtain psychiatric consult for pt. 





Objective





- Vital Signs/Intake and Output


Vital Signs (last 24 hours): 


 











Temp Pulse Resp BP Pulse Ox


 


 98.3 F   116 H  20   114/73   100 


 


 11/04/17 15:13  11/04/17 15:13  11/04/17 15:13  11/04/17 15:13  11/04/17 15:13








Intake and Output: 


 











 11/04/17 11/04/17





 06:59 18:59


 


Intake Total 910 500


 


Output Total 1001 750


 


Balance -91 -250














- Medications


Medications: 


 Current Medications





Acetaminophen (Tylenol 325mg Tab)  325 mg PO Q4 PRN


   PRN Reason: Fever >100.4 F


Albuterol Sulfate (Albuterol 0.083% Inhal Sol (2.5 Mg/3 Ml) Ud)  2.5 mg IH RQ4 

Counts include 234 beds at the [facility]


   Last Admin: 11/04/17 16:20 Dose:  2.5 mg


Aspirin (Ecotrin)  81 mg PO DAILY Counts include 234 beds at the [facility]


   Last Admin: 11/04/17 09:00 Dose:  81 mg


Calcium/Vitamin D (Oyster Shell Calcium/Vitamin D 500 Mg-200 Iu)  1 tab PO TID 

Counts include 234 beds at the [facility]


   Last Admin: 11/04/17 14:31 Dose:  1 tab


Dronabinol (Marinol)  2.5 mg PO BID Counts include 234 beds at the [facility]


   Last Admin: 11/04/17 09:00 Dose:  2.5 mg


Enoxaparin Sodium (Lovenox)  50 mg SC Q12H Counts include 234 beds at the [facility]


   Last Admin: 11/04/17 08:59 Dose:  50 mg


Furosemide (Lasix)  20 mg IVP DAILY Counts include 234 beds at the [facility]


   Last Admin: 11/04/17 09:00 Dose:  20 mg


Hydromorphone HCl (Dilaudid)  0.5 mg IVP Q4H PRN


   PRN Reason: Pain, moderate (4-7)


   Last Admin: 11/04/17 03:42 Dose:  0.5 mg


Meropenem 500 mg/ Dextrose  100 mls @ 100 mls/hr IVPB Q8H Counts include 234 beds at the [facility]


   Last Admin: 11/04/17 16:13 Dose:  100 mls/hr


Potassium Phosphate 8.25 mmole (/ Sodium Chloride)  1,002.75 mls @ 50 mls/hr IV 

.Q20H4M Counts include 234 beds at the [facility]


   Last Admin: 11/03/17 22:00 Dose:  50 mls/hr


Insulin Human Regular (Novolin R)  0 unit SC ACHS Counts include 234 beds at the [facility]


   PRN Reason: Protocol


   Last Admin: 11/04/17 12:14 Dose:  Not Given


Lactulose (Enulose)  20 gm PO Q12H Counts include 234 beds at the [facility]


Metoclopramide HCl (Reglan)  5 mg IVP HS Counts include 234 beds at the [facility]


   Last Admin: 11/03/17 21:42 Dose:  Not Given


Ondansetron HCl (Zofran Inj)  4 mg IVP Q4 PRN


   PRN Reason: Nausea/Vomiting


Pantoprazole Sodium (Protonix Inj)  40 mg IVP DAILY Counts include 234 beds at the [facility]


   Last Admin: 11/04/17 09:00 Dose:  40 mg


Potassium Chloride (Potassium Chloride Oral Soln)  40 meq PO Q12 MANDIE


Rosuvastatin Calcium (Crestor)  5 mg PO HS Counts include 234 beds at the [facility]


   Last Admin: 11/03/17 22:01 Dose:  5 mg











- Labs


Labs: 


 





 11/04/17 14:23 





 11/04/17 14:23 





 











PT  26.3 SECONDS (9.7-12.2)  H  11/02/17  16:04    


 


INR  2.3   11/02/17  16:04    


 


APTT  50 SECONDS (21-34)  H  11/02/17  16:04    














- Constitutional


Appears: No Acute Distress (pt in bed, occasional talks to me, but often looks 

away)





- Head Exam


Head Exam: NORMAL INSPECTION





- Eye Exam


Eye Exam: Normal appearance


Pupil Exam: NORMAL ACCOMODATION





- ENT Exam


ENT Exam: Normal Exam





- Neck Exam


Neck Exam: Normal Inspection





- Respiratory Exam


Respiratory Exam: Clear to Ausculation Bilateral





- Cardiovascular Exam


Cardiovascular Exam: Tachycardia (low 100s), REGULAR RHYTHM





- GI/Abdominal Exam


GI & Abdominal Exam: Soft, Normal Bowel Sounds





- Rectal Exam


Rectal Exam: Deferred





- Extremities Exam


Extremities Exam: Normal Inspection





- Neurological Exam


Neurological Exam: Alert, Awake, CN II-XII Intact


Neuro motor strength exam: Left Upper Extremity: 4, Right Upper Extremity: 2/1, 

Left Lower Extremity: 4, Right Lower Extremity: 2/1





- Psychiatric Exam


Psychiatric exam: Depressed, Flat Affect





- Skin


Skin Exam: Dry, Intact, Normal Color, Warm





Assessment and Plan


(1) Fecal impaction


Status: Resolved   





(2) Diabetes mellitus


Status: Chronic   





(3) Malnutrition following gastrointestinal surgery


Assessment & Plan: 


refuses to eat; on NGT feedings; will discuss with family pt's code davy, pt 

refusing to have PEG put in place


Status: Chronic   





(4) Bacteremia


Assessment & Plan: 


4 more weeks of IV abx left


Status: Acute

## 2017-11-04 NOTE — PN
NEUROLOGY PROGRESS NOTE



DATE:



SUBJECTIVE:  The patient is lying on the bed, in no acute distress.



PHYSICAL EXAMINATION:

VITAL SIGNS:  Her blood pressure 102/68, heart rate is 119 per minute,

breathing at the rate of 18 per minute, and temperature 97.4 degree

Fahrenheit.

HEENT:  Head is normocephalic and atraumatic.

NECK:  Supple.  There are no carotid bruits.

LUNGS:  Clear.

CARDIOVASCULAR:  S1 and S2 audible.  No murmurs.

ABDOMEN:  Soft and nontender.  Bowel sounds present.

NEUROLOGIC:  Mental status:  The patient is awake and alert.  She follows

simple commands.  Cranial nerve examination:  Pupils are 3 mm, bilaterally

reactive to light.  Visual fields are full.  Extraocular movements are

intact.  There is mild decreased nasolabial fold on the right side.  There

is right-sided hemiplegia.  Plantars upgoing on the right side and

downgoing on the left side.



IMPRESSION:

1.  Status post new right-sided facial paresis.

2.  History of cerebrovascular accident with residual right-sided weakness.



RECOMMENDATIONS:

1.  The patient has not had her MRI of the brain as yet.

2.  Her right facial droop is almost resolved.

3.  The patient to be continued on aspirin as well as statin.

4.  The patient to have physical therapy.

5.  Please continue supportive care and other treatment.



Thank you for the opportunity to participate in the care of this patient.





__________________________________________

John Kinney MD





DD:  11/04/2017 11:20:33

DT:  11/04/2017 11:45:13

Job # [id number]

## 2017-11-04 NOTE — CP.PCM.PN
Subjective





- Date & Time of Evaluation


Date of Evaluation: 11/04/17


Time of Evaluation: 07:20





- Subjective


Subjective: 


General Surgery


Dr. Garcia





Pt S&E @bedside. NAEO. pt receiving tube feeds via NGT. per nursing notes, pt 

refused swallow eval. NGT was placed for enteral feeds. No N/V. (+)BM.





Objective





- Vital Signs/Intake and Output


Vital Signs (last 24 hours): 


 











Temp Pulse Resp BP Pulse Ox


 


 97.4 F L  119 H  20   102/68   99 


 


 11/04/17 07:00  11/04/17 07:05  11/04/17 07:00  11/04/17 09:00  11/04/17 07:00








Intake and Output: 


 











 11/04/17 11/04/17





 06:59 18:59


 


Intake Total 910 


 


Output Total 1001 


 


Balance -91 














- Medications


Medications: 


 Current Medications





Acetaminophen (Tylenol 325mg Tab)  325 mg PO Q4 PRN


   PRN Reason: Fever >100.4 F


Albuterol Sulfate (Albuterol 0.083% Inhal Sol (2.5 Mg/3 Ml) Ud)  2.5 mg IH RQ4 

[facility]


   Last Admin: 11/04/17 07:33 Dose:  2.5 mg


Aspirin (Ecotrin)  81 mg PO DAILY [facility]


   Last Admin: 11/04/17 09:00 Dose:  81 mg


Calcium/Vitamin D (Oyster Shell Calcium/Vitamin D 500 Mg-200 Iu)  1 tab PO TID 

[facility]


   Last Admin: 11/04/17 09:01 Dose:  1 tab


Dronabinol (Marinol)  2.5 mg PO BID [facility]


   Last Admin: 11/04/17 09:00 Dose:  2.5 mg


Enoxaparin Sodium (Lovenox)  50 mg SC Q12H [facility]


   Last Admin: 11/04/17 08:59 Dose:  50 mg


Furosemide (Lasix)  20 mg IVP DAILY [facility]


   Last Admin: 11/04/17 09:00 Dose:  20 mg


Hydromorphone HCl (Dilaudid)  0.5 mg IVP Q4H PRN


   PRN Reason: Pain, moderate (4-7)


   Last Admin: 11/04/17 03:42 Dose:  0.5 mg


Meropenem 500 mg/ Dextrose  100 mls @ 100 mls/hr IVPB Q8H [facility]


   Last Admin: 11/04/17 09:00 Dose:  100 mls/hr


Potassium Phosphate 8.25 mmole (/ Sodium Chloride)  1,002.75 mls @ 50 mls/hr IV 

.Q20H4M [facility]


   Last Admin: 11/03/17 22:00 Dose:  50 mls/hr


Insulin Human Regular (Novolin R)  0 unit SC ACHS [facility]


   PRN Reason: Protocol


   Last Admin: 11/04/17 08:27 Dose:  3 unit


Lactulose (Enulose)  20 gm PO Q6H [facility]


   Last Admin: 11/04/17 01:34 Dose:  20 gm


Metoclopramide HCl (Reglan)  5 mg IVP HS [facility]


   Last Admin: 11/03/17 21:42 Dose:  Not Given


Ondansetron HCl (Zofran Inj)  4 mg IVP Q4 PRN


   PRN Reason: Nausea/Vomiting


Pantoprazole Sodium (Protonix Inj)  40 mg IVP DAILY [facility]


   Last Admin: 11/04/17 09:00 Dose:  40 mg


Potassium Chloride (Potassium Chloride Oral Soln)  20 meq PO BID [facility]


   Last Admin: 11/04/17 08:59 Dose:  20 meq


Rosuvastatin Calcium (Crestor)  5 mg PO HS [facility]


   Last Admin: 11/03/17 22:01 Dose:  5 mg











- Labs


Labs: 


 





 11/03/17 13:53 





 11/04/17 06:36 





 











PT  26.3 SECONDS (9.7-12.2)  H  11/02/17  16:04    


 


INR  2.3   11/02/17  16:04    


 


APTT  50 SECONDS (21-34)  H  11/02/17  16:04    














- Constitutional


Appears: Non-toxic, No Acute Distress





- Head Exam


Head Exam: NORMAL INSPECTION





- Eye Exam


Eye Exam: Normal appearance





- ENT Exam


ENT Exam: Mucous Membranes Moist





- Respiratory Exam


Respiratory Exam: NORMAL BREATHING PATTERN.  absent: Accessory Muscle Use, 

Respiratory Distress





- Cardiovascular Exam


Cardiovascular Exam: absent: Bradycardia, Tachycardia





- GI/Abdominal Exam


GI & Abdominal Exam: Soft, Tenderness (TTP suprapubic).  absent: Distended, 

Guarding, Rebound


Additional comments: 





incision c/d/i





- Neurological Exam


Neurological Exam: Alert, Awake





- Psychiatric Exam


Psychiatric exam: Flat Affect, Normal Mood





- Skin


Skin Exam: Dry, Normal Color, Warm





Assessment and Plan





- Assessment and Plan (Free Text)


Assessment: 





75 y/o F w/ uterine infection, L adenexal mass POD#9 s/p KASSY BSO





- f/u GI recs regarding possible PEG


- cont NG tube feeds


- f/u AM labs


- Replete electrolytes PRN


- Encourage OOB to chair/IS use


- GI/DVT PPx





Pt discussed w/ Dr. Radha Sr DO PGY2

## 2017-11-04 NOTE — CP.PCM.PN
Subjective





- Date & Time of Evaluation


Date of Evaluation: 11/04/17


Time of Evaluation: 09:14





- Subjective


Subjective: 





F/u dysphagia


Covering DR Mar


NG tube in place.  Pt seen with RN present.


No RB, melena, fever, SZ, hematuria, hemoptysis





Objective





- Vital Signs/Intake and Output


Vital Signs (last 24 hours): 


 











Temp Pulse Resp BP Pulse Ox


 


 97.4 F L  119 H  20   102/68   99 


 


 11/04/17 07:00  11/04/17 07:05  11/04/17 07:00  11/04/17 09:00  11/04/17 07:00








Intake and Output: 


 











 11/04/17 11/04/17





 06:59 18:59


 


Intake Total 910 


 


Output Total 1001 


 


Balance -91 














- Medications


Medications: 


 Current Medications





Acetaminophen (Tylenol 325mg Tab)  325 mg PO Q4 PRN


   PRN Reason: Fever >100.4 F


Albuterol Sulfate (Albuterol 0.083% Inhal Sol (2.5 Mg/3 Ml) Ud)  2.5 mg IH RQ4 

[facility]


   Last Admin: 11/04/17 07:33 Dose:  2.5 mg


Aspirin (Ecotrin)  81 mg PO DAILY [facility]


   Last Admin: 11/04/17 09:00 Dose:  81 mg


Calcium/Vitamin D (Oyster Shell Calcium/Vitamin D 500 Mg-200 Iu)  1 tab PO TID 

[facility]


   Last Admin: 11/04/17 09:01 Dose:  1 tab


Dronabinol (Marinol)  2.5 mg PO BID [facility]


   Last Admin: 11/04/17 09:00 Dose:  2.5 mg


Enoxaparin Sodium (Lovenox)  50 mg SC Q12H [facility]


   Last Admin: 11/04/17 08:59 Dose:  50 mg


Furosemide (Lasix)  20 mg IVP DAILY [facility]


   Last Admin: 11/04/17 09:00 Dose:  20 mg


Hydromorphone HCl (Dilaudid)  0.5 mg IVP Q4H PRN


   PRN Reason: Pain, moderate (4-7)


   Last Admin: 11/04/17 03:42 Dose:  0.5 mg


Meropenem 500 mg/ Dextrose  100 mls @ 100 mls/hr IVPB Q8H [facility]


   Last Admin: 11/04/17 09:00 Dose:  100 mls/hr


Potassium Phosphate 8.25 mmole (/ Sodium Chloride)  1,002.75 mls @ 50 mls/hr IV 

.Q20H4M [facility]


   Last Admin: 11/03/17 22:00 Dose:  50 mls/hr


Insulin Human Regular (Novolin R)  0 unit SC ACHS [facility]


   PRN Reason: Protocol


   Last Admin: 11/04/17 08:27 Dose:  3 unit


Lactulose (Enulose)  20 gm PO Q6H [facility]


   Last Admin: 11/04/17 01:34 Dose:  20 gm


Metoclopramide HCl (Reglan)  5 mg IVP [facility]


   Last Admin: 11/03/17 21:42 Dose:  Not Given


Ondansetron HCl (Zofran Inj)  4 mg IVP Q4 PRN


   PRN Reason: Nausea/Vomiting


Pantoprazole Sodium (Protonix Inj)  40 mg IVP DAILY [facility]


   Last Admin: 11/04/17 09:00 Dose:  40 mg


Potassium Chloride (Potassium Chloride Oral Soln)  20 meq PO BID [facility]


   Last Admin: 11/04/17 08:59 Dose:  20 meq


Rosuvastatin Calcium (Crestor)  5 mg PO [facility]


   Last Admin: 11/03/17 22:01 Dose:  5 mg











- Labs


Labs: 


 





 11/03/17 13:53 





 11/04/17 06:36 





 











PT  26.3 SECONDS (9.7-12.2)  H  11/02/17  16:04    


 


INR  2.3   11/02/17  16:04    


 


APTT  50 SECONDS (21-34)  H  11/02/17  16:04    














- Constitutional


Appears: Non-toxic





- Neck Exam


Neck Exam: absent: Tenderness





- Respiratory Exam


Respiratory Exam: Clear to Ausculation Bilateral





- Cardiovascular Exam


Cardiovascular Exam: RRR





- GI/Abdominal Exam


GI & Abdominal Exam: Soft, Tenderness, Normal Bowel Sounds


Additional comments: 





sl tender around staples





- Neurological Exam


Neurological Exam: Alert, Awake.  absent: Oriented x3





Assessment and Plan


(1) Hyponatremia


Status: Acute   





(2) Troponin level elevated


Status: Acute   





(3) Elevated brain natriuretic peptide (BNP) level


Status: Acute   





(4) Bandemia


Status: Acute   





(5) Fecal impaction


Status: Resolved   





(6) Urinary tract infection


Status: Acute   





(7) Diabetes mellitus


Status: Chronic   





(8) Dysphagia as late effect of cerebrovascular accident (CVA)


Assessment & Plan: 


NG tube in place.  COnsider PEG if not able to eat and medical condition 

allows.  Consider re-attempt at dysphagia eval.


Status: Chronic   





(9) Anemia


Status: Acute

## 2017-11-05 LAB
ALBUMIN/GLOB SERPL: 0.9 {RATIO} (ref 1–2.1)
ALP SERPL-CCNC: 57 U/L (ref 38–126)
ALT SERPL-CCNC: 43 U/L (ref 9–52)
AST SERPL-CCNC: 39 U/L (ref 14–36)
BASOPHILS # BLD AUTO: 0 K/UL (ref 0–0.2)
BASOPHILS NFR BLD: 0.3 % (ref 0–2)
BILIRUB SERPL-MCNC: 0.7 MG/DL (ref 0.2–1.3)
BUN SERPL-MCNC: 11 MG/DL (ref 7–17)
CALCIUM SERPL-MCNC: 7.4 MG/DL (ref 8.6–10.4)
CHLORIDE SERPL-SCNC: 101 MMOL/L (ref 98–107)
CO2 SERPL-SCNC: 24 MMOL/L (ref 22–30)
EOSINOPHIL # BLD AUTO: 0.1 K/UL (ref 0–0.7)
EOSINOPHIL NFR BLD: 1.2 % (ref 0–4)
ERYTHROCYTE [DISTWIDTH] IN BLOOD BY AUTOMATED COUNT: 14 % (ref 11.5–14.5)
GLOBULIN SER-MCNC: 2.8 GM/DL (ref 2.2–3.9)
GLUCOSE SERPL-MCNC: 140 MG/DL (ref 65–105)
HCT VFR BLD CALC: 28.4 % (ref 34–47)
LYMPHOCYTES # BLD AUTO: 0.6 K/UL (ref 1–4.3)
LYMPHOCYTES NFR BLD AUTO: 10.5 % (ref 20–40)
MCH RBC QN AUTO: 29.3 PG (ref 27–31)
MCHC RBC AUTO-ENTMCNC: 32.8 G/DL (ref 33–37)
MCV RBC AUTO: 89.4 FL (ref 81–99)
MONOCYTES # BLD: 0.1 K/UL (ref 0–0.8)
MONOCYTES NFR BLD: 2.3 % (ref 0–10)
NRBC BLD AUTO-RTO: 0 % (ref 0–2)
PHOSPHATE SERPL-MCNC: 2 MG/DL (ref 2.5–4.5)
PLATELET # BLD: 160 K/UL (ref 130–400)
PMV BLD AUTO: 9.5 FL (ref 7.2–11.7)
POTASSIUM SERPL-SCNC: 4.7 MMOL/L (ref 3.6–5.2)
PROT SERPL-MCNC: 5.2 G/DL (ref 6.3–8.3)
SODIUM SERPL-SCNC: 129 MMOL/L (ref 132–148)
WBC # BLD AUTO: 5.6 K/UL (ref 4.8–10.8)

## 2017-11-05 RX ADMIN — SODIUM CHLORIDE SCH MLS/HR: 0.9 INJECTION, SOLUTION INTRAVENOUS at 15:33

## 2017-11-05 RX ADMIN — POTASSIUM CHLORIDE SCH MEQ: 1.5 SOLUTION ORAL at 10:00

## 2017-11-05 RX ADMIN — ALBUTEROL SULFATE SCH: 2.5 SOLUTION RESPIRATORY (INHALATION) at 01:01

## 2017-11-05 RX ADMIN — POTASSIUM CHLORIDE SCH MEQ: 1.5 SOLUTION ORAL at 21:44

## 2017-11-05 RX ADMIN — SODIUM CHLORIDE SCH MLS/HR: 0.9 INJECTION, SOLUTION INTRAVENOUS at 00:17

## 2017-11-05 RX ADMIN — CALCIUM CARBONATE-VITAMIN D TAB 500 MG-200 UNIT SCH TAB: 500-200 TAB at 17:27

## 2017-11-05 RX ADMIN — CALCIUM CARBONATE-VITAMIN D TAB 500 MG-200 UNIT SCH TAB: 500-200 TAB at 09:00

## 2017-11-05 RX ADMIN — SODIUM CHLORIDE SCH MLS/HR: 0.9 INJECTION, SOLUTION INTRAVENOUS at 08:16

## 2017-11-05 RX ADMIN — HUMAN INSULIN SCH UNIT: 100 INJECTION, SOLUTION SUBCUTANEOUS at 17:27

## 2017-11-05 RX ADMIN — HUMAN INSULIN SCH UNIT: 100 INJECTION, SOLUTION SUBCUTANEOUS at 08:12

## 2017-11-05 RX ADMIN — ENOXAPARIN SODIUM SCH MG: 60 INJECTION SUBCUTANEOUS at 21:38

## 2017-11-05 RX ADMIN — SODIUM CHLORIDE SCH: 0.9 INJECTION, SOLUTION INTRAVENOUS at 16:45

## 2017-11-05 RX ADMIN — ALBUTEROL SULFATE SCH MG: 2.5 SOLUTION RESPIRATORY (INHALATION) at 20:25

## 2017-11-05 RX ADMIN — ALBUTEROL SULFATE SCH MG: 2.5 SOLUTION RESPIRATORY (INHALATION) at 07:30

## 2017-11-05 RX ADMIN — ENOXAPARIN SODIUM SCH MG: 60 INJECTION SUBCUTANEOUS at 09:00

## 2017-11-05 RX ADMIN — CALCIUM CARBONATE-VITAMIN D TAB 500 MG-200 UNIT SCH TAB: 500-200 TAB at 13:00

## 2017-11-05 RX ADMIN — HUMAN INSULIN SCH UNIT: 100 INJECTION, SOLUTION SUBCUTANEOUS at 12:00

## 2017-11-05 RX ADMIN — HYDROMORPHONE HYDROCHLORIDE PRN MG: 1 INJECTION, SOLUTION INTRAMUSCULAR; INTRAVENOUS; SUBCUTANEOUS at 06:22

## 2017-11-05 RX ADMIN — HUMAN INSULIN SCH: 100 INJECTION, SOLUTION SUBCUTANEOUS at 22:27

## 2017-11-05 RX ADMIN — ALBUTEROL SULFATE SCH MG: 2.5 SOLUTION RESPIRATORY (INHALATION) at 13:52

## 2017-11-05 NOTE — CP.PCM.PN
Subjective





- Date & Time of Evaluation


Date of Evaluation: 11/05/17


Time of Evaluation: 13:00





- Subjective


Subjective: 





Surgery: Dr. Garcia





Pt seen and examined. Resting comfortably in bed. Per nursing no acute events 

overnight. Pt is receiving tube feeds via NGT.





Objective





- Vital Signs/Intake and Output


Vital Signs (last 24 hours): 


 











Temp Pulse Resp BP Pulse Ox


 


 98.3 F   116 H  20   116/65   100 


 


 11/04/17 23:20  11/04/17 23:20  11/04/17 23:20  11/05/17 08:59  11/04/17 23:20








Intake and Output: 


 











 11/05/17 11/05/17





 06:59 18:59


 


Intake Total  


 


Output Total  


 


Balance  














- Medications


Medications: 


 Current Medications





Acetaminophen (Tylenol 325mg Tab)  325 mg PO Q4 PRN


   PRN Reason: Fever >100.4 F


Albuterol Sulfate (Albuterol 0.083% Inhal Sol (2.5 Mg/3 Ml) Ud)  2.5 mg IH RQ6 

[facility]


   Last Admin: 11/05/17 07:30 Dose:  2.5 mg


Aspirin (Ecotrin)  81 mg PO DAILY [facility]


   Last Admin: 11/05/17 09:00 Dose:  81 mg


Calcium/Vitamin D (Oyster Shell Calcium/Vitamin D 500 Mg-200 Iu)  1 tab PO TID 

[facility]


   Last Admin: 11/05/17 09:00 Dose:  1 tab


Dronabinol (Marinol)  2.5 mg PO BID [facility]


   Last Admin: 11/05/17 09:03 Dose:  2.5 mg


Enoxaparin Sodium (Lovenox)  50 mg SC Q12H [facility]


   Last Admin: 11/05/17 09:00 Dose:  50 mg


Furosemide (Lasix)  20 mg IVP DAILY [facility]


   Last Admin: 11/05/17 08:59 Dose:  20 mg


Hydromorphone HCl (Dilaudid)  0.5 mg IVP Q4H PRN


   PRN Reason: Pain, moderate (4-7)


   Last Admin: 11/05/17 06:22 Dose:  0.5 mg


Meropenem 500 mg/ Dextrose  100 mls @ 100 mls/hr IVPB Q8H [facility]


   Last Admin: 11/05/17 08:16 Dose:  100 mls/hr


Potassium Phosphate 8.25 mmole (/ Sodium Chloride)  1,002.75 mls @ 30 mls/hr IV 

.Q24H [facility]


   Last Admin: 11/04/17 17:00 Dose:  Not Given


Insulin Human Regular (Novolin R)  0 unit SC ACHS [facility]


   PRN Reason: Protocol


   Last Admin: 11/05/17 12:00 Dose:  3 unit


Lactulose (Enulose)  20 gm PO Q12H [facility]


   Last Admin: 11/05/17 04:32 Dose:  Not Given


Metoclopramide HCl (Reglan)  5 mg IVP [facility]


   Last Admin: 11/04/17 21:48 Dose:  5 mg


Ondansetron HCl (Zofran Inj)  4 mg IVP Q4 PRN


   PRN Reason: Nausea/Vomiting


Pantoprazole Sodium (Protonix Inj)  40 mg IVP DAILY [facility]


   Last Admin: 11/05/17 08:59 Dose:  40 mg


Potassium Chloride (Potassium Chloride Oral Soln)  40 meq PO Q12 [facility]


   Last Admin: 11/05/17 10:00 Dose:  40 meq


Rosuvastatin Calcium (Crestor)  5 mg PO [facility]


   Last Admin: 11/04/17 21:47 Dose:  5 mg











- Labs


Labs: 


 





 11/05/17 07:50 





 11/05/17 07:50 





 











PT  26.3 SECONDS (9.7-12.2)  H  11/02/17  16:04    


 


INR  2.3   11/02/17  16:04    


 


APTT  50 SECONDS (21-34)  H  11/02/17  16:04    














- Constitutional


Appears: Non-toxic, No Acute Distress





- Head Exam


Head Exam: ATRAUMATIC, NORMOCEPHALIC





- Eye Exam


Eye Exam: EOMI





- ENT Exam


ENT Exam: Mucous Membranes Moist





- Respiratory Exam


Respiratory Exam: NORMAL BREATHING PATTERN.  absent: Accessory Muscle Use, 

Respiratory Distress





- GI/Abdominal Exam


GI & Abdominal Exam: Soft.  absent: Distended, Firm, Guarding, Rigid, Tenderness

, Rebound


Additional comments: 





midline incision C/D/I w. staples





- Extremities Exam


Extremities Exam: absent: Calf Tenderness, Pedal Edema





- Neurological Exam


Neurological Exam: Alert, Awake





- Skin


Skin Exam: Dry, Warm





Assessment and Plan





- Assessment and Plan (Free Text)


Assessment: 





76F w. uterine infection and L adenexal mass, s/p KASSY BSO, POD#10, w. recent CVA


-No PO intake, on tube feeds, will f/u possibility of PEG vs open G-tube


-will continue to follow


-d/w attending





Cassandra PGY3

## 2017-11-05 NOTE — CARD
--------------- APPROVED REPORT --------------





EXAM: Two-dimensional and M-mode echocardiogram with Doppler and 

color Doppler.



Other Information 

Quality : GoodRhythm : NSR



INDICATION

CVA/TIA STROKE, COLITIS, TOXIC, MEGACOLON, ELEVATED TROPONIN



RISK FACTORS

Diabetes



M-Mode DIMENSIONS 

RVDd0.78   (2.1-3.2cm)Left Atrium (MM)2.71   (2.5-4.0cm)

IVSd0.78   (0.7-1.1cm)Aortic Root2.99   (2.2-3.7cm)

LVDd4.76   (4.0-5.6cm)Aortic Cusp Exc.1.72   (1.5-2.0cm)

PWd0.96   (0.7-1.1cm)



Aortic Valve

AoV Peak Dzenypqk440.1cm/Rosamaria Peak GR.6mmHg



Mitral Valve

MV E Upnnvzub694.9cm/sMV A Gmllyogz25.1cm/sE/A ratio4.2



TDI

E/Lateral E'0.0E/Medial E'0.0



Tricuspid Valve

TR Peak Hdlxrnox348ck/sTR Peak Gr.[id number]



 LEFT VENTRICLE 

The left ventricle is normal size.

There is normal left ventricular wall thickness.

Left ventricle systolic function is mildly impaired.

The Ejection Fraction is 40-45%.

There is mild global hypokinesis of the left ventricle.

The left ventricular diastolic function is normal.



 RIGHT VENTRICLE 

The right ventricle is normal size.

There is normal right ventricular wall thickness.

Systolic function is mildly reduced.



 AORTIC VALVE 

The aortic valve is normal in structure.

No aortic regurgitation is present.

There is no aortic valvular stenosis. 

There is no aortic valvular vegetation.



 MITRAL VALVE 

The mitral valve is normal in structure.

There is no evidence of mitral valve prolapse.

There is no mitral valve stenosis.

Mitral regurgitation is trace.



 TRICUSPID VALVE 

The tricuspid valve is normal in structure.

There is mild tricuspid regurgitation.

Right ventricular systolic pressure is estimated at 30-40 mmHg. 

There is mild pulmonary hypertension.



 PULMONIC VALVE 

The pulmonic valve is not well visualized.

There is no pulmonic valvular regurgitation. 



 GREAT VESSELS 

The aortic root is normal in size.



 PERICARDIAL EFFUSION 

There is no significant  pericardial effusion.



<Conclusion>

Left ventricle systolic function is mildly impaired.

The Ejection Fraction is 40-45%.

No aortic regurgitation is present.

Mitral regurgitation is trace.

There is mild tricuspid regurgitation.

There is mild pulmonary hypertension.

There is no pulmonic valvular regurgitation.

## 2017-11-05 NOTE — CARD
--------------- APPROVED REPORT --------------





EKG Measurement

Heart [id number]

MO [id number]

[id number]

[id number]

[id number]



<Conclusion>

Sinus tachycardia with short MO

ST & T wave abnormality, consider inferior ischemia

ST & T wave abnormality, consider anterolateral ischemia

Prolonged QT

Abnormal ECG

## 2017-11-05 NOTE — CARD
--------------- APPROVED REPORT --------------





EKG Measurement

Heart [id number]

PA 98P2

[id number]

[id number]

[id number]



<Conclusion>

Sinus tachycardia with short PA with premature supraventricular 

complexes

T wave abnormality, consider inferior ischemia

T wave abnormality, consider anterolateral ischemia

Prolonged QT

Abnormal ECG

## 2017-11-05 NOTE — CP.PCM.PN
Subjective





- Date & Time of Evaluation


Date of Evaluation: 11/05/17


Time of Evaluation: 09:43





- Subjective


Subjective: 





Covering DR Mar


F/U dysphagia, fecal impaction.


No report of bleeding, tremor, SZ, cough, RB, melena, hematuria, hemoptysis





Objective





- Vital Signs/Intake and Output


Vital Signs (last 24 hours): 


 











Temp Pulse Resp BP Pulse Ox


 


 98.3 F   116 H  20   116/65   100 


 


 11/04/17 23:20  11/04/17 23:20  11/04/17 23:20  11/05/17 08:59  11/04/17 23:20








Intake and Output: 


 











 11/05/17 11/05/17





 06:59 18:59


 


Intake Total  


 


Output Total  


 


Balance  














- Medications


Medications: 


 Current Medications





Acetaminophen (Tylenol 325mg Tab)  325 mg PO Q4 PRN


   PRN Reason: Fever >100.4 F


Albuterol Sulfate (Albuterol 0.083% Inhal Sol (2.5 Mg/3 Ml) Ud)  2.5 mg IH RQ6 

[facility]


   Last Admin: 11/05/17 07:30 Dose:  2.5 mg


Aspirin (Ecotrin)  81 mg PO DAILY [facility]


   Last Admin: 11/05/17 09:00 Dose:  81 mg


Calcium/Vitamin D (Oyster Shell Calcium/Vitamin D 500 Mg-200 Iu)  1 tab PO TID 

[facility]


   Last Admin: 11/05/17 09:00 Dose:  1 tab


Dronabinol (Marinol)  2.5 mg PO BID [facility]


   Last Admin: 11/05/17 09:03 Dose:  2.5 mg


Enoxaparin Sodium (Lovenox)  50 mg SC Q12H [facility]


   Last Admin: 11/05/17 09:00 Dose:  50 mg


Furosemide (Lasix)  20 mg IVP DAILY [facility]


   Last Admin: 11/05/17 08:59 Dose:  20 mg


Hydromorphone HCl (Dilaudid)  0.5 mg IVP Q4H PRN


   PRN Reason: Pain, moderate (4-7)


   Last Admin: 11/05/17 06:22 Dose:  0.5 mg


Meropenem 500 mg/ Dextrose  100 mls @ 100 mls/hr IVPB Q8H [facility]


   Last Admin: 11/05/17 08:16 Dose:  100 mls/hr


Potassium Phosphate 8.25 mmole (/ Sodium Chloride)  1,002.75 mls @ 30 mls/hr IV 

.Q24H [facility]


   Last Admin: 11/04/17 17:00 Dose:  Not Given


Insulin Human Regular (Novolin R)  0 unit SC ACHS [facility]


   PRN Reason: Protocol


   Last Admin: 11/05/17 08:12 Dose:  2 unit


Lactulose (Enulose)  20 gm PO Q12H [facility]


   Last Admin: 11/05/17 04:32 Dose:  Not Given


Metoclopramide HCl (Reglan)  5 mg IVP [facility]


   Last Admin: 11/04/17 21:48 Dose:  5 mg


Ondansetron HCl (Zofran Inj)  4 mg IVP Q4 PRN


   PRN Reason: Nausea/Vomiting


Pantoprazole Sodium (Protonix Inj)  40 mg IVP DAILY [facility]


   Last Admin: 11/05/17 08:59 Dose:  40 mg


Potassium Chloride (Potassium Chloride Oral Soln)  40 meq PO Q12 [facility]


   Last Admin: 11/04/17 22:31 Dose:  40 meq


Rosuvastatin Calcium (Crestor)  5 mg PO [facility]


   Last Admin: 11/04/17 21:47 Dose:  5 mg











- Labs


Labs: 


 





 11/05/17 07:50 





 11/05/17 07:50 





 











PT  26.3 SECONDS (9.7-12.2)  H  11/02/17  16:04    


 


INR  2.3   11/02/17  16:04    


 


APTT  50 SECONDS (21-34)  H  11/02/17  16:04    














- Constitutional


Appears: Non-toxic





- ENT Exam


Additional comments: 





NG tube in





- Respiratory Exam


Respiratory Exam: Clear to Ausculation Bilateral





- Cardiovascular Exam


Cardiovascular Exam: RRR





- GI/Abdominal Exam


GI & Abdominal Exam: Soft, Tenderness, Normal Bowel Sounds.  absent: Guarding


Additional comments: 





Sl tender aaround incision/staples midline





- Extremities Exam


Extremities Exam: absent: Calf Tenderness





- Neurological Exam


Neurological Exam: Awake.  absent: Oriented x3





Assessment and Plan


(1) Hyponatremia


Assessment & Plan: 





Status: Acute   





(2) Troponin level elevated


Status: Acute   





(3) Elevated brain natriuretic peptide (BNP) level


Status: Acute   





(4) Bandemia


Assessment & Plan: 


less


Status: Acute   





(5) Fecal impaction


Assessment & Plan: 


treating


Status: Resolved   





(6) Urinary tract infection


Status: Acute   





(7) Diabetes mellitus


Status: Chronic   





(8) Dysphagia as late effect of cerebrovascular accident (CVA)


Assessment & Plan: 


On NG tube feedings.  Asking for PEG.  Pt had recent abdom surgery with scar. 


Status: Chronic   





(9) Anemia


Status: Acute

## 2017-11-05 NOTE — CP.PCM.PN
Subjective





- Date & Time of Evaluation


Date of Evaluation: 11/05/17


Time of Evaluation: 08:00





- Subjective


Subjective: 





awake NAD


afebrile





Objective





- Vital Signs/Intake and Output


Vital Signs (last 24 hours): 


 











Temp Pulse Resp BP Pulse Ox


 


 98.3 F   116 H  20   116/65   100 


 


 11/04/17 23:20  11/04/17 23:20  11/04/17 23:20  11/05/17 08:59  11/04/17 23:20








Intake and Output: 


 











 11/05/17 11/05/17





 06:59 18:59


 


Intake Total  


 


Output Total  


 


Balance  














- Medications


Medications: 


 Current Medications





Acetaminophen (Tylenol 325mg Tab)  325 mg PO Q4 PRN


   PRN Reason: Fever >100.4 F


Albuterol Sulfate (Albuterol 0.083% Inhal Sol (2.5 Mg/3 Ml) Ud)  2.5 mg IH RQ6 

[facility]


   Last Admin: 11/05/17 07:30 Dose:  2.5 mg


Aspirin (Ecotrin)  81 mg PO DAILY [facility]


   Last Admin: 11/05/17 09:00 Dose:  81 mg


Calcium/Vitamin D (Oyster Shell Calcium/Vitamin D 500 Mg-200 Iu)  1 tab PO TID 

[facility]


   Last Admin: 11/05/17 09:00 Dose:  1 tab


Dronabinol (Marinol)  2.5 mg PO BID [facility]


   Last Admin: 11/05/17 09:03 Dose:  2.5 mg


Enoxaparin Sodium (Lovenox)  50 mg SC Q12H [facility]


   Last Admin: 11/05/17 09:00 Dose:  50 mg


Furosemide (Lasix)  20 mg IVP DAILY [facility]


   Last Admin: 11/05/17 08:59 Dose:  20 mg


Hydromorphone HCl (Dilaudid)  0.5 mg IVP Q4H PRN


   PRN Reason: Pain, moderate (4-7)


   Last Admin: 11/05/17 06:22 Dose:  0.5 mg


Meropenem 500 mg/ Dextrose  100 mls @ 100 mls/hr IVPB Q8H [facility]


   Last Admin: 11/05/17 08:16 Dose:  100 mls/hr


Potassium Phosphate 8.25 mmole (/ Sodium Chloride)  1,002.75 mls @ 30 mls/hr IV 

.Q24H [facility]


   Last Admin: 11/04/17 17:00 Dose:  Not Given


Insulin Human Regular (Novolin R)  0 unit SC ACHS [facility]


   PRN Reason: Protocol


   Last Admin: 11/05/17 08:12 Dose:  2 unit


Lactulose (Enulose)  20 gm PO Q12H [facility]


   Last Admin: 11/05/17 04:32 Dose:  Not Given


Metoclopramide HCl (Reglan)  5 mg IVP HS [facility]


   Last Admin: 11/04/17 21:48 Dose:  5 mg


Ondansetron HCl (Zofran Inj)  4 mg IVP Q4 PRN


   PRN Reason: Nausea/Vomiting


Pantoprazole Sodium (Protonix Inj)  40 mg IVP DAILY [facility]


   Last Admin: 11/05/17 08:59 Dose:  40 mg


Potassium Chloride (Potassium Chloride Oral Soln)  40 meq PO Q12 [facility]


   Last Admin: 11/04/17 22:31 Dose:  40 meq


Rosuvastatin Calcium (Crestor)  5 mg PO HS [facility]


   Last Admin: 11/04/17 21:47 Dose:  5 mg











- Labs


Labs: 


 





 11/05/17 07:50 





 11/05/17 07:50 





 











PT  26.3 SECONDS (9.7-12.2)  H  11/02/17  16:04    


 


INR  2.3   11/02/17  16:04    


 


APTT  50 SECONDS (21-34)  H  11/02/17  16:04    














- Constitutional


Appears: Non-toxic, Cachectic





- Head Exam


Head Exam: NORMOCEPHALIC





- Eye Exam


Eye Exam: PERRL





- ENT Exam


ENT Exam: Mucous Membranes Dry





- Neck Exam


Neck Exam: absent: Lymphadenopathy





- Respiratory Exam


Respiratory Exam: Decreased Breath Sounds





- Cardiovascular Exam


Cardiovascular Exam: REGULAR RHYTHM





- GI/Abdominal Exam


GI & Abdominal Exam: Distended





-  Exam


 Exam: NORMAL INSPECTION





- Back Exam


Back Exam: absent: CVA tenderness (R), NORMAL INSPECTION





- Neurological Exam


Neuro motor strength exam: Left Upper Extremity: 3, Right Upper Extremity: 0, 

Left Lower Extremity: 3, Right Lower Extremity: 0





- Psychiatric Exam


Psychiatric exam: Depressed





- Skin


Skin Exam: Dry





Assessment and Plan


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


Status: Chronic   





(2) Diabetes mellitus


Status: Chronic   





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


Status: Deleted

## 2017-11-05 NOTE — PN
DATE:



NEUROLOGY PROGRESS NOTE



SUBJECTIVE:  The patient is lying on the bed, in no acute distress.



PHYSICAL EXAMINATION

VITAL SIGNS:  Her blood pressure is 106/67, heart rate is 116 per minute,

breathing at the rate of 16 per minute, and temperature is 98.3 degree

Fahrenheit.

HEENT:  Head is normocephalic and atraumatic.

NECK:  Supple.  There are no carotid bruits.

LUNGS:  Clear.

CARDIOVASCULAR:  S1 and S2 audible.  No murmurs.

ABDOMEN:  Soft, nontender.  Bowel sounds present.

NEUROLOGIC:  Mental status:  The patient is awake and alert.  She looks at

the examiner, follows some simple commands.  Cranial nerve examination: 

Pupils are 3 mm, bilaterally reactive to light.  Visual fields are full. 

Extraocular movements are intact.  There is decreased nasolabial fold on

the right side.  There is right-sided hemiplegia.  Plantars upgoing on the

right side and downgoing on the left side.  Finger-to-nose no gross

dysmetria seen.



IMPRESSION:

1.  Status post new right-sided facial paresis, which is better now.  This

is likely secondary to transient ischemic attack versus new stroke.

2.  History of cerebrovascular accident with right-sided hemiplegia.



RECOMMENDATIONS:

1.  The patient's right-sided facial droop is back to baseline.

2.  She had carotid Doppler study done, which shows mild disease.  No

significant stenosis.

3.  The patient to be continued on aspirin.

4.  The patient also to be continued on statin.

5.  The patient to have physical therapy and occupational therapy.

6.  Please continue supportive care and other treatment.



Thank you for the opportunity to participate in the care of this patient.







__________________________________________

John Kinney MD





DD:  11/05/2017 8:34:23

DT:  11/05/2017 9:13:16

Job # [id number]

## 2017-11-06 LAB
APTT BLD: 34 SECONDS (ref 21–34)
BASOPHILS # BLD AUTO: 0 K/UL (ref 0–0.2)
BASOPHILS NFR BLD: 0.8 % (ref 0–2)
EOSINOPHIL # BLD AUTO: 0.1 K/UL (ref 0–0.7)
EOSINOPHIL NFR BLD: 2.3 % (ref 0–4)
ERYTHROCYTE [DISTWIDTH] IN BLOOD BY AUTOMATED COUNT: 14 % (ref 11.5–14.5)
HCT VFR BLD CALC: 26 % (ref 34–47)
INR PPP: 1.1
LYMPHOCYTES # BLD AUTO: 0.6 K/UL (ref 1–4.3)
LYMPHOCYTES NFR BLD AUTO: 10.8 % (ref 20–40)
MCH RBC QN AUTO: 30.5 PG (ref 27–31)
MCHC RBC AUTO-ENTMCNC: 34.1 G/DL (ref 33–37)
MCV RBC AUTO: 89.4 FL (ref 81–99)
MONOCYTES # BLD: 0.2 K/UL (ref 0–0.8)
MONOCYTES NFR BLD: 3.2 % (ref 0–10)
NRBC BLD AUTO-RTO: 0.1 % (ref 0–2)
PLATELET # BLD: 144 K/UL (ref 130–400)
PMV BLD AUTO: 9.7 FL (ref 7.2–11.7)
WBC # BLD AUTO: 5.3 K/UL (ref 4.8–10.8)

## 2017-11-06 RX ADMIN — ENOXAPARIN SODIUM SCH MG: 60 INJECTION SUBCUTANEOUS at 21:09

## 2017-11-06 RX ADMIN — ALBUTEROL SULFATE SCH: 2.5 SOLUTION RESPIRATORY (INHALATION) at 19:56

## 2017-11-06 RX ADMIN — HYDROMORPHONE HYDROCHLORIDE PRN MG: 1 INJECTION, SOLUTION INTRAMUSCULAR; INTRAVENOUS; SUBCUTANEOUS at 08:15

## 2017-11-06 RX ADMIN — POTASSIUM CHLORIDE SCH MEQ: 1.5 SOLUTION ORAL at 21:08

## 2017-11-06 RX ADMIN — ALBUTEROL SULFATE SCH: 2.5 SOLUTION RESPIRATORY (INHALATION) at 01:22

## 2017-11-06 RX ADMIN — HUMAN INSULIN SCH UNIT: 100 INJECTION, SOLUTION SUBCUTANEOUS at 08:12

## 2017-11-06 RX ADMIN — ALBUTEROL SULFATE SCH MG: 2.5 SOLUTION RESPIRATORY (INHALATION) at 13:22

## 2017-11-06 RX ADMIN — SODIUM CHLORIDE SCH MLS/HR: 0.9 INJECTION, SOLUTION INTRAVENOUS at 16:20

## 2017-11-06 RX ADMIN — SODIUM CHLORIDE SCH MLS/HR: 0.9 INJECTION, SOLUTION INTRAVENOUS at 09:12

## 2017-11-06 RX ADMIN — SODIUM CHLORIDE SCH MLS/HR: 0.9 INJECTION, SOLUTION INTRAVENOUS at 17:46

## 2017-11-06 RX ADMIN — CALCIUM CARBONATE-VITAMIN D TAB 500 MG-200 UNIT SCH TAB: 500-200 TAB at 17:46

## 2017-11-06 RX ADMIN — HUMAN INSULIN SCH UNIT: 100 INJECTION, SOLUTION SUBCUTANEOUS at 17:47

## 2017-11-06 RX ADMIN — HUMAN INSULIN SCH UNIT: 100 INJECTION, SOLUTION SUBCUTANEOUS at 13:37

## 2017-11-06 RX ADMIN — ENOXAPARIN SODIUM SCH MG: 60 INJECTION SUBCUTANEOUS at 10:35

## 2017-11-06 RX ADMIN — HUMAN INSULIN SCH: 100 INJECTION, SOLUTION SUBCUTANEOUS at 21:33

## 2017-11-06 RX ADMIN — POTASSIUM CHLORIDE SCH MEQ: 1.5 SOLUTION ORAL at 10:35

## 2017-11-06 RX ADMIN — SODIUM CHLORIDE SCH MLS/HR: 0.9 INJECTION, SOLUTION INTRAVENOUS at 00:34

## 2017-11-06 RX ADMIN — CALCIUM CARBONATE-VITAMIN D TAB 500 MG-200 UNIT SCH TAB: 500-200 TAB at 10:36

## 2017-11-06 RX ADMIN — CALCIUM CARBONATE-VITAMIN D TAB 500 MG-200 UNIT SCH TAB: 500-200 TAB at 13:40

## 2017-11-06 RX ADMIN — ALBUTEROL SULFATE SCH MG: 2.5 SOLUTION RESPIRATORY (INHALATION) at 06:50

## 2017-11-06 NOTE — PN
NEUROLOGY PROGRESS NOTE



SUBJECTIVE:  The patient is lying in the bed, in no acute distress,

complains of some abdominal discomfort.



PHYSICAL EXAMINATION:

VITAL SIGNS:  Her blood pressure is 118/77, heart rate is 116 per minute,

breathing at a rate of 16 per minute, temperature is 98.1 degree

Fahrenheit.

HEENT:  Head is normocephalic, atraumatic.

NECK:  Supple.  There are no carotid bruits.

LUNGS:  Clear.

CARDIOVASCULAR:  S1, S2 audible.  No murmurs.

ABDOMEN:  Soft and nontender.  Bowel sounds present.

NEUROLOGY:  Mental status:  The patient is awake and alert.  She follows

simple commands.  She appears to have expressive aphasia.  Cranial nerve

examination:  Pupils are 4 mm bilaterally reactive to light.  Visual fields

are full.  Extraocular movements are intact.  There is decreased nasolabial

fold on the right side.  There is right-sided hemiplegia.  Plantars upgoing

on the right side and downgoing on the left side.



IMPRESSION:

1.  Status post new right-sided facial paresis, which is better now.  This

may have been secondary to a transient ischemic attack versus new stroke.

2.  History of cerebrovascular accident with right-sided hemiplegia.



RECOMMENDATIONS:

1.  The patient is back to her baseline neurologic status.

2.  The patient to have physical therapy.

3.  The patient to be continued on aspirin and statin.

4.  No further neurologic recommendations.  Please call neurology on an as

needed basis.



Thank you for the opportunity to participate in the care of this patient.





__________________________________________

John Kinney MD



DD:  11/06/2017 10:23:07

DT:  11/06/2017 11:05:43

Job # [id number]

## 2017-11-06 NOTE — VASCLAB
PROCEDURE:  



HISTORY:

cva



COMPARISON:

None available. 



TECHNIQUE:

Grayscale and duplex Doppler evaluation of the cervical carotid and 

vertebral arteries were performed. The common carotid, carotid 

bifurcations and cervical Internal Carotid Artery (ICA) and proximal 

External Carotid Artery (ECA) were evaluated.  The vertebral arteries 

were evaluated for gross patency and flow direction. 



Report prepared by  Curtis Franco T 



FINDINGS:



RIGHT CAROTID ARTERIES:

1. Common Carotid Artery: Minimal plaque formation of the right CCA 

which does not result in hemodynamically significant stenosis. 

Maximum Peak Systolic velocity: 35.9 cm/sec: End-diastolic velocity 

8.4 cm/sec.



2. Carotid Bifurcation: Calcific plaque formation. Maximum Peak 

Systolic velocity: 30.6 cm/sec: End-diastolic velocity 7.3 cm/sec.  



3. Internal Carotid Artery:    Plaque description:   



3.1. Proximal Segment: Peak systolic velocity 46.5 cm/sec: 

End-diastolic velocity  20.4 cm/sec - % stenosis  0-30%



3.2. Middle Segment:    Peak systolic velocity 53.6 cm/sec: 

End-diastolic velocity  19.5  cm/sec - % stenosis 



3.3. Distal Segment:     Peak systolic velocity 59.2 cm/sec: 

End-diastolic velocity  26.0  cm/sec - % stenosis 



4. External Carotid Artery: No significant focal plaque formation. 

Peak systolic velocity 51.6 cm/sec



5. ICA/CCA Ratio: 1.49



LEFT CAROTID ARTERIES:

1. Common Carotid Artery: Mild plaque formation of the left proximal 

CCA which results in a hemodynamically significant stenosis. Maximum 

Peak Systolic velocity: 43.9 cm/sec: End-diastolic velocity 10.9 

cm/sec.



2. Carotid Bifurcation: Calcific plaque formation. Maximum Peak 

Systolic velocity: 27.0 cm/sec: End-diastolic velocity 7.8 cm/sec.



3. Internal Carotid Artery:      Plaque description: Heterogeneous 



3.1. Proximal Segment: Peak systolic velocity 59.6 cm/sec: 

End-diastolic velocity 22.6 cm/sec - % stenosis 0-30%



3.2. Middle Segment:    Peak systolic velocity 63.2 cm/sec: 

End-diastolic velocity 25.2 cm/sec - % stenosis 



3.3. Distal Segment:     Peak systolic velocity 61.3 cm/sec: 

End-diastolic velocity 22.4 cm/sec - % stenosis 



4. External Carotid Artery: No significant focal plaque formation. 

Peak systolic velocity 50.9 cm/sec



5. ICA/CCA Ratio: 1.43



VERTEBRAL ARTERIES:

1. Right Vertebral Artery: The right vertebral artery flow direction 

is  antegrade.



2. Left Vertebral Artery:   The left vertebral artery flow direction 

is antegrade.



OTHER FINDINGS:

1. Right Brachial Blood pressure:  mmHg.



2. Left Brachial Blood pressure:  mmHg.



IMPRESSION:

RIGHT:  Duplex scan does suggest less than 30% Non-hemodynamically 

stenosis of the right internal carotid artery.  



LEFT:  Duplex scan does suggest less than 30% Non-hemodynamically 

stenosis of the left internal carotid artery..

## 2017-11-06 NOTE — CP.PCM.PN
Subjective





- Date & Time of Evaluation


Date of Evaluation: 11/06/17


Time of Evaluation: 17:30





- Subjective


Subjective: 





Had been awaiting family to discuss with them dilemma of pt not wanting to eat 

despite exhortation from everyone. Pt currently has tube feeding via NGT, but 

this can only last for so long. Finally, patient's Power-of- and his 

mother came to visit and nurse on duty brought it to my attention..





Discussed with patient's daughter the problem of the patient not eating and 

refusing any tests to check whether she has the ability to swallow food. She 

will only take sustenance from family members. The family is also aware that 

the patient is refusing to have a peg installed and so the only option left to 

us is for them to come and be with the patient for all meals. I asked the nurse 

through social work that a schedule be set up for all of us to sit at a table 

and plan a course of action for this patient their mother. I In the meantime we 

will also call in palliative care for evaluation so that the mechanisms can be 

put in place in case something untoward happens when patient is discharged from 

the hospital. 





Objective





- Vital Signs/Intake and Output


Vital Signs (last 24 hours): 


 











Temp Pulse Resp BP Pulse Ox


 


 98.5 F   103 H  20   99/60 L  100 


 


 11/06/17 15:50  11/06/17 22:59  11/06/17 15:50  11/06/17 15:50  11/06/17 15:50








Intake and Output: 


 











 11/06/17 11/07/17





 18:59 06:59


 


Intake Total 611 710


 


Output Total 1350 600


 


Balance -739 110














- Medications


Medications: 


 Current Medications





Acetaminophen (Tylenol 325mg Tab)  325 mg PO Q4 PRN


   PRN Reason: Fever >100.4 F


Albuterol Sulfate (Albuterol 0.083% Inhal Sol (2.5 Mg/3 Ml) Ud)  2.5 mg IH RQ6 

[facility]


   Last Admin: 11/06/17 19:56 Dose:  Not Given


Aspirin (Ecotrin)  81 mg PO DAILY [facility]


   Last Admin: 11/06/17 10:36 Dose:  81 mg


Calcium/Vitamin D (Oyster Shell Calcium/Vitamin D 500 Mg-200 Iu)  1 tab PO TID 

[facility]


   Last Admin: 11/06/17 17:46 Dose:  1 tab


Carvedilol (Coreg)  6.25 mg PO BID [facility]


   Last Admin: 11/06/17 21:08 Dose:  6.25 mg


Dronabinol (Marinol)  2.5 mg PO BID [facility]


   Last Admin: 11/06/17 17:46 Dose:  2.5 mg


Enoxaparin Sodium (Lovenox)  50 mg SC Q12H [facility]


   Last Admin: 11/06/17 21:09 Dose:  50 mg


Hydromorphone HCl (Dilaudid)  0.5 mg IVP Q4H [facility]


   PRN Reason: Pain, Mild (1-3)


Meropenem 500 mg/ Dextrose  100 mls @ 100 mls/hr IVPB Q8H [facility]


   Last Admin: 11/06/17 16:20 Dose:  100 mls/hr


Potassium Phosphate 8.25 mmole (/ Sodium Chloride)  1,002.75 mls @ 30 mls/hr IV 

.Q24H [facility]


   Last Admin: 11/06/17 17:46 Dose:  30 mls/hr


Insulin Human Regular (Novolin R)  0 unit SC ACHS [facility]


   PRN Reason: Protocol


   Last Admin: 11/06/17 21:33 Dose:  Not Given


Lactulose (Enulose)  20 gm PO Q12H [facility]


   Last Admin: 11/06/17 17:46 Dose:  20 gm


Metoclopramide HCl (Reglan)  5 mg IVP HS [facility]


   Last Admin: 11/06/17 21:09 Dose:  5 mg


Ondansetron HCl (Zofran Inj)  4 mg IVP Q4 PRN


   PRN Reason: Nausea/Vomiting


Pantoprazole Sodium (Protonix Inj)  40 mg IVP DAILY [facility]


   Last Admin: 11/06/17 10:35 Dose:  40 mg


Potassium Chloride (Potassium Chloride Oral Soln)  40 meq PO Q12 [facility]


   Last Admin: 11/06/17 21:08 Dose:  40 meq


Rosuvastatin Calcium (Crestor)  5 mg PO HS [facility]


   Last Admin: 11/06/17 21:08 Dose:  5 mg











- Labs


Labs: 


 





 11/06/17 07:36 





 11/05/17 07:50 





 











PT  12.5 SECONDS (9.7-12.2)  H  11/06/17  07:36    


 


INR  1.1   11/06/17  07:36    


 


APTT  34 SECONDS (21-34)   11/06/17  07:36    














- Constitutional


Appears: No Acute Distress





- Head Exam


Head Exam: NORMAL INSPECTION





- Eye Exam


Eye Exam: Normal appearance


Pupil Exam: NORMAL ACCOMODATION





- ENT Exam


ENT Exam: Mucous Membranes Moist, Normal Exam





- Neck Exam


Neck Exam: Normal Inspection





- Respiratory Exam


Respiratory Exam: Clear to Ausculation Bilateral, NORMAL BREATHING PATTERN





- Cardiovascular Exam


Cardiovascular Exam: REGULAR RHYTHM





- GI/Abdominal Exam


GI & Abdominal Exam: Normal Bowel Sounds





- Rectal Exam


Rectal Exam: Deferred





- Extremities Exam


Extremities Exam: Normal Capillary Refill, Normal Inspection





- Back Exam


Back Exam: NORMAL INSPECTION





- Neurological Exam


Neurological Exam: Alert, Awake, CN II-XII Intact, Oriented x3





- Psychiatric Exam


Psychiatric exam: Depressed, Flat Affect





- Skin


Skin Exam: Dry, Intact, Normal Color, Warm





Assessment and Plan


(1) Diabetes mellitus


Assessment & Plan: 


stable, no worsening


Status: Chronic   





(2) Malnutrition following gastrointestinal surgery


Assessment & Plan: 


pt refusing to eat and in discussion with family as to how ene want to proceed. 

Suspect pt willfully refusing to eat. Will consult psychiatry as to possible 

approaches to this issue. 


Status: Chronic   





(3) Bacteremia


Assessment & Plan: 


on IV abx and to continue for 6 weeks. Pt currently on 3rd week. 


Status: Acute

## 2017-11-06 NOTE — CP.PCM.PN
Subjective





- Date & Time of Evaluation


Date of Evaluation: 11/06/17


Time of Evaluation: 17:24





- Subjective


Subjective: 





COVERING DR BATEMAN





Still no decision from family concerning PEG placement and awaiting preferred 

route in view of recent surgery and presence of new incision midline


NGT in place for now. 





Objective





- Vital Signs/Intake and Output


Vital Signs (last 24 hours): 


 











Temp Pulse Resp BP Pulse Ox


 


 98.5 F   120 H  20   99/60 L  100 


 


 11/06/17 15:50  11/06/17 15:50  11/06/17 15:50  11/06/17 15:50  11/06/17 15:50








Intake and Output: 


 











 11/06/17 11/06/17





 06:59 18:59


 


Intake Total 310 611


 


Output Total 1100 1350


 


Balance -790 -739














- Medications


Medications: 


 Current Medications





Acetaminophen (Tylenol 325mg Tab)  325 mg PO Q4 PRN


   PRN Reason: Fever >100.4 F


Albuterol Sulfate (Albuterol 0.083% Inhal Sol (2.5 Mg/3 Ml) Ud)  2.5 mg IH RQ6 

[facility]


   Last Admin: 11/06/17 13:22 Dose:  2.5 mg


Aspirin (Ecotrin)  81 mg PO DAILY [facility]


   Last Admin: 11/06/17 10:36 Dose:  81 mg


Calcium/Vitamin D (Oyster Shell Calcium/Vitamin D 500 Mg-200 Iu)  1 tab PO TID 

[facility]


   Last Admin: 11/06/17 13:40 Dose:  1 tab


Carvedilol (Coreg)  6.25 mg PO BID [facility]


Dronabinol (Marinol)  2.5 mg PO BID [facility]


   Last Admin: 11/06/17 10:36 Dose:  2.5 mg


Enoxaparin Sodium (Lovenox)  50 mg SC Q12H [facility]


   Last Admin: 11/06/17 10:35 Dose:  50 mg


Furosemide (Lasix)  20 mg IVP DAILY [facility]


   Last Admin: 11/06/17 10:37 Dose:  20 mg


Meropenem 500 mg/ Dextrose  100 mls @ 100 mls/hr IVPB Q8H [facility]


   Last Admin: 11/06/17 16:20 Dose:  100 mls/hr


Potassium Phosphate 8.25 mmole (/ Sodium Chloride)  1,002.75 mls @ 30 mls/hr IV 

.Q24H [facility]


   Last Admin: 11/05/17 16:45 Dose:  Not Given


Insulin Human Regular (Novolin R)  0 unit SC ACHS [facility]


   PRN Reason: Protocol


   Last Admin: 11/06/17 13:37 Dose:  3 unit


Lactulose (Enulose)  20 gm PO Q12H [facility]


   Last Admin: 11/06/17 04:51 Dose:  20 gm


Metoclopramide HCl (Reglan)  5 mg IVP HS [facility]


   Last Admin: 11/05/17 21:38 Dose:  5 mg


Ondansetron HCl (Zofran Inj)  4 mg IVP Q4 PRN


   PRN Reason: Nausea/Vomiting


Pantoprazole Sodium (Protonix Inj)  40 mg IVP DAILY [facility]


   Last Admin: 11/06/17 10:35 Dose:  40 mg


Potassium Chloride (Potassium Chloride Oral Soln)  40 meq PO Q12 [facility]


   Last Admin: 11/06/17 10:35 Dose:  40 meq


Rosuvastatin Calcium (Crestor)  5 mg PO HS [facility]


   Last Admin: 11/05/17 21:38 Dose:  5 mg











- Labs


Labs: 


 





 11/06/17 07:36 





 11/05/17 07:50 





 











PT  12.5 SECONDS (9.7-12.2)  H  11/06/17  07:36    


 


INR  1.1   11/06/17  07:36    


 


APTT  34 SECONDS (21-34)   11/06/17  07:36    














- Constitutional


Appears: No Acute Distress





- Head Exam


Head Exam: ATRAUMATIC, NORMOCEPHALIC





- Respiratory Exam


Respiratory Exam: Decreased Breath Sounds





- Cardiovascular Exam


Cardiovascular Exam: REGULAR RHYTHM





- GI/Abdominal Exam


GI & Abdominal Exam: Soft, Hypoactive Bowel Sounds.  absent: Distended, Guarding

, Tenderness, Mass, Rebound





Assessment and Plan


(1) Fecal impaction


Status: Resolved   





(2) Malnutrition following gastrointestinal surgery


Assessment & Plan: 


Receiving NGT feedings for now


Consideration for PEG vs surgical gastrostomy if family agreeable in view of 

midline incision


Awaiting advisement


Will discuss with Dr Bateman upon his return on Wednesday.





Status: Chronic   





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


Status: Chronic   





(4) Dysphagia as late effect of cerebrovascular accident (CVA)


Status: Chronic

## 2017-11-06 NOTE — CP.PCM.PN
Subjective





- Date & Time of Evaluation


Date of Evaluation: 11/06/17


Time of Evaluation: 07:00





- Subjective


Subjective: 


SURGERY PROGRESS NOTE FOR DR. GARNER





Patient seen and examined at bedside. Per nurse, patient shakes head no when 

offered food from tray. Patient currently on tube feeds via NG tube. Family 

currently at bedside and patient ate small amount of apple sauce. Family to 

decide regarding possible PEG.





Objective





- Vital Signs/Intake and Output


Vital Signs (last 24 hours): 


 











Temp Pulse Resp BP Pulse Ox


 


 98.1 F   116 H  20   103/66   99 


 


 11/06/17 08:53  11/06/17 08:53  11/06/17 08:53  11/06/17 10:37  11/06/17 08:53








Intake and Output: 


 











 11/06/17 11/06/17





 06:59 18:59


 


Intake Total 310 


 


Output Total 1100 


 


Balance -790 














- Medications


Medications: 


 Current Medications





Acetaminophen (Tylenol 325mg Tab)  325 mg PO Q4 PRN


   PRN Reason: Fever >100.4 F


Albuterol Sulfate (Albuterol 0.083% Inhal Sol (2.5 Mg/3 Ml) Ud)  2.5 mg IH RQ6 

[facility]


   Last Admin: 11/06/17 06:50 Dose:  2.5 mg


Aspirin (Ecotrin)  81 mg PO DAILY [facility]


   Last Admin: 11/06/17 10:36 Dose:  81 mg


Calcium/Vitamin D (Oyster Shell Calcium/Vitamin D 500 Mg-200 Iu)  1 tab PO TID 

[facility]


   Last Admin: 11/06/17 10:36 Dose:  1 tab


Carvedilol (Coreg)  6.25 mg PO BID [facility]


Dronabinol (Marinol)  2.5 mg PO BID [facility]


   Last Admin: 11/06/17 10:36 Dose:  2.5 mg


Enoxaparin Sodium (Lovenox)  50 mg SC Q12H [facility]


   Last Admin: 11/06/17 10:35 Dose:  50 mg


Furosemide (Lasix)  20 mg IVP DAILY [facility]


   Last Admin: 11/06/17 10:37 Dose:  20 mg


Hydromorphone HCl (Dilaudid)  0.5 mg IVP Q4H PRN


   PRN Reason: Pain, moderate (4-7)


   Last Admin: 11/06/17 08:15 Dose:  0.5 mg


Meropenem 500 mg/ Dextrose  100 mls @ 100 mls/hr IVPB Q8H [facility]


   Last Admin: 11/06/17 09:12 Dose:  100 mls/hr


Potassium Phosphate 8.25 mmole (/ Sodium Chloride)  1,002.75 mls @ 30 mls/hr IV 

.Q24H [facility]


   Last Admin: 11/05/17 16:45 Dose:  Not Given


Insulin Human Regular (Novolin R)  0 unit SC ACHS [facility]


   PRN Reason: Protocol


   Last Admin: 11/06/17 08:12 Dose:  2 unit


Lactulose (Enulose)  20 gm PO Q12H [facility]


   Last Admin: 11/06/17 04:51 Dose:  20 gm


Metoclopramide HCl (Reglan)  5 mg IVP HS [facility]


   Last Admin: 11/05/17 21:38 Dose:  5 mg


Ondansetron HCl (Zofran Inj)  4 mg IVP Q4 PRN


   PRN Reason: Nausea/Vomiting


Pantoprazole Sodium (Protonix Inj)  40 mg IVP DAILY [facility]


   Last Admin: 11/06/17 10:35 Dose:  40 mg


Potassium Chloride (Potassium Chloride Oral Soln)  40 meq PO Q12 [facility]


   Last Admin: 11/06/17 10:35 Dose:  40 meq


Rosuvastatin Calcium (Crestor)  5 mg PO HS [facility]


   Last Admin: 11/05/17 21:38 Dose:  5 mg











- Labs


Labs: 


 





 11/06/17 07:36 





 11/05/17 07:50 





 











PT  12.5 SECONDS (9.7-12.2)  H  11/06/17  07:36    


 


INR  1.1   11/06/17  07:36    


 


APTT  34 SECONDS (21-34)   11/06/17  07:36    














- Constitutional


Appears: Non-toxic, No Acute Distress, Chronically Ill





- Head Exam


Head Exam: ATRAUMATIC, NORMAL INSPECTION





- Respiratory Exam


Respiratory Exam: NORMAL BREATHING PATTERN.  absent: Respiratory Distress





- Cardiovascular Exam


Cardiovascular Exam: +S1, +S2





- GI/Abdominal Exam


GI & Abdominal Exam: Firm (lower part of midline incision), Soft, Tenderness (

mild tenderness at incision).  absent: Distended, Guarding, Rigid





- Neurological Exam


Neurological Exam: Alert, Awake





- Psychiatric Exam


Psychiatric exam: Normal Affect, Normal Mood





- Skin


Skin Exam: Dry, Warm





Assessment and Plan





- Assessment and Plan (Free Text)


Assessment: 


77 y/o F w/ uterine infection, L adenexal mass POD#11 s/p KASSY BSO





- Afebrile, continues to be mildly tachy


- Will keep staples in place for 14 days post op


- Cont abx per ID


- Encouraged OOB to chair/IS use


- GI/DVT PPx


- Family to decide regarding PEG





Plan discussed w/ Dr. Radha Eaton PGY-3

## 2017-11-06 NOTE — CP.PCM.PN
Subjective





- Date & Time of Evaluation


Date of Evaluation: 11/06/17


Time of Evaluation: 22:33





- Subjective


Subjective: 





Pt is asleep, breathing comfortably. hear rate has been sinus tachycardia. Echo 

revealed diffuse moderately reduced LV dysfunction. Beta blocker ws ordered for 

sinus tach and LV dysfunction, but was not given  due to hypotension, with BP 

systolic in the 80's. After IV fluids, Bp has come up a bit. Her nurse says the 

patient is mostly non verbal, will mostly report pain at times





Objective





- Vital Signs/Intake and Output


Vital Signs (last 24 hours): 


 











Temp Pulse Resp BP Pulse Ox


 


 98.5 F   128 H  20   99/60 L  100 


 


 11/06/17 15:50  11/06/17 16:00  11/06/17 15:50  11/06/17 15:50  11/06/17 15:50








Intake and Output: 


 











 11/06/17 11/07/17





 18:59 06:59


 


Intake Total 611 


 


Output Total 1350 600


 


Balance -739 -600














- Medications


Medications: 


 Current Medications





Acetaminophen (Tylenol 325mg Tab)  325 mg PO Q4 PRN


   PRN Reason: Fever >100.4 F


Albuterol Sulfate (Albuterol 0.083% Inhal Sol (2.5 Mg/3 Ml) Ud)  2.5 mg IH RQ6 

[facility]


   Last Admin: 11/06/17 19:56 Dose:  Not Given


Aspirin (Ecotrin)  81 mg PO DAILY [facility]


   Last Admin: 11/06/17 10:36 Dose:  81 mg


Calcium/Vitamin D (Oyster Shell Calcium/Vitamin D 500 Mg-200 Iu)  1 tab PO TID 

[facility]


   Last Admin: 11/06/17 17:46 Dose:  1 tab


Carvedilol (Coreg)  6.25 mg PO BID [facility]


   Last Admin: 11/06/17 21:08 Dose:  6.25 mg


Dronabinol (Marinol)  2.5 mg PO BID [facility]


   Last Admin: 11/06/17 17:46 Dose:  2.5 mg


Enoxaparin Sodium (Lovenox)  50 mg SC Q12H [facility]


   Last Admin: 11/06/17 21:09 Dose:  50 mg


Hydromorphone HCl (Dilaudid)  0.5 mg IVP Q4H PRN


   PRN Reason: Pain, Mild (1-3)


Meropenem 500 mg/ Dextrose  100 mls @ 100 mls/hr IVPB Q8H [facility]


   Last Admin: 11/06/17 16:20 Dose:  100 mls/hr


Potassium Phosphate 8.25 mmole (/ Sodium Chloride)  1,002.75 mls @ 30 mls/hr IV 

.Q24H [facility]


   Last Admin: 11/06/17 17:46 Dose:  30 mls/hr


Insulin Human Regular (Novolin R)  0 unit SC ACHS [facility]


   PRN Reason: Protocol


   Last Admin: 11/06/17 21:33 Dose:  Not Given


Lactulose (Enulose)  20 gm PO Q12H [facility]


   Last Admin: 11/06/17 17:46 Dose:  20 gm


Metoclopramide HCl (Reglan)  5 mg IVP HS [facility]


   Last Admin: 11/06/17 21:09 Dose:  5 mg


Ondansetron HCl (Zofran Inj)  4 mg IVP Q4 PRN


   PRN Reason: Nausea/Vomiting


Pantoprazole Sodium (Protonix Inj)  40 mg IVP DAILY [facility]


   Last Admin: 11/06/17 10:35 Dose:  40 mg


Potassium Chloride (Potassium Chloride Oral Soln)  40 meq PO Q12 [facility]


   Last Admin: 11/06/17 21:08 Dose:  40 meq


Rosuvastatin Calcium (Crestor)  5 mg PO HS [facility]


   Last Admin: 11/06/17 21:08 Dose:  5 mg











- Labs


Labs: 


 





 11/06/17 07:36 





 11/05/17 07:50 





 











PT  12.5 SECONDS (9.7-12.2)  H  11/06/17  07:36    


 


INR  1.1   11/06/17  07:36    


 


APTT  34 SECONDS (21-34)   11/06/17  07:36    














- Constitutional


Appears: Chronically Ill





- Head Exam


Head Exam: ATRAUMATIC





- Eye Exam


Eye Exam: EOMI, Normal appearance





- ENT Exam


ENT Exam: Mucous Membranes Dry





- Neck Exam


Neck Exam: Full ROM





- Respiratory Exam


Respiratory Exam: Clear to Ausculation Bilateral





- Cardiovascular Exam


Cardiovascular Exam: REGULAR RHYTHM





- GI/Abdominal Exam


GI & Abdominal Exam: Hypoactive Bowel Sounds





-  Exam


External exam: NORMAL EXTERNAL EXAM





- Extremities Exam


Extremities Exam: Normal Inspection





- Back Exam


Back Exam: NORMAL INSPECTION





- Neurological Exam


Neurological Exam: Alert, Awake





- Psychiatric Exam


Psychiatric exam: Normal Affect, Normal Mood





- Skin


Skin Exam: Normal Color





Assessment and Plan





- Assessment and Plan (Free Text)


Assessment: 





1. Although LV Ef is moderately reduced, there are no clinical signs of CHF. 

Etiology of cardiomyopathy is unknown, but cannot be worked up at this time as 

pt is too ill. If BP allows, medical therapy with beta clocker and ace 

inhibitor are advised. Caution too much and too fast IV fluids as CHF may 

result.

## 2017-11-07 LAB
BASOPHILS # BLD AUTO: 0.1 K/UL (ref 0–0.2)
BASOPHILS NFR BLD: 1.2 % (ref 0–2)
BUN SERPL-MCNC: 13 MG/DL (ref 7–17)
CALCIUM SERPL-MCNC: 7.5 MG/DL (ref 8.6–10.4)
CHLORIDE SERPL-SCNC: 98 MMOL/L (ref 98–107)
CO2 SERPL-SCNC: 25 MMOL/L (ref 22–30)
EOSINOPHIL # BLD AUTO: 0.3 K/UL (ref 0–0.7)
EOSINOPHIL NFR BLD: 4 % (ref 0–4)
ERYTHROCYTE [DISTWIDTH] IN BLOOD BY AUTOMATED COUNT: 13.8 % (ref 11.5–14.5)
GLUCOSE SERPL-MCNC: 115 MG/DL (ref 65–105)
HCT VFR BLD CALC: 26.1 % (ref 34–47)
LYMPHOCYTES # BLD AUTO: 0.8 K/UL (ref 1–4.3)
LYMPHOCYTES NFR BLD AUTO: 13 % (ref 20–40)
MAGNESIUM SERPL-MCNC: 1.8 MG/DL (ref 1.6–2.3)
MCH RBC QN AUTO: 30.1 PG (ref 27–31)
MCHC RBC AUTO-ENTMCNC: 33.6 G/DL (ref 33–37)
MCV RBC AUTO: 89.6 FL (ref 81–99)
MONOCYTES # BLD: 0.4 K/UL (ref 0–0.8)
MONOCYTES NFR BLD: 6 % (ref 0–10)
NRBC BLD AUTO-RTO: 0 % (ref 0–2)
PHOSPHATE SERPL-MCNC: 2.4 MG/DL (ref 2.5–4.5)
PLATELET # BLD: 171 K/UL (ref 130–400)
PMV BLD AUTO: 9.7 FL (ref 7.2–11.7)
POTASSIUM SERPL-SCNC: 5.1 MMOL/L (ref 3.6–5.2)
SODIUM SERPL-SCNC: 128 MMOL/L (ref 132–148)
WBC # BLD AUTO: 6.5 K/UL (ref 4.8–10.8)

## 2017-11-07 RX ADMIN — HUMAN INSULIN SCH: 100 INJECTION, SOLUTION SUBCUTANEOUS at 17:48

## 2017-11-07 RX ADMIN — CALCIUM CARBONATE-VITAMIN D TAB 500 MG-200 UNIT SCH TAB: 500-200 TAB at 17:48

## 2017-11-07 RX ADMIN — HUMAN INSULIN SCH UNIT: 100 INJECTION, SOLUTION SUBCUTANEOUS at 11:11

## 2017-11-07 RX ADMIN — ALBUTEROL SULFATE SCH: 2.5 SOLUTION RESPIRATORY (INHALATION) at 02:35

## 2017-11-07 RX ADMIN — SODIUM CHLORIDE SCH MLS/HR: 0.9 INJECTION, SOLUTION INTRAVENOUS at 08:14

## 2017-11-07 RX ADMIN — SODIUM CHLORIDE SCH MLS/HR: 0.9 INJECTION, SOLUTION INTRAVENOUS at 00:39

## 2017-11-07 RX ADMIN — HUMAN INSULIN SCH: 100 INJECTION, SOLUTION SUBCUTANEOUS at 21:12

## 2017-11-07 RX ADMIN — POTASSIUM CHLORIDE SCH MEQ: 1.5 SOLUTION ORAL at 10:45

## 2017-11-07 RX ADMIN — CALCIUM CARBONATE-VITAMIN D TAB 500 MG-200 UNIT SCH TAB: 500-200 TAB at 13:02

## 2017-11-07 RX ADMIN — SODIUM CHLORIDE SCH: 0.9 INJECTION, SOLUTION INTRAVENOUS at 20:24

## 2017-11-07 RX ADMIN — HYDROMORPHONE HYDROCHLORIDE PRN MG: 1 INJECTION, SOLUTION INTRAMUSCULAR; INTRAVENOUS; SUBCUTANEOUS at 01:25

## 2017-11-07 RX ADMIN — ALBUTEROL SULFATE SCH: 2.5 SOLUTION RESPIRATORY (INHALATION) at 08:20

## 2017-11-07 RX ADMIN — ALBUTEROL SULFATE SCH: 2.5 SOLUTION RESPIRATORY (INHALATION) at 13:35

## 2017-11-07 RX ADMIN — ENOXAPARIN SODIUM SCH MG: 60 INJECTION SUBCUTANEOUS at 21:11

## 2017-11-07 RX ADMIN — POTASSIUM CHLORIDE SCH: 1.5 SOLUTION ORAL at 21:12

## 2017-11-07 RX ADMIN — ALBUTEROL SULFATE SCH: 2.5 SOLUTION RESPIRATORY (INHALATION) at 19:00

## 2017-11-07 RX ADMIN — HUMAN INSULIN SCH: 100 INJECTION, SOLUTION SUBCUTANEOUS at 07:23

## 2017-11-07 RX ADMIN — CALCIUM CARBONATE-VITAMIN D TAB 500 MG-200 UNIT SCH TAB: 500-200 TAB at 10:44

## 2017-11-07 RX ADMIN — HYDROMORPHONE HYDROCHLORIDE PRN MG: 1 INJECTION, SOLUTION INTRAMUSCULAR; INTRAVENOUS; SUBCUTANEOUS at 08:39

## 2017-11-07 RX ADMIN — ENOXAPARIN SODIUM SCH MG: 60 INJECTION SUBCUTANEOUS at 10:45

## 2017-11-07 RX ADMIN — SODIUM CHLORIDE SCH MLS/HR: 0.9 INJECTION, SOLUTION INTRAVENOUS at 17:50

## 2017-11-07 NOTE — CP.PCM.PN
Subjective





- Date & Time of Evaluation


Date of Evaluation: 11/07/17


Time of Evaluation: 07:00





- Subjective


Subjective: 


SURGERY PROGRESS NOTE FOR DR. GARNER





Patient seen and examined at bedside. Still awaiting family decision regarding 

possible PEG as patient refuses to eat unless family members are present. 

Currently receiving tube feeds via NG tube





Objective





- Vital Signs/Intake and Output


Vital Signs (last 24 hours): 


 











Temp Pulse Resp BP Pulse Ox


 


 98.8 F   112 H  20   96/63 L  100 


 


 11/07/17 08:05  11/07/17 08:05  11/07/17 08:05  11/07/17 08:05  11/07/17 08:05








Intake and Output: 


 











 11/07/17 11/07/17





 06:59 18:59


 


Intake Total 950 


 


Output Total 1600 


 


Balance -650 














- Medications


Medications: 


 Current Medications





Acetaminophen (Tylenol 325mg Tab)  325 mg PO Q4 PRN


   PRN Reason: Fever >100.4 F


Albuterol Sulfate (Albuterol 0.083% Inhal Sol (2.5 Mg/3 Ml) Ud)  2.5 mg IH RQ6 

[facility]


   Last Admin: 11/07/17 08:20 Dose:  Not Given


Aspirin (Ecotrin)  81 mg PO DAILY [facility]


   Last Admin: 11/06/17 10:36 Dose:  81 mg


Calcium/Vitamin D (Oyster Shell Calcium/Vitamin D 500 Mg-200 Iu)  1 tab PO TID 

[facility]


   Last Admin: 11/06/17 17:46 Dose:  1 tab


Carvedilol (Coreg)  6.25 mg PO BID [facility]


   Last Admin: 11/06/17 21:08 Dose:  6.25 mg


Dronabinol (Marinol)  2.5 mg PO BID [facility]


   Last Admin: 11/06/17 17:46 Dose:  2.5 mg


Enoxaparin Sodium (Lovenox)  50 mg SC Q12H [facility]


   Last Admin: 11/06/17 21:09 Dose:  50 mg


Hydromorphone HCl (Dilaudid)  0.5 mg IVP Q4H [facility]


   PRN Reason: Pain, Mild (1-3)


   Last Admin: 11/07/17 08:39 Dose:  0.5 mg


Meropenem 500 mg/ Dextrose  100 mls @ 100 mls/hr IVPB Q8H [facility]


   Last Admin: 11/07/17 08:14 Dose:  100 mls/hr


Potassium Phosphate 8.25 mmole (/ Sodium Chloride)  1,002.75 mls @ 30 mls/hr IV 

.Q24H [facility]


   Last Admin: 11/06/17 17:46 Dose:  30 mls/hr


Insulin Human Regular (Novolin R)  0 unit SC ACHS [facility]


   PRN Reason: Protocol


   Last Admin: 11/07/17 07:23 Dose:  Not Given


Lactulose (Enulose)  20 gm PO Q12H [facility]


   Last Admin: 11/07/17 06:15 Dose:  20 gm


Metoclopramide HCl (Reglan)  5 mg IVP HS [facility]


   Last Admin: 11/06/17 21:09 Dose:  5 mg


Ondansetron HCl (Zofran Inj)  4 mg IVP Q4 PRN


   PRN Reason: Nausea/Vomiting


Pantoprazole Sodium (Protonix Inj)  40 mg IVP DAILY [facility]


   Last Admin: 11/06/17 10:35 Dose:  40 mg


Potassium Chloride (Potassium Chloride Oral Soln)  40 meq PO Q12 [facility]


   Last Admin: 11/06/17 21:08 Dose:  40 meq


Rosuvastatin Calcium (Crestor)  5 mg PO [facility]


   Last Admin: 11/06/17 21:08 Dose:  5 mg











- Labs


Labs: 


 





 11/07/17 07:28 





 11/07/17 07:28 





 











PT  12.5 SECONDS (9.7-12.2)  H  11/06/17  07:36    


 


INR  1.1   11/06/17  07:36    


 


APTT  34 SECONDS (21-34)   11/06/17  07:36    














- Constitutional


Appears: Non-toxic, No Acute Distress





- Head Exam


Head Exam: ATRAUMATIC, NORMAL INSPECTION





- Respiratory Exam


Respiratory Exam: NORMAL BREATHING PATTERN.  absent: Respiratory Distress





- Cardiovascular Exam


Cardiovascular Exam: Tachycardia, +S1, +S2





- GI/Abdominal Exam


GI & Abdominal Exam: Firm (firm in inferior portion of midline incision), Soft, 

Tenderness (mild tenderness).  absent: Distended, Guarding, Rigid, Rebound


Additional comments: 


staples in place





- Neurological Exam


Neurological Exam: Alert, Awake





- Psychiatric Exam


Psychiatric exam: Normal Affect, Normal Mood





- Skin


Skin Exam: Dry, Normal Color, Warm





Assessment and Plan





- Assessment and Plan (Free Text)


Assessment: 


77 y/o F w/ uterine infection, L adenexal mass POD#12 s/p KASSY BSO





- Afebrile, continues to be mildly tachy


- Anemia


- Will keep staples in place for 14 days post op


- Cont abx per ID


- Encouraged OOB to chair/IS use


- GI/DVT PPx


- Family to decide regarding PEG





Plan discussed w/ Dr. Radha Eaton PGY-3

## 2017-11-07 NOTE — CON
DATE:  11/08/2017



The patient was seen today with official translating services of [facility] also with staff, the patient speaks [place].



CHIEF COMPLAINT AND REASON FOR CONSULTATION:  The patient referred by Dr. Rickey Collazo for evaluation of possible depression.  The patient refusing

to eat and also states she has no appetite.



HISTORY OF PRESENT ILLNESS:  This is a case of a 76-year-old female of

[place] descent.  The patient was admitted here from the nursing home. 

According to the chart, she is a long time resident at the [facility] the 
[facility].

  The patient on the chart noted that she was having abdominal pain and

constipation.  The patient was referred for evaluation as the patient has

been refusing.to eat.  She states she has lost her appetite for a long time and

that she does not feel hungry.  The patient is currently with NG tube, but

still refusing to eat.  Family is telling the staff that they will try to

feed her.  The patient is always refusing to eat, except when the family is

there, and she also only wants [place] food.  The patient states that

she wants to go home with her daughter and she states that she is feeling

sad being in the hospital, especially with all her medical problems.  The

patient recently had hysterectomy and also bilateral salpingo-oophorectomy.

The patient was seen today, seems to be depressed, and with the ,

she has periods of confusion.



PAST PSYCHIATRIC HISTORY:  Denies any.



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

dysphagia, urinary tract infection, history of anemia.



ALLERGIES:  THE PATIENT HAS NO KNOWN ALLERGY.



PERSONAL HISTORY:  Denies any psychosocial history.  The patient was living

in a nursing home.  The patient states she used to live with her daughter.



MEDICATIONS:  Include albuterol, Coreg, Crestor, Dilaudid, Marinol 2.5 mg

b.i.d.  The patient is on meropenem.  The patient also on insulin,

pantoprazole, Reglan, and also on Tylenol and Zofran.



PHYSICAL EXAMINATION

VITAL SIGNS:  Temperature is 98.8, pulse rate is 112, blood pressure is low

96/63, respirations 20.  Oxygen saturation is 100% on room air.



REVIEW OF SYSTEMS:

GENERAL:  The patient is alert but feeling weak, was seen with translating

services.  The patient is refusing to eat.  She has NG-tube.  She says she

wants to go home, she wants to be with her daughter.

SKIN:  No diaphoresis.

HEENT:  No headache.

NECK:  Supple.

RESPIRATORY:  No dyspnea.

CARDIOPULMONARY:  No chest pain.

ABDOMEN:  Appetite is very poor.  The patient has NGT.  No nausea.  No

vomiting.

EXTREMITIES:  The patient is moving extremities.

MUSCULOSKELETAL:  Feels weak.

NEUROLOGIC:  Alert, but feeling weak with periods of confusion.



MENTAL STATUS EXAMINATION:  Elderly female of [place] descent, oriented

to place and person, seen with .  Mood is depressed, dysphoric,

somatic.  Affect restricted.  Speech is slow.  Thought process confused at

times.  Thought content:  The patient is refusing to eat.  She states she

wants to go home to be with her daughter.  The patient is also refusing PEG

tube.  No overt psychosis.  No suicidal ideation.  Attention and memory

seem to be limited.  Insight and judgment limited.  Impulse control is fair

at this time.



IMPRESSION:

1.  Depressive disorder, not otherwise specified.

2.  Mood disorder sec to medical condition,].



RECOMMENDATIONS:  The patient is seen, meds reviewed.  We will continue the

Marinol 2.5 mg p.o. b.i.d.  We will add Remeron 7.5 mg at bedtime.  The

patient seems to have significant clinical symptoms of depression related

to her medical problems.  The patient also expressing desire that she wants

to go home with her daughter and does not want to stay in the nursing home.

According to the staff, the family is trying to feed her with [place]an

food, and the patient also wants to eat [place]an food, but the patient

may benefit from a trial of antidepressant, especially Remeron that will

hopefully improve her appetite together with the Marinol.



Review of her labs, her TSH is 0.46 which is within normal limits.



Thank you very much for the consult.







__________________________________________

Shubham Lock MD





DD:  11/07/2017 10:31:53

DT:  11/07/2017 12:35:57

Job # [id number]





SANDY

## 2017-11-07 NOTE — CARD
--------------- APPROVED REPORT --------------





EKG Measurement

Heart [id number]

AL [id number]

[id number]

[id number]

[id number]



<Conclusion>

Sinus tachycardia

Borderline low voltage.

Nonspecific T wave changes.

## 2017-11-07 NOTE — CP.PCM.PN
Subjective





- Date & Time of Evaluation


Date of Evaluation: 11/07/17


Time of Evaluation: 14:02





- Subjective


Subjective: 





The pt is lethargic, not eating, refusing to eat. 





Objective





- Vital Signs/Intake and Output


Vital Signs (last 24 hours): 


 











Temp Pulse Resp BP Pulse Ox


 


 98.8 F   112 H  20   96/63 L  100 


 


 11/07/17 08:05  11/07/17 08:05  11/07/17 08:05  11/07/17 08:05  11/07/17 08:05








Intake and Output: 


 











 11/07/17 11/07/17





 06:59 18:59


 


Intake Total 950 


 


Output Total 1600 


 


Balance -650 














- Medications


Medications: 


 Current Medications





Acetaminophen (Tylenol 325mg Tab)  325 mg PO Q4 PRN


   PRN Reason: Fever >100.4 F


Albuterol Sulfate (Albuterol 0.083% Inhal Sol (2.5 Mg/3 Ml) Ud)  2.5 mg IH RQ6 

[facility]


   Last Admin: 11/07/17 13:35 Dose:  Not Given


Aspirin (Ecotrin)  81 mg PO DAILY [facility]


   Last Admin: 11/07/17 10:45 Dose:  81 mg


Calcium/Vitamin D (Oyster Shell Calcium/Vitamin D 500 Mg-200 Iu)  1 tab PO TID 

[facility]


   Last Admin: 11/07/17 13:02 Dose:  1 tab


Carvedilol (Coreg)  6.25 mg PO BID [facility]


   Last Admin: 11/07/17 10:44 Dose:  6.25 mg


Dronabinol (Marinol)  2.5 mg PO BID [facility]


   Last Admin: 11/07/17 10:45 Dose:  2.5 mg


Enoxaparin Sodium (Lovenox)  50 mg SC Q12H [facility]


   Last Admin: 11/07/17 10:45 Dose:  50 mg


Hydromorphone HCl (Dilaudid)  0.5 mg IVP Q4H [facility]


   PRN Reason: Pain, Mild (1-3)


   Last Admin: 11/07/17 08:39 Dose:  0.5 mg


Meropenem 500 mg/ Dextrose  100 mls @ 100 mls/hr IVPB Q8H [facility]


   Last Admin: 11/07/17 08:14 Dose:  100 mls/hr


Potassium Phosphate 8.25 mmole (/ Sodium Chloride)  1,002.75 mls @ 30 mls/hr IV 

.Q24H [facility]


   Last Admin: 11/06/17 17:46 Dose:  30 mls/hr


Insulin Human Regular (Novolin R)  0 unit SC ACHS [facility]


   PRN Reason: Protocol


   Last Admin: 11/07/17 11:11 Dose:  3 unit


Lactulose (Enulose)  20 gm PO Q12H [facility]


   Last Admin: 11/07/17 06:15 Dose:  20 gm


Metoclopramide HCl (Reglan)  5 mg IVP HS [facility]


   Last Admin: 11/06/17 21:09 Dose:  5 mg


Mirtazapine (Remeron)  7.5 mg PO HS [facility]


Ondansetron HCl (Zofran Inj)  4 mg IVP Q4 PRN


   PRN Reason: Nausea/Vomiting


Pantoprazole Sodium (Protonix Inj)  40 mg IVP DAILY [facility]


   Last Admin: 11/07/17 10:45 Dose:  40 mg


Potassium Chloride (Potassium Chloride Oral Soln)  40 meq PO Q12 [facility]


   Last Admin: 11/07/17 10:45 Dose:  40 meq


Rosuvastatin Calcium (Crestor)  5 mg PO HS [facility]


   Last Admin: 11/06/17 21:08 Dose:  5 mg











- Labs


Labs: 


 





 11/07/17 07:28 





 11/07/17 07:28 





 











PT  12.5 SECONDS (9.7-12.2)  H  11/06/17  07:36    


 


INR  1.1   11/06/17  07:36    


 


APTT  34 SECONDS (21-34)   11/06/17  07:36    














- Constitutional


Appears: Cachectic, Chronically Ill





- Head Exam


Head Exam: ATRAUMATIC





- Eye Exam


Eye Exam: Normal appearance





- GI/Abdominal Exam


GI & Abdominal Exam: Normal Bowel Sounds





- Extremities Exam


Extremities Exam: Normal Inspection





- Back Exam


Back Exam: NORMAL INSPECTION





- Psychiatric Exam


Psychiatric exam: Normal Affect, Normal Mood





Assessment and Plan





- Assessment and Plan (Free Text)


Assessment: 





1. Sinus tach is a little better. Coreg ordered if bp is greater than 90 

systolic.


2. Pt is very weak and debilitated, refusing to eat. I will sign off and please 

call if any questions arise.

## 2017-11-07 NOTE — CP.PCM.PN
Subjective





- Date & Time of Evaluation


Date of Evaluation: 11/07/17


Time of Evaluation: 17:45





- Subjective


Subjective: 





Pt continues to refuse to eat; family in at lunch time and expected to return 

for dinner to help feed pt. Reviewed events of the day and noted pts 

intentional removal of NG Tube. Scheduled conference with family iin AM to 

determine in which direction to go with pt: hospice vs PEG placement. 





Objective





- Vital Signs/Intake and Output


Vital Signs (last 24 hours): 


 











Temp Pulse Resp BP Pulse Ox


 


 98 F   94 H  20   98/57 L  98 


 


 11/07/17 15:40  11/07/17 23:25  11/07/17 15:40  11/07/17 15:40  11/07/17 15:40








Intake and Output: 


 











 11/07/17 11/08/17





 18:59 06:59


 


Intake Total 1393 


 


Output Total 900 700


 


Balance 493 -700














- Medications


Medications: 


 Current Medications





Acetaminophen (Tylenol 325mg Tab)  325 mg PO Q4 PRN


   PRN Reason: Fever >100.4 F


Albuterol Sulfate (Albuterol 0.083% Inhal Sol (2.5 Mg/3 Ml) Ud)  2.5 mg IH RQ6 

[facility]


   Last Admin: 11/07/17 13:35 Dose:  Not Given


Aspirin (Ecotrin)  81 mg PO DAILY [facility]


   Last Admin: 11/07/17 10:45 Dose:  81 mg


Calcium/Vitamin D (Oyster Shell Calcium/Vitamin D 500 Mg-200 Iu)  1 tab PO TID 

[facility]


   Last Admin: 11/07/17 17:48 Dose:  1 tab


Carvedilol (Coreg)  6.25 mg PO BID [facility]


   Last Admin: 11/07/17 17:52 Dose:  6.25 mg


Dronabinol (Marinol)  2.5 mg PO BID [facility]


   Last Admin: 11/07/17 17:48 Dose:  2.5 mg


Enoxaparin Sodium (Lovenox)  50 mg SC Q12H [facility]


   Last Admin: 11/07/17 21:11 Dose:  50 mg


Hydromorphone HCl (Dilaudid)  0.5 mg IVP Q4H PRN


   PRN Reason: Pain, Mild (1-3)


   Last Admin: 11/07/17 08:39 Dose:  0.5 mg


Meropenem 500 mg/ Dextrose  100 mls @ 100 mls/hr IVPB Q8H [facility]


   Last Admin: 11/07/17 17:50 Dose:  100 mls/hr


Potassium Phosphate 8.25 mmole (/ Sodium Chloride)  1,002.75 mls @ 30 mls/hr IV 

.Q24H [facility]


   Last Admin: 11/07/17 20:24 Dose:  Not Given


Insulin Human Regular (Novolin R)  0 unit SC ACHS [facility]


   PRN Reason: Protocol


   Last Admin: 11/07/17 21:12 Dose:  Not Given


Lactulose (Enulose)  20 gm PO Q12H [facility]


   Last Admin: 11/07/17 17:47 Dose:  20 gm


Mirtazapine (Remeron)  7.5 mg PO HS [facility]


   Last Admin: 11/07/17 21:12 Dose:  Not Given


Ondansetron HCl (Zofran Inj)  4 mg IVP Q4 PRN


   PRN Reason: Nausea/Vomiting


Pantoprazole Sodium (Protonix Inj)  40 mg IVP DAILY [facility]


   Last Admin: 11/07/17 10:45 Dose:  40 mg


Potassium Chloride (Potassium Chloride Oral Soln)  40 meq PO Q12 [facility]


   Last Admin: 11/07/17 21:12 Dose:  Not Given


Rosuvastatin Calcium (Crestor)  5 mg PO HS [facility]


   Last Admin: 11/07/17 21:11 Dose:  Not Given











- Labs


Labs: 


 





 11/07/17 07:28 





 11/07/17 07:28 





 











PT  12.5 SECONDS (9.7-12.2)  H  11/06/17  07:36    


 


INR  1.1   11/06/17  07:36    


 


APTT  34 SECONDS (21-34)   11/06/17  07:36    














- Constitutional


Appears: No Acute Distress





- Head Exam


Head Exam: NORMAL INSPECTION, NORMOCEPHALIC


Additional comments: 





+ NG tube early, subsequently removed and reinserted again. 





- Eye Exam


Eye Exam: Normal appearance


Pupil Exam: NORMAL ACCOMODATION





- ENT Exam


ENT Exam: Mucous Membranes Moist, Normal Exam





- Neck Exam


Neck Exam: Full ROM, Normal Inspection





- Respiratory Exam


Respiratory Exam: Clear to Ausculation Bilateral, NORMAL BREATHING PATTERN





- Cardiovascular Exam


Cardiovascular Exam: REGULAR RHYTHM





- GI/Abdominal Exam


GI & Abdominal Exam: Soft, Normal Bowel Sounds





- Rectal Exam


Rectal Exam: Deferred





- Extremities Exam


Extremities Exam: Full ROM, Normal Capillary Refill, Normal Inspection





- Back Exam


Back Exam: NORMAL INSPECTION





- Neurological Exam


Neurological Exam: Alert, Normal Gait





- Psychiatric Exam


Psychiatric exam: Depressed, Flat Affect, Normal Mood





- Skin


Skin Exam: Normal Color, Warm





Assessment and Plan


(1) Diabetes mellitus


Assessment & Plan: 


stable


Status: Chronic   





(2) Malnutrition following gastrointestinal surgery


Assessment & Plan: 


to discuss direction of pt's care: end of life care vs aggressive nutritional 

support


Status: Chronic   





(3) Bacteremia


Assessment & Plan: 


afebrile, no s/sx of infection


Status: Acute

## 2017-11-08 LAB
BASOPHILS # BLD AUTO: 0.1 K/UL (ref 0–0.2)
BASOPHILS NFR BLD: 1 % (ref 0–2)
BUN SERPL-MCNC: 13 MG/DL (ref 7–17)
CALCIUM SERPL-MCNC: 7.4 MG/DL (ref 8.6–10.4)
CHLORIDE SERPL-SCNC: 101 MMOL/L (ref 98–107)
CO2 SERPL-SCNC: 27 MMOL/L (ref 22–30)
EOSINOPHIL # BLD AUTO: 0.2 K/UL (ref 0–0.7)
EOSINOPHIL NFR BLD: 4 % (ref 0–4)
ERYTHROCYTE [DISTWIDTH] IN BLOOD BY AUTOMATED COUNT: 13.8 % (ref 11.5–14.5)
GLUCOSE SERPL-MCNC: 117 MG/DL (ref 65–105)
HCT VFR BLD CALC: 24.2 % (ref 34–47)
LYMPHOCYTES # BLD AUTO: 0.7 K/UL (ref 1–4.3)
LYMPHOCYTES NFR BLD AUTO: 13.3 % (ref 20–40)
MCH RBC QN AUTO: 29.9 PG (ref 27–31)
MCHC RBC AUTO-ENTMCNC: 33.2 G/DL (ref 33–37)
MCV RBC AUTO: 90.2 FL (ref 81–99)
MONOCYTES # BLD: 0.4 K/UL (ref 0–0.8)
MONOCYTES NFR BLD: 7.5 % (ref 0–10)
NRBC BLD AUTO-RTO: 0 % (ref 0–2)
PLATELET # BLD: 175 K/UL (ref 130–400)
PMV BLD AUTO: 9.5 FL (ref 7.2–11.7)
POTASSIUM SERPL-SCNC: 4.5 MMOL/L (ref 3.6–5.2)
SODIUM SERPL-SCNC: 131 MMOL/L (ref 132–148)
WBC # BLD AUTO: 5.5 K/UL (ref 4.8–10.8)

## 2017-11-08 RX ADMIN — HUMAN INSULIN SCH: 100 INJECTION, SOLUTION SUBCUTANEOUS at 07:24

## 2017-11-08 RX ADMIN — HUMAN INSULIN SCH UNIT: 100 INJECTION, SOLUTION SUBCUTANEOUS at 18:30

## 2017-11-08 RX ADMIN — ALBUTEROL SULFATE SCH MG: 2.5 SOLUTION RESPIRATORY (INHALATION) at 13:05

## 2017-11-08 RX ADMIN — SODIUM CHLORIDE SCH MLS/HR: 0.9 INJECTION, SOLUTION INTRAVENOUS at 00:27

## 2017-11-08 RX ADMIN — CALCIUM CARBONATE-VITAMIN D TAB 500 MG-200 UNIT SCH TAB: 500-200 TAB at 09:50

## 2017-11-08 RX ADMIN — HYDROMORPHONE HYDROCHLORIDE PRN MG: 1 INJECTION, SOLUTION INTRAMUSCULAR; INTRAVENOUS; SUBCUTANEOUS at 10:32

## 2017-11-08 RX ADMIN — ALBUTEROL SULFATE SCH: 2.5 SOLUTION RESPIRATORY (INHALATION) at 19:00

## 2017-11-08 RX ADMIN — CALCIUM CARBONATE-VITAMIN D TAB 500 MG-200 UNIT SCH TAB: 500-200 TAB at 18:31

## 2017-11-08 RX ADMIN — SODIUM CHLORIDE SCH MLS/HR: 0.9 INJECTION, SOLUTION INTRAVENOUS at 16:07

## 2017-11-08 RX ADMIN — POTASSIUM CHLORIDE SCH MEQ: 1.5 SOLUTION ORAL at 09:50

## 2017-11-08 RX ADMIN — SODIUM CHLORIDE SCH MLS/HR: 0.9 INJECTION, SOLUTION INTRAVENOUS at 05:59

## 2017-11-08 RX ADMIN — SODIUM CHLORIDE SCH MLS/HR: 0.9 INJECTION, SOLUTION INTRAVENOUS at 07:54

## 2017-11-08 RX ADMIN — CALCIUM CARBONATE-VITAMIN D TAB 500 MG-200 UNIT SCH TAB: 500-200 TAB at 13:57

## 2017-11-08 RX ADMIN — ALBUTEROL SULFATE SCH MG: 2.5 SOLUTION RESPIRATORY (INHALATION) at 06:55

## 2017-11-08 RX ADMIN — POTASSIUM CHLORIDE SCH: 1.5 SOLUTION ORAL at 21:38

## 2017-11-08 RX ADMIN — HUMAN INSULIN SCH: 100 INJECTION, SOLUTION SUBCUTANEOUS at 21:36

## 2017-11-08 RX ADMIN — ENOXAPARIN SODIUM SCH MG: 60 INJECTION SUBCUTANEOUS at 21:03

## 2017-11-08 RX ADMIN — ALBUTEROL SULFATE SCH: 2.5 SOLUTION RESPIRATORY (INHALATION) at 01:50

## 2017-11-08 RX ADMIN — HUMAN INSULIN SCH UNIT: 100 INJECTION, SOLUTION SUBCUTANEOUS at 12:04

## 2017-11-08 RX ADMIN — ENOXAPARIN SODIUM SCH MG: 60 INJECTION SUBCUTANEOUS at 09:50

## 2017-11-08 NOTE — CP.PCM.PN
Subjective





- Date & Time of Evaluation


Date of Evaluation: 11/08/17


Time of Evaluation: 10:00





- Subjective


Subjective: 


SURGERY PROGRESS NOTE FOR DR. GARNER





Patient seen and examined at bedside. Last night, the patient pulled out her 

own NG tube. The tube was reinserted. Currently receiving tube feeds via NG 

tube. Family came to feed patient [place] food.





Objective





- Vital Signs/Intake and Output


Vital Signs (last 24 hours): 


 











Temp Pulse Resp BP Pulse Ox


 


 97.5 F L  105 H  18   96/60 L  100 


 


 11/08/17 07:00  11/08/17 10:04  11/08/17 07:00  11/08/17 10:04  11/08/17 07:00








Intake and Output: 


 











 11/08/17 11/08/17





 06:59 18:59


 


Output Total 1400 500


 


Balance -1400 -500














- Medications


Medications: 


 Current Medications





Acetaminophen (Tylenol 325mg Tab)  325 mg PO Q4 PRN


   PRN Reason: Fever >100.4 F


Albuterol Sulfate (Albuterol 0.083% Inhal Sol (2.5 Mg/3 Ml) Ud)  2.5 mg IH RQ6 

[facility]


   Last Admin: 11/08/17 13:05 Dose:  2.5 mg


Aspirin (Ecotrin)  81 mg PO DAILY [facility]


   Last Admin: 11/08/17 09:50 Dose:  81 mg


Calcium/Vitamin D (Oyster Shell Calcium/Vitamin D 500 Mg-200 Iu)  1 tab PO TID 

[facility]


   Last Admin: 11/08/17 13:57 Dose:  1 tab


Carvedilol (Coreg)  6.25 mg PO BID [facility]


   Last Admin: 11/08/17 09:50 Dose:  6.25 mg


Dronabinol (Marinol)  2.5 mg PO BID [facility]


   Last Admin: 11/08/17 09:55 Dose:  2.5 mg


Enoxaparin Sodium (Lovenox)  50 mg SC Q12H [facility]


   Last Admin: 11/08/17 09:50 Dose:  50 mg


Hydromorphone HCl (Dilaudid)  0.5 mg IVP Q4H PRN


   PRN Reason: Pain, Mild (1-3)


   Last Admin: 11/08/17 10:32 Dose:  0.5 mg


Meropenem 500 mg/ Dextrose  100 mls @ 100 mls/hr IVPB Q8H [facility]


   Last Admin: 11/08/17 16:07 Dose:  100 mls/hr


Potassium Phosphate 8.25 mmole (/ Sodium Chloride)  1,002.75 mls @ 30 mls/hr IV 

.Q24H [facility]


   Last Admin: 11/08/17 05:59 Dose:  30 mls/hr


Insulin Human Regular (Novolin R)  0 unit SC ACHS [facility]


   PRN Reason: Protocol


   Last Admin: 11/08/17 12:04 Dose:  2 unit


Lactulose (Enulose)  20 gm PO Q12H [facility]


   Last Admin: 11/08/17 05:59 Dose:  20 gm


Mirtazapine (Remeron)  7.5 mg PO HS [facility]


   Last Admin: 11/07/17 21:12 Dose:  Not Given


Ondansetron HCl (Zofran Inj)  4 mg IVP Q4 PRN


   PRN Reason: Nausea/Vomiting


Pantoprazole Sodium (Protonix Inj)  40 mg IVP DAILY [facility]


   Last Admin: 11/08/17 09:50 Dose:  40 mg


Potassium Chloride (Potassium Chloride Oral Soln)  40 meq PO Q12 [facility]


   Last Admin: 11/08/17 09:50 Dose:  40 meq


Rosuvastatin Calcium (Crestor)  5 mg PO HS [facility]


   Last Admin: 11/07/17 21:11 Dose:  Not Given











- Labs


Labs: 


 





 11/08/17 07:25 





 11/08/17 07:25 





 











PT  12.5 SECONDS (9.7-12.2)  H  11/06/17  07:36    


 


INR  1.1   11/06/17  07:36    


 


APTT  34 SECONDS (21-34)   11/06/17  07:36    














- Constitutional


Appears: Non-toxic, No Acute Distress





- Respiratory Exam


Respiratory Exam: NORMAL BREATHING PATTERN.  absent: Respiratory Distress





- Cardiovascular Exam


Cardiovascular Exam: Tachycardia, +S1, +S2





- GI/Abdominal Exam


GI & Abdominal Exam: Firm (firm area in inferior portion of midline incision), 

Soft.  absent: Distended, Guarding, Rigid, Tenderness, Rebound


Additional comments: 


No erythema, tenderness, or warmth noted


Staples in place





- Extremities Exam


Extremities Exam: Calf Tenderness





- Neurological Exam


Neurological Exam: Alert, Awake





- Skin


Skin Exam: Dry, Warm





Assessment and Plan





- Assessment and Plan (Free Text)


Assessment: 


75 y/o F w/ uterine infection, L adenexal mass POD#13 s/p KASSY BSO





- Afebrile, continues to be mildly tachy


- Anemia


- Will keep staples in place for 14 days post op


- Cont abx per ID


- Encouraged OOB to chair/IS use


- GI/DVT PPx


- Tentative PEG pending family decision 





Plan discussed w/ Dr. Radha Eaton PGY-3

## 2017-11-08 NOTE — CP.PCM.PN
Subjective





- Date & Time of Evaluation


Date of Evaluation: 11/08/17


Time of Evaluation: 11:13





- Subjective


Subjective: 





COVERING DR BATEMAN 





Still getting NGT feedings





Objective





- Vital Signs/Intake and Output


Vital Signs (last 24 hours): 


 











Temp Pulse Resp BP Pulse Ox


 


 97.5 F L  105 H  18   96/60 L  100 


 


 11/08/17 07:00  11/08/17 10:04  11/08/17 07:00  11/08/17 10:04  11/08/17 07:00








Intake and Output: 


 











 11/08/17 11/08/17





 06:59 18:59


 


Output Total 1400 


 


Balance -1400 














- Medications


Medications: 


 Current Medications





Acetaminophen (Tylenol 325mg Tab)  325 mg PO Q4 PRN


   PRN Reason: Fever >100.4 F


Albuterol Sulfate (Albuterol 0.083% Inhal Sol (2.5 Mg/3 Ml) Ud)  2.5 mg IH RQ6 

[facility]


   Last Admin: 11/08/17 06:55 Dose:  2.5 mg


Aspirin (Ecotrin)  81 mg PO DAILY [facility]


   Last Admin: 11/08/17 09:50 Dose:  81 mg


Calcium/Vitamin D (Oyster Shell Calcium/Vitamin D 500 Mg-200 Iu)  1 tab PO TID 

[facility]


   Last Admin: 11/08/17 09:50 Dose:  1 tab


Carvedilol (Coreg)  6.25 mg PO BID [facility]


   Last Admin: 11/08/17 09:50 Dose:  6.25 mg


Dronabinol (Marinol)  2.5 mg PO BID [facility]


   Last Admin: 11/08/17 09:55 Dose:  2.5 mg


Enoxaparin Sodium (Lovenox)  50 mg SC Q12H [facility]


   Last Admin: 11/08/17 09:50 Dose:  50 mg


Hydromorphone HCl (Dilaudid)  0.5 mg IVP Q4H [facility]


   PRN Reason: Pain, Mild (1-3)


   Last Admin: 11/08/17 10:32 Dose:  0.5 mg


Meropenem 500 mg/ Dextrose  100 mls @ 100 mls/hr IVPB Q8H [facility]


   Last Admin: 11/08/17 07:54 Dose:  100 mls/hr


Potassium Phosphate 8.25 mmole (/ Sodium Chloride)  1,002.75 mls @ 30 mls/hr IV 

.Q24H [facility]


   Last Admin: 11/08/17 05:59 Dose:  30 mls/hr


Insulin Human Regular (Novolin R)  0 unit SC ACHS [facility]


   PRN Reason: Protocol


   Last Admin: 11/08/17 07:24 Dose:  Not Given


Lactulose (Enulose)  20 gm PO Q12H [facility]


   Last Admin: 11/08/17 05:59 Dose:  20 gm


Mirtazapine (Remeron)  7.5 mg PO HS [facility]


   Last Admin: 11/07/17 21:12 Dose:  Not Given


Ondansetron HCl (Zofran Inj)  4 mg IVP Q4 PRN


   PRN Reason: Nausea/Vomiting


Pantoprazole Sodium (Protonix Inj)  40 mg IVP DAILY [facility]


   Last Admin: 11/08/17 09:50 Dose:  40 mg


Potassium Chloride (Potassium Chloride Oral Soln)  40 meq PO Q12 [facility]


   Last Admin: 11/08/17 09:50 Dose:  40 meq


Rosuvastatin Calcium (Crestor)  5 mg PO HS [facility]


   Last Admin: 11/07/17 21:11 Dose:  Not Given











- Labs


Labs: 


 





 11/08/17 07:25 





 11/08/17 07:25 





 











PT  12.5 SECONDS (9.7-12.2)  H  11/06/17  07:36    


 


INR  1.1   11/06/17  07:36    


 


APTT  34 SECONDS (21-34)   11/06/17  07:36    














- Constitutional


Appears: No Acute Distress





- Head Exam


Head Exam: ATRAUMATIC, NORMOCEPHALIC





- Respiratory Exam


Respiratory Exam: NORMAL BREATHING PATTERN





- Cardiovascular Exam


Cardiovascular Exam: REGULAR RHYTHM





- GI/Abdominal Exam


GI & Abdominal Exam: Soft, Normal Bowel Sounds.  absent: Tenderness


Additional comments: 





incision healing well





Assessment and Plan


(1) Malnutrition following gastrointestinal surgery


Assessment & Plan: 


Awaiting decision regarding feeding tube insertion and best method to insert (

endo vs surgical) in view of recent laparotomy and incision. 





Cont NGT feedings. 


Dr Bateman to resume care tomorrow. 


Status: Chronic   





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


Status: Chronic   





(3) Dysphagia as late effect of cerebrovascular accident (CVA)


Status: Chronic

## 2017-11-08 NOTE — CON
ADDENDUM



Review of patient's labs, we will try to do B12 level as well as folate as

the patient is complaining of poor appetite and depression; however, on

review of his medications, the patient is taking Reglan as well as Zofran. 

We will discontinue the Reglan for now as Reglan causes mood changes and

continue the Zofran.  Patient has been given Remeron 7.5 mg at bedtime for

depression as well as poor appetite, and the patient is also taking Marinol

2.5 mg b.i.d. to improve her appetite.  We will monitor her p.o. intake and

also monitor for signs or symptoms of depression.





_________________________________

Shubham Lock MD



DD:  11/07/2017 20:07:17

DT:  11/07/2017 21:16:28

Job # [id number]

## 2017-11-08 NOTE — RAD
HISTORY:

ngt placement  



COMPARISON:

Portable chest 11/03/2017. 



FINDINGS:

A nasogastric tube is identified replacing the prior NG tube 

terminating at the region the gastric viscus. The side hole appears a 

few cm distal to the esophagogastric junction region and consider 

advancing the NG tube further into the stomach. Right PICC is 

unchanged in position. 



LUNGS:

There is a borderline patchy density the medial right base versus 

crowding of the bronchovascular markings.  Clinically correlate 

further. Minimal residual left basilar airspace disease remains.



PLEURA:

No significant pleural effusion identified, no pneumothorax apparent.



CARDIOVASCULAR:

Normal.



OSSEOUS STRUCTURES:

No significant abnormalities.



VISUALIZED UPPER ABDOMEN:

Normal.



OTHER FINDINGS:

None.



IMPRESSION:

An interval nasogastric tube replaces the prior NG tube training in 

essentially the same position in the gastric viscus. Consider 

advancing the catheter further into the stomach as the side hole is 

only a few cm distal to the esophagogastric junction. Diminishing 

left basilar airspace disease. Carotid bronchovascular markings 

versus limited atelectasis or infiltrate medial right base.

## 2017-11-08 NOTE — CP.PCM.PN
Subjective





- Date & Time of Evaluation


Date of Evaluation: 11/08/17


Time of Evaluation: 10:00





- Subjective


Subjective: 





afebrile


ngt feedings in progress





Objective





- Vital Signs/Intake and Output


Vital Signs (last 24 hours): 


 











Temp Pulse Resp BP Pulse Ox


 


 97.5 F L  105 H  18   96/60 L  100 


 


 11/08/17 07:00  11/08/17 10:04  11/08/17 07:00  11/08/17 10:04  11/08/17 07:00








Intake and Output: 


 











 11/08/17 11/08/17





 06:59 18:59


 


Output Total 1400 500


 


Balance -1400 -500














- Medications


Medications: 


 Current Medications





Acetaminophen (Tylenol 325mg Tab)  325 mg PO Q4 PRN


   PRN Reason: Fever >100.4 F


Albuterol Sulfate (Albuterol 0.083% Inhal Sol (2.5 Mg/3 Ml) Ud)  2.5 mg IH RQ6 

[facility]


   Last Admin: 11/08/17 13:05 Dose:  2.5 mg


Aspirin (Ecotrin)  81 mg PO DAILY [facility]


   Last Admin: 11/08/17 09:50 Dose:  81 mg


Calcium/Vitamin D (Oyster Shell Calcium/Vitamin D 500 Mg-200 Iu)  1 tab PO TID 

[facility]


   Last Admin: 11/08/17 13:57 Dose:  1 tab


Carvedilol (Coreg)  6.25 mg PO BID [facility]


   Last Admin: 11/08/17 09:50 Dose:  6.25 mg


Dronabinol (Marinol)  2.5 mg PO BID [facility]


   Last Admin: 11/08/17 09:55 Dose:  2.5 mg


Enoxaparin Sodium (Lovenox)  50 mg SC Q12H [facility]


   Last Admin: 11/08/17 09:50 Dose:  50 mg


Hydromorphone HCl (Dilaudid)  0.5 mg IVP Q4H [facility]


   PRN Reason: Pain, Mild (1-3)


   Last Admin: 11/08/17 10:32 Dose:  0.5 mg


Meropenem 500 mg/ Dextrose  100 mls @ 100 mls/hr IVPB Q8H [facility]


   Last Admin: 11/08/17 16:07 Dose:  100 mls/hr


Potassium Phosphate 8.25 mmole (/ Sodium Chloride)  1,002.75 mls @ 30 mls/hr IV 

.Q24H [facility]


   Last Admin: 11/08/17 05:59 Dose:  30 mls/hr


Insulin Human Regular (Novolin R)  0 unit SC ACHS [facility]


   PRN Reason: Protocol


   Last Admin: 11/08/17 12:04 Dose:  2 unit


Lactulose (Enulose)  20 gm PO Q12H [facility]


   Last Admin: 11/08/17 05:59 Dose:  20 gm


Mirtazapine (Remeron)  7.5 mg PO HS [facility]


   Last Admin: 11/07/17 21:12 Dose:  Not Given


Ondansetron HCl (Zofran Inj)  4 mg IVP Q4 PRN


   PRN Reason: Nausea/Vomiting


Pantoprazole Sodium (Protonix Inj)  40 mg IVP DAILY [facility]


   Last Admin: 11/08/17 09:50 Dose:  40 mg


Potassium Chloride (Potassium Chloride Oral Soln)  40 meq PO Q12 [facility]


   Last Admin: 11/08/17 09:50 Dose:  40 meq


Rosuvastatin Calcium (Crestor)  5 mg PO HS [facility]


   Last Admin: 11/07/17 21:11 Dose:  Not Given











- Labs


Labs: 


 





 11/08/17 07:25 





 11/08/17 07:25 





 











PT  12.5 SECONDS (9.7-12.2)  H  11/06/17  07:36    


 


INR  1.1   11/06/17  07:36    


 


APTT  34 SECONDS (21-34)   11/06/17  07:36    














- Constitutional


Appears: Non-toxic, Cachectic, Chronically Ill





- Head Exam


Head Exam: NORMOCEPHALIC





- Eye Exam


Eye Exam: absent: Scleral icterus





- ENT Exam


ENT Exam: Mucous Membranes Dry





- Neck Exam


Neck Exam: absent: Lymphadenopathy





- Respiratory Exam


Respiratory Exam: Decreased Breath Sounds, Clear to Ausculation Bilateral





- Cardiovascular Exam


Cardiovascular Exam: REGULAR RHYTHM, +S1, +S2





- GI/Abdominal Exam


GI & Abdominal Exam: Distended





Assessment and Plan


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


Status: Chronic   





(2) Diabetes mellitus


Status: Chronic   





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


Status: Deleted   





- Assessment and Plan (Free Text)


Assessment: 





cont iv rx and wound care

## 2017-11-09 LAB
ALBUMIN/GLOB SERPL: 0.8 {RATIO} (ref 1–2.1)
ALP SERPL-CCNC: 68 U/L (ref 38–126)
ALT SERPL-CCNC: 52 U/L (ref 9–52)
AST SERPL-CCNC: 26 U/L (ref 14–36)
BASOPHILS # BLD AUTO: 0.1 K/UL (ref 0–0.2)
BASOPHILS NFR BLD: 1.2 % (ref 0–2)
BILIRUB SERPL-MCNC: 0.5 MG/DL (ref 0.2–1.3)
BUN SERPL-MCNC: 12 MG/DL (ref 7–17)
CALCIUM SERPL-MCNC: 7.7 MG/DL (ref 8.6–10.4)
CHLORIDE SERPL-SCNC: 102 MMOL/L (ref 98–107)
CLOSURE TME BLD-IMP: 224 K/UL
CO2 SERPL-SCNC: 25 MMOL/L (ref 22–30)
EOSINOPHIL # BLD AUTO: 0.1 K/UL (ref 0–0.7)
EOSINOPHIL NFR BLD: 2.8 % (ref 0–4)
ERYTHROCYTE [DISTWIDTH] IN BLOOD BY AUTOMATED COUNT: 13.9 % (ref 11.5–14.5)
GLOBULIN SER-MCNC: 3 GM/DL (ref 2.2–3.9)
GLUCOSE SERPL-MCNC: 104 MG/DL (ref 65–105)
HCT VFR BLD CALC: 23.7 % (ref 34–47)
INR PPP: 1
LYMPHOCYTES # BLD AUTO: 1 K/UL (ref 1–4.3)
LYMPHOCYTES NFR BLD AUTO: 19.2 % (ref 20–40)
MAGNESIUM SERPL-MCNC: 1.8 MG/DL (ref 1.6–2.3)
MCH RBC QN AUTO: 29.9 PG (ref 27–31)
MCHC RBC AUTO-ENTMCNC: 33.3 G/DL (ref 33–37)
MCV RBC AUTO: 89.8 FL (ref 81–99)
MONOCYTES # BLD: 0.6 K/UL (ref 0–0.8)
MONOCYTES NFR BLD: 12.1 % (ref 0–10)
NRBC BLD AUTO-RTO: 0 % (ref 0–2)
PA ADP PRP QL: 12 K/UL
PHOSPHATE SERPL-MCNC: 2.9 MG/DL (ref 2.5–4.5)
PLATELET # BLD: 236 K/UL
PLATELET # BLD: 252 K/UL (ref 130–400)
PMV BLD AUTO: 8.2 FL (ref 7.2–11.7)
POTASSIUM SERPL-SCNC: 5.7 MMOL/L (ref 3.6–5.2)
PROT SERPL-MCNC: 5.6 G/DL (ref 6.3–8.3)
SODIUM SERPL-SCNC: 131 MMOL/L (ref 132–148)
WBC # BLD AUTO: 5.3 K/UL (ref 4.8–10.8)

## 2017-11-09 RX ADMIN — POTASSIUM CHLORIDE SCH MEQ: 1.5 SOLUTION ORAL at 10:48

## 2017-11-09 RX ADMIN — SODIUM CHLORIDE SCH MLS/HR: 0.9 INJECTION, SOLUTION INTRAVENOUS at 08:43

## 2017-11-09 RX ADMIN — HUMAN INSULIN SCH: 100 INJECTION, SOLUTION SUBCUTANEOUS at 21:38

## 2017-11-09 RX ADMIN — ALBUTEROL SULFATE SCH MG: 2.5 SOLUTION RESPIRATORY (INHALATION) at 13:42

## 2017-11-09 RX ADMIN — CALCIUM CARBONATE-VITAMIN D TAB 500 MG-200 UNIT SCH TAB: 500-200 TAB at 10:52

## 2017-11-09 RX ADMIN — ALBUTEROL SULFATE SCH: 2.5 SOLUTION RESPIRATORY (INHALATION) at 01:27

## 2017-11-09 RX ADMIN — HUMAN INSULIN SCH: 100 INJECTION, SOLUTION SUBCUTANEOUS at 07:54

## 2017-11-09 RX ADMIN — HUMAN INSULIN SCH: 100 INJECTION, SOLUTION SUBCUTANEOUS at 16:35

## 2017-11-09 RX ADMIN — ALBUTEROL SULFATE SCH MG: 2.5 SOLUTION RESPIRATORY (INHALATION) at 07:27

## 2017-11-09 RX ADMIN — HYDROMORPHONE HYDROCHLORIDE PRN MG: 1 INJECTION, SOLUTION INTRAMUSCULAR; INTRAVENOUS; SUBCUTANEOUS at 22:41

## 2017-11-09 RX ADMIN — POTASSIUM CHLORIDE SCH: 1.5 SOLUTION ORAL at 21:39

## 2017-11-09 RX ADMIN — SODIUM CHLORIDE SCH: 0.9 INJECTION, SOLUTION INTRAVENOUS at 17:39

## 2017-11-09 RX ADMIN — CALCIUM CARBONATE-VITAMIN D TAB 500 MG-200 UNIT SCH TAB: 500-200 TAB at 17:58

## 2017-11-09 RX ADMIN — HUMAN INSULIN SCH: 100 INJECTION, SOLUTION SUBCUTANEOUS at 11:59

## 2017-11-09 RX ADMIN — SODIUM CHLORIDE SCH MLS/HR: 0.9 INJECTION, SOLUTION INTRAVENOUS at 00:47

## 2017-11-09 RX ADMIN — CALCIUM CARBONATE-VITAMIN D TAB 500 MG-200 UNIT SCH: 500-200 TAB at 09:43

## 2017-11-09 RX ADMIN — POTASSIUM CHLORIDE SCH: 1.5 SOLUTION ORAL at 09:43

## 2017-11-09 RX ADMIN — CALCIUM CARBONATE-VITAMIN D TAB 500 MG-200 UNIT SCH: 500-200 TAB at 14:29

## 2017-11-09 RX ADMIN — SODIUM CHLORIDE SCH MLS/HR: 0.9 INJECTION, SOLUTION INTRAVENOUS at 16:31

## 2017-11-09 RX ADMIN — ALBUTEROL SULFATE SCH MG: 2.5 SOLUTION RESPIRATORY (INHALATION) at 21:37

## 2017-11-09 NOTE — CP.PCM.PN
Subjective





- Date & Time of Evaluation


Date of Evaluation: 11/09/17


Time of Evaluation: 10:11





- Subjective


Subjective: 





Surgery: Dr. Garcia





Pt seen and examined. Resting comfortably in bed. Per nursing no acute events 

overnight. 





Objective





- Vital Signs/Intake and Output


Vital Signs (last 24 hours): 


 











Temp Pulse Resp BP Pulse Ox


 


 98.7 F   107 H  18   124/64   98 


 


 11/09/17 08:12  11/09/17 08:12  11/09/17 08:12  11/09/17 08:12  11/09/17 08:12








Intake and Output: 


 











 11/09/17 11/09/17





 06:59 18:59


 


Intake Total 480 


 


Output Total 2001 


 


Balance -1521 














- Medications


Medications: 


 Current Medications





Acetaminophen (Tylenol 325mg Tab)  325 mg PO Q4 PRN


   PRN Reason: Fever >100.4 F


Albuterol Sulfate (Albuterol 0.083% Inhal Sol (2.5 Mg/3 Ml) Ud)  2.5 mg IH RQ6 

[facility]


   Last Admin: 11/09/17 07:27 Dose:  2.5 mg


Aspirin (Ecotrin)  81 mg PO DAILY [facility]


   Last Admin: 11/09/17 09:42 Dose:  Not Given


Calcium/Vitamin D (Oyster Shell Calcium/Vitamin D 500 Mg-200 Iu)  1 tab PO TID 

[facility]


   Last Admin: 11/09/17 09:43 Dose:  Not Given


Carvedilol (Coreg)  6.25 mg PO BID [facility]


   Last Admin: 11/08/17 18:30 Dose:  6.25 mg


Dronabinol (Marinol)  2.5 mg PO BID [facility]


   Last Admin: 11/09/17 09:43 Dose:  Not Given


Enoxaparin Sodium (Lovenox)  50 mg SC Q12H [facility]


   Last Admin: 11/08/17 21:03 Dose:  50 mg


Hydromorphone HCl (Dilaudid)  0.5 mg IVP Q4H PRN


   PRN Reason: Pain, Mild (1-3)


   Last Admin: 11/08/17 10:32 Dose:  0.5 mg


Meropenem 500 mg/ Dextrose  100 mls @ 100 mls/hr IVPB Q8H [facility]


   Last Admin: 11/09/17 08:43 Dose:  100 mls/hr


Potassium Phosphate 8.25 mmole (/ Sodium Chloride)  1,002.75 mls @ 30 mls/hr IV 

.Q24H [facility]


   Last Admin: 11/08/17 05:59 Dose:  30 mls/hr


Insulin Human Regular (Novolin R)  0 unit SC ACHS [facility]


   PRN Reason: Protocol


   Last Admin: 11/09/17 07:54 Dose:  Not Given


Lactulose (Enulose)  20 gm PO Q12H [facility]


   Last Admin: 11/09/17 05:37 Dose:  Not Given


Mirtazapine (Remeron)  7.5 mg PO HS [facility]


   Last Admin: 11/08/17 21:37 Dose:  7.5 mg


Ondansetron HCl (Zofran Inj)  4 mg IVP Q4 PRN


   PRN Reason: Nausea/Vomiting


Pantoprazole Sodium (Protonix Inj)  40 mg IVP DAILY [facility]


   Last Admin: 11/09/17 09:37 Dose:  40 mg


Potassium Chloride (Potassium Chloride Oral Soln)  40 meq PO Q12 [facility]


   Last Admin: 11/09/17 09:43 Dose:  Not Given


Rosuvastatin Calcium (Crestor)  5 mg PO [facility]


   Last Admin: 11/08/17 21:37 Dose:  5 mg











- Labs


Labs: 


 





 11/08/17 07:25 





 11/08/17 07:25 





 











PT  12.5 SECONDS (9.7-12.2)  H  11/06/17  07:36    


 


INR  1.1   11/06/17  07:36    


 


APTT  34 SECONDS (21-34)   11/06/17  07:36    














- Constitutional


Appears: Non-toxic, No Acute Distress





- Head Exam


Head Exam: ATRAUMATIC, NORMOCEPHALIC





- Eye Exam


Eye Exam: EOMI





- ENT Exam


ENT Exam: Mucous Membranes Moist





- Neck Exam


Neck Exam: Full ROM





- Respiratory Exam


Respiratory Exam: NORMAL BREATHING PATTERN.  absent: Accessory Muscle Use, 

Respiratory Distress





- GI/Abdominal Exam


GI & Abdominal Exam: Soft.  absent: Distended, Firm, Guarding, Rigid, Tenderness


Additional comments: 





midline incision, C/D/I w. staples, hematoma noted at inferior portion of 

incision





- Extremities Exam


Extremities Exam: absent: Calf Tenderness, Pedal Edema





- Neurological Exam


Neurological Exam: Alert, Awake.  absent: Oriented x3





Assessment and Plan





- Assessment and Plan (Free Text)


Assessment: 





76F w/ uterine infection, L adenexal mass POD#14 s/p KASSY BSO


-pt refusing to eat


-IR consulted for gastrostomy tube placement


-If IR unable to place tube, surgery will place open G tube


-will continue to follow


-d/w attending





Cassandra PGY3

## 2017-11-09 NOTE — CP.PCM.PN
Subjective





- Date & Time of Evaluation


Date of Evaluation: 11/08/17


Time of Evaluation: 11:45





- Subjective


Subjective: 


LATE ENTRY FOR 11/9/2017 PROGRESS NOTES:





Had a family conference today daughter Gary and grandson jT. Recounted to 

daughter series of events that led to this hospitalization as documented from 

the beginning of the this admission.





Explained to family that the issue facing us now is pt's refusal to eat 

anything that was not fed to her by her daughter(s) or family. Educated family 

on various ways pt can be given nutrition, including the current method of 

feeding via NG tube and its limitations, especially the time limits as necrosis 

of the adjacent tissues may ensue. Current POA, chosen by family bec he speaks 

the best English and representing the family's opinions, did not want a PEG put 

in. Pt's daughter was leaning towards this also, but when informed that this 

may be a temporary situation and may eventually be taken out as long as pt 

shows she can eat, then the PEG will only be to ensure that there's a way of 

feeding patient when she decides not to eat as in this situation. If she 

continues to eat well, we may need the PEG only for a short time. Either way, 

this is not a permannent solution to her problem. I would like the pt to show 

some initiative and have her eat even with assistance, if not on her own. 





After we finished discussing the treatment plan and the daughter giving consent 

for the pt to have PEG installed, I opened the topic of Code Status with the 2 

family members. Daughter says she doesn't know what pt's wishes are regarding 

end-of life-care, and neither did the grandson. Daughter did ask at the start 

of the meeting why we had pursued treatment aggressively and I explained to 

them that this was something that they needed to have discussed with the Social 

Workers that they had worked with in the past, as well as [facility] and where she now lives. It was fortunate 

that we had come in just in time and the timing of interventions was well 

executed and without mishap. I showed them a copy of what an Advance Directive 

for [facility] Care looks like, and which of the different options we had 

followed. Should they not want an aggressive intervention on our part (and 

therefore, only make the pt comfortable as be free from pain, then they need to 

give us instructions to that effect. Asked family to study the form I have them 

about Advance Directives and to have an ansewr for me before the next admission 

to the NH. Family verbalized understanding and promised to have a decision by 

then. 








Advance Directive Discussion: 50 mins


Treatment Discussion: 45 mins. 








Objective





- Vital Signs/Intake and Output


Vital Signs (last 24 hours): 


 











Temp Pulse Resp BP Pulse Ox


 


 98.8 F   96 H  20   103/67   96 


 


 11/08/17 23:25  11/08/17 23:25  11/08/17 23:25  11/08/17 23:25  11/08/17 23:25








Intake and Output: 


 











 11/08/17 11/09/17





 18:59 06:59


 


Output Total 500 900


 


Balance -500 -900














- Medications


Medications: 


 Current Medications





Acetaminophen (Tylenol 325mg Tab)  325 mg PO Q4 PRN


   PRN Reason: Fever >100.4 F


Albuterol Sulfate (Albuterol 0.083% Inhal Sol (2.5 Mg/3 Ml) Ud)  2.5 mg IH RQ6 

[facility]


   Last Admin: 11/09/17 01:27 Dose:  Not Given


Aspirin (Ecotrin)  81 mg PO DAILY [facility]


   Last Admin: 11/08/17 09:50 Dose:  81 mg


Calcium/Vitamin D (Oyster Shell Calcium/Vitamin D 500 Mg-200 Iu)  1 tab PO TID 

[facility]


   Last Admin: 11/08/17 18:31 Dose:  1 tab


Carvedilol (Coreg)  6.25 mg PO BID [facility]


   Last Admin: 11/08/17 18:30 Dose:  6.25 mg


Dronabinol (Marinol)  2.5 mg PO BID [facility]


   Last Admin: 11/08/17 18:30 Dose:  2.5 mg


Enoxaparin Sodium (Lovenox)  50 mg SC Q12H [facility]


   Last Admin: 11/08/17 21:03 Dose:  50 mg


Hydromorphone HCl (Dilaudid)  0.5 mg IVP Q4H PRN


   PRN Reason: Pain, Mild (1-3)


   Last Admin: 11/08/17 10:32 Dose:  0.5 mg


Meropenem 500 mg/ Dextrose  100 mls @ 100 mls/hr IVPB Q8H [facility]


   Last Admin: 11/09/17 00:47 Dose:  100 mls/hr


Potassium Phosphate 8.25 mmole (/ Sodium Chloride)  1,002.75 mls @ 30 mls/hr IV 

.Q24H [facility]


   Last Admin: 11/08/17 05:59 Dose:  30 mls/hr


Insulin Human Regular (Novolin R)  0 unit SC ACHS [facility]


   PRN Reason: Protocol


   Last Admin: 11/08/17 21:36 Dose:  Not Given


Lactulose (Enulose)  20 gm PO Q12H [facility]


   Last Admin: 11/08/17 18:31 Dose:  Not Given


Mirtazapine (Remeron)  7.5 mg PO [facility]


   Last Admin: 11/08/17 21:37 Dose:  7.5 mg


Ondansetron HCl (Zofran Inj)  4 mg IVP Q4 PRN


   PRN Reason: Nausea/Vomiting


Pantoprazole Sodium (Protonix Inj)  40 mg IVP DAILY [facility]


   Last Admin: 11/08/17 09:50 Dose:  40 mg


Potassium Chloride (Potassium Chloride Oral Soln)  40 meq PO Q12 [facility]


   Last Admin: 11/08/17 21:38 Dose:  Not Given


Rosuvastatin Calcium (Crestor)  5 mg PO [facility]


   Last Admin: 11/08/17 21:37 Dose:  5 mg











- Labs


Labs: 


 





 11/08/17 07:25 





 11/08/17 07:25 





 











PT  12.5 SECONDS (9.7-12.2)  H  11/06/17  07:36    


 


INR  1.1   11/06/17  07:36    


 


APTT  34 SECONDS (21-34)   11/06/17  07:36    














- Constitutional


Appears: No Acute Distress





- Head Exam


Head Exam: NORMAL INSPECTION





- Eye Exam


Eye Exam: Normal appearance





- ENT Exam


ENT Exam: Mucous Membranes Moist, Normal Exam





- Neck Exam


Neck Exam: Normal Inspection





- Respiratory Exam


Respiratory Exam: Clear to Ausculation Bilateral, NORMAL BREATHING PATTERN





- Cardiovascular Exam


Cardiovascular Exam: REGULAR RHYTHM





- GI/Abdominal Exam


GI & Abdominal Exam: Hyperactive Bowel Sounds





- Rectal Exam


Rectal Exam: Deferred, Bloody Stool





- Back Exam


Back Exam: NORMAL INSPECTION (+ pressure ulcer R PSIS)





- Neurological Exam


Neurological Exam: Abnormal Gait


Neuro motor strength exam: Left Upper Extremity: 4, Right Upper Extremity: 2/1, 

Left Lower Extremity: 4, Right Lower Extremity: 2/1





- Psychiatric Exam


Psychiatric exam: Depressed, Flat Affect





- Skin


Skin Exam: Dry, Warm ( stage II pressure ulcers, R buttocks)





Assessment and Plan


(1) Diabetes mellitus


Assessment & Plan: 


stable, no severe hypo or hyperglycemic episodes


Status: Chronic   





(2) Malnutrition following gastrointestinal surgery


Assessment & Plan: 


planning PEG placement


Status: Chronic   





(3) Bacteremia


Assessment & Plan: 


on IV abx


Status: Acute

## 2017-11-09 NOTE — PN
DATE:  11/08/2017



SUBJECTIVE:  The patient was seen.  The patient is in her room.  The

patient's family has agreed for PEG tube placement and referred to Dr. Hong.  The patient is more alert today.  Her medications were changed

yesterday.  The patient was put on Remeron 7.5 mg at bedtime, this is to

improve appetite, hopefully also for depression and also the patient was

taken off the Reglan and is on the Zofran.  The patient seems to do better

with the family is around helping her.



PHYSICAL EXAMINATION

VITAL SIGNS:  Temperature 97.5, pulse rate 105, blood pressure 96/60,

respirations 18, O2 saturation 100%.



REVIEW OF SYSTEMS:

GENERAL:  The patient is alert, verbal, speaks very limited English, seen

in room and not agitated.  Still has NG tube in place.

SKIN:  No diaphoresis.

HEENT:  No headache.

NECK:  Supple.

RESPIRATORY:  Not in acute respiratory distress.

CARDIOVASCULAR:  No chest pain.

GASTROINTESTINAL:  Appetite is very poor.

EXTREMITIES:  The patient is moving extremities.

NEUROLOGIC:  Alert, verbal, unable to communicate very well due to language

barrier.

MENTAL STATUS EXAMINATION:  Elderly female of [place] descent, alert and

verbal.  Speech is slow.  Affect is distracted.  Mood is dysphoric and

depressed.  Thought process is coherent.  Thought content no overt

paranoia.  No hallucinations or suicidal ideation.  Attention and memory

seems to be fair.  Insight and judgment fair.  Impulse control is fair.



LABORATORY DATA:  Review of labs; the patient's B12 781, random glucose is

117, and creatinine 0.6.  stool occult for blood result is negative. TSH is 
normal.



IMPRESSION:  Depressive disorder, not otherwise specified as well as major

depression with mood disorder.



RECOMMENDATION:  The patient is seen, medications reviewed, continue

Remeron 7.5 mg at bedtime.  The patient is referred to be seen by GI for

possible PEG tube placement.  Continue treatment plan as outlined.





__________________________________________

Shubham Lock MD





DD:  11/08/2017 13:55:54

DT:  11/08/2017 14:13:56

Job # [id number]





MTDD

## 2017-11-09 NOTE — PN
DATE:  11/09/2017



SUBJECTIVE:  Patient is seen.  Patient is more alert and verbal today. 

Still has NG tube.  Patient speaks limited English.  Patient states she

wants [place] food.  Patient is awaiting PEG tube as patient recently

had abdominal surgery.  Patient had hysterectomy and bilateral

salpingo-oophorectomy.  Patient is currently on Remeron 7.5 mg at bedtime

for depression and mood stabilization.  Patient has been compliant taking

her antidepressants.  Patient states she prefers to eat [place] food.



PHYSICAL EXAMINATION:

VITAL SIGNS:  Temperature is 98.7, pulse rate is 107, blood pressure

124/64, respirations 18, oxygen sat is 98%.

GENERAL:  The patient is alert, verbal, speaks limited English, seen in her

room.

SKIN:  No diaphoresis.

HEENT:  No headache, no dizziness.

NECK:  Supple.

RESPIRATORY:  No dyspnea.

CARDIOVASCULAR:  No chest pain.

GASTROINTESTINAL:  The patient prefers to eat [place] food with the help

of her family.  She has NG tube.  Patient is refusing to eat hospital food.

Patient is not complaining of abdominal pain, although she had recent

abdominal surgery.

EXTREMITIES:  The patient is moving extremities.

NEUROLOGIC:  Alert and oriented x3.

MENTAL STATUS EXAMINATION:  Elderly female of [place] descent, oriented

x3.  Mood is calm, affect is reactive.  Speech spontaneous.  Thought

process coherent.  Thought content, patient is still refusing to eat

hospital food, who wants home made food.  No psychosis.  No suicidal or

homicidal ideation.  Attention and memory seem to be fair.  Insight and

judgment fair.  Impulse control is fair.



IMPRESSION:  Depressive disorder, mood disorder secondary to medical

problems.



PLAN:  Continue Remeron 7.5 mg at bedtime for depression.  Patient is for

PEG tube, but since the patient had a recent abdominal surgery, I will

discuss with GI and with Surgery if patient can be deferred as patient

needs to recover from her previous _____ surgery to avoid complication.





__________________________________________

Shubham Lock MD



DD:  11/09/2017 9:40:11

DT:  11/09/2017 10:54:01

Job # [id number]

## 2017-11-09 NOTE — CP.PCM.PN
Subjective





- Date & Time of Evaluation


Date of Evaluation: 11/09/17


Time of Evaluation: 19:20





- Subjective


Subjective: 


Pt referred by Surgery to IR for possible PEG placement in AM. Procedure could 

not be done by GI in light of recent surgery which may pose unusual risks if 

endoscopic placement is attempted bec of the nature of the procedure. As 

laparatomies can produce extensive adhesions, a guided PEG placement may be the 

best option as NG tube feedings cannot last very long bec mucosal tissue 

necrosis may ensue 2ndry to prolonged pressure on the nasal passages. 








Objective





- Vital Signs/Intake and Output


Vital Signs (last 24 hours): 


 











Temp Pulse Resp BP Pulse Ox


 


 98.2 F   97 H  20   98/58 L  98 


 


 11/09/17 15:53  11/09/17 22:26  11/09/17 15:53  11/09/17 15:53  11/09/17 15:53








Intake and Output: 


 











 11/09/17 11/10/17





 18:59 06:59


 


Intake Total 315 290


 


Output Total 850 550


 


Balance -535 -260














- Medications


Medications: 


 Current Medications





Acetaminophen (Tylenol 325mg Tab)  325 mg PO Q4 PRN


   PRN Reason: Fever >100.4 F


Albuterol Sulfate (Albuterol 0.083% Inhal Sol (2.5 Mg/3 Ml) Ud)  2.5 mg IH RQ6 

[facility]


   Last Admin: 11/09/17 21:37 Dose:  2.5 mg


Aspirin (Ecotrin)  81 mg PO DAILY [facility]


   Last Admin: 11/09/17 10:48 Dose:  81 mg


Calcium/Vitamin D (Oyster Shell Calcium/Vitamin D 500 Mg-200 Iu)  1 tab PO TID 

[facility]


   Last Admin: 11/09/17 17:58 Dose:  1 tab


Carvedilol (Coreg)  6.25 mg PO BID [facility]


   Last Admin: 11/09/17 17:58 Dose:  6.25 mg


Dronabinol (Marinol)  2.5 mg PO BID [facility]


   Last Admin: 11/09/17 17:58 Dose:  2.5 mg


Enoxaparin Sodium (Lovenox)  50 mg SC Q12H [facility]


   Last Admin: 11/08/17 21:03 Dose:  50 mg


Hydromorphone HCl (Dilaudid)  0.5 mg IVP Q4H PRN


   PRN Reason: Pain, Mild (1-3)


   Last Admin: 11/09/17 22:41 Dose:  0.5 mg


Meropenem 500 mg/ Dextrose  100 mls @ 100 mls/hr IVPB Q8H [facility]


   Last Admin: 11/09/17 16:31 Dose:  100 mls/hr


Sodium Chloride (Sodium Chloride 0.9%)  1,000 mls @ 30 mls/hr IV .Q24H [facility]


   Last Admin: 11/09/17 17:58 Dose:  30 mls/hr


Insulin Human Regular (Novolin R)  0 unit SC ACHS [facility]


   PRN Reason: Protocol


   Last Admin: 11/09/17 21:38 Dose:  Not Given


Lactulose (Enulose)  20 gm PO Q12H [facility]


   Last Admin: 11/09/17 17:58 Dose:  20 gm


Mirtazapine (Remeron)  7.5 mg PO HS [facility]


   Last Admin: 11/09/17 21:38 Dose:  7.5 mg


Ondansetron HCl (Zofran Inj)  4 mg IVP Q4 PRN


   PRN Reason: Nausea/Vomiting


Pantoprazole Sodium (Protonix Inj)  40 mg IVP DAILY [facility]


   Last Admin: 11/09/17 09:37 Dose:  40 mg


Potassium Chloride (Potassium Chloride Oral Soln)  40 meq PO Q12 [facility]


   Last Admin: 11/09/17 21:39 Dose:  Not Given


Rosuvastatin Calcium (Crestor)  5 mg PO HS [facility]


   Last Admin: 11/09/17 21:38 Dose:  5 mg











- Labs


Labs: 


 





 11/09/17 14:55 





 11/09/17 14:55 





 











PT  11.6 SECONDS (9.7-12.2)   11/09/17  14:55    


 


INR  1.0   11/09/17  14:55    


 


APTT  34 SECONDS (21-34)   11/06/17  07:36    














- Constitutional


Appears: No Acute Distress





- Head Exam


Head Exam: NORMAL INSPECTION





- Eye Exam


Eye Exam: Normal appearance


Pupil Exam: PERRL





- ENT Exam


ENT Exam: Normal Exam





- Neck Exam


Neck Exam: Normal Inspection





- Respiratory Exam


Respiratory Exam: NORMAL BREATHING PATTERN





- GI/Abdominal Exam


GI & Abdominal Exam: Hypoactive Bowel Sounds





- Rectal Exam


Rectal Exam: Deferred





- Extremities Exam


Extremities Exam: Normal Inspection





- Back Exam


Back Exam: NORMAL INSPECTION





- Neurological Exam


Neurological Exam: Abnormal Gait


Neuro motor strength exam: Left Upper Extremity: 4, Right Upper Extremity: 2/1, 

Left Lower Extremity: 4, Right Lower Extremity: 2/1





- Psychiatric Exam


Psychiatric exam: Depressed, Flat Affect





- Skin


Skin Exam: Warm





Assessment and Plan


(1) Diabetes mellitus


Status: Chronic   





(2) Malnutrition following gastrointestinal surgery


Assessment & Plan: 


for PEG Placement


Status: Chronic   





(3) Bacteremia


Assessment & Plan: 


IV rx ongoing


Status: Acute   





(4) Anemia


Assessment & Plan: 


may need iron supplementation


Status: Acute

## 2017-11-09 NOTE — RAD
Chest x-ray single frontal view 



History: NG tube. 



Comparison: 11/07/2017 



Findings: 



NG tube extending into the stomach. Other lines and tubes stable 

position. 



Surgical clips project over the mid abdomen. 



Mild venous congestion with some mild right hilar prominence. 



Calcification at the aortic knob. 



Mild cardiomegaly. 



Degenerative changes in the spine and shoulders. 



Impression: 



No significant interval change.

## 2017-11-09 NOTE — CP.PCM.PN
Subjective





- Date & Time of Evaluation


Date of Evaluation: 11/09/17


Time of Evaluation: 15:07





- Subjective


Subjective: 





Patient does not respond to questions.





Objective





- Vital Signs/Intake and Output


Vital Signs (last 24 hours): 


 











Temp Pulse Resp BP Pulse Ox


 


 98.7 F   104 H  20   109/64   100 


 


 11/09/17 08:12  11/09/17 12:12  11/09/17 10:54  11/09/17 10:54  11/09/17 10:54








Intake and Output: 


 











 11/09/17 11/09/17





 06:59 18:59


 


Intake Total 480 


 


Output Total 2001 


 


Balance -1521 














- Medications


Medications: 


 Current Medications





Acetaminophen (Tylenol 325mg Tab)  325 mg PO Q4 PRN


   PRN Reason: Fever >100.4 F


Albuterol Sulfate (Albuterol 0.083% Inhal Sol (2.5 Mg/3 Ml) Ud)  2.5 mg IH RQ6 

[facility]


   Last Admin: 11/09/17 13:42 Dose:  2.5 mg


Aspirin (Ecotrin)  81 mg PO DAILY [facility]


   Last Admin: 11/09/17 10:48 Dose:  81 mg


Calcium/Vitamin D (Oyster Shell Calcium/Vitamin D 500 Mg-200 Iu)  1 tab PO TID 

[facility]


   Last Admin: 11/09/17 14:29 Dose:  Not Given


Carvedilol (Coreg)  6.25 mg PO BID [facility]


   Last Admin: 11/09/17 10:52 Dose:  6.25 mg


Dronabinol (Marinol)  2.5 mg PO BID [facility]


   Last Admin: 11/09/17 10:53 Dose:  2.5 mg


Enoxaparin Sodium (Lovenox)  50 mg SC Q12H [facility]


   Last Admin: 11/08/17 21:03 Dose:  50 mg


Hydromorphone HCl (Dilaudid)  0.5 mg IVP Q4H [facility]


   PRN Reason: Pain, Mild (1-3)


   Last Admin: 11/08/17 10:32 Dose:  0.5 mg


Meropenem 500 mg/ Dextrose  100 mls @ 100 mls/hr IVPB Q8H [facility]


   Last Admin: 11/09/17 08:43 Dose:  100 mls/hr


Potassium Phosphate 8.25 mmole (/ Sodium Chloride)  1,002.75 mls @ 30 mls/hr IV 

.Q24H [facility]


   Last Admin: 11/08/17 05:59 Dose:  30 mls/hr


Insulin Human Regular (Novolin R)  0 unit SC ACHS [facility]


   PRN Reason: Protocol


   Last Admin: 11/09/17 11:59 Dose:  Not Given


Lactulose (Enulose)  20 gm PO Q12H [facility]


   Last Admin: 11/09/17 05:37 Dose:  Not Given


Mirtazapine (Remeron)  7.5 mg PO HS [facility]


   Last Admin: 11/08/17 21:37 Dose:  7.5 mg


Ondansetron HCl (Zofran Inj)  4 mg IVP Q4 PRN


   PRN Reason: Nausea/Vomiting


Pantoprazole Sodium (Protonix Inj)  40 mg IVP DAILY [facility]


   Last Admin: 11/09/17 09:37 Dose:  40 mg


Potassium Chloride (Potassium Chloride Oral Soln)  40 meq PO Q12 [facility]


   Last Admin: 11/09/17 10:48 Dose:  40 meq


Rosuvastatin Calcium (Crestor)  5 mg PO [facility]


   Last Admin: 11/08/17 21:37 Dose:  5 mg











- Labs


Labs: 


 





 11/08/17 07:25 





 11/08/17 07:25 





 











PT  12.5 SECONDS (9.7-12.2)  H  11/06/17  07:36    


 


INR  1.1   11/06/17  07:36    


 


APTT  34 SECONDS (21-34)   11/06/17  07:36    














- Constitutional


Appears: No Acute Distress





- Head Exam


Head Exam: ATRAUMATIC, NORMOCEPHALIC





- Neck Exam


Neck Exam: absent: Lymphadenopathy, Thyromegaly





- Respiratory Exam


Respiratory Exam: NORMAL BREATHING PATTERN.  absent: Rales, Rhonchi, Wheezes





- Cardiovascular Exam


Cardiovascular Exam: REGULAR RHYTHM, +S1, +S2.  absent: Gallop, Rubs, Murmur





- GI/Abdominal Exam


GI & Abdominal Exam: Soft, Mass, Normal Bowel Sounds.  absent: Organomegaly


Additional comments: 





Midline laparotomy incison with staples in place; 6 cm ovoid area, firm, at 

inferior margin of incision





- Rectal Exam


Rectal Exam: Deferred





- Extremities Exam


Extremities Exam: absent: Calf Tenderness, Pedal Edema





Assessment and Plan


(1) Dysphagia as late effect of cerebrovascular accident (CVA)


Assessment & Plan: 


Patient has a midline incision with all staples still in place, making 

placement of a PEG tube in the preferred location, midline epigastrium, 

impossible.  The options are: 1) remove staples and we can proceed with 

endoscopic placement of gastrostomy; or 2) placement of gastrostomy with CT 

guidance by Interventional Radiology.


Status: Chronic

## 2017-11-10 LAB
ALBUMIN/GLOB SERPL: 0.8 {RATIO} (ref 1–2.1)
ALP SERPL-CCNC: 72 U/L (ref 38–126)
ALT SERPL-CCNC: 49 U/L (ref 9–52)
APTT BLD: 30 SECONDS (ref 21–34)
AST SERPL-CCNC: 26 U/L (ref 14–36)
BASOPHILS # BLD AUTO: 0.1 K/UL (ref 0–0.2)
BASOPHILS NFR BLD: 1.2 % (ref 0–2)
BILIRUB SERPL-MCNC: 0.6 MG/DL (ref 0.2–1.3)
BUN SERPL-MCNC: 12 MG/DL (ref 7–17)
CALCIUM SERPL-MCNC: 8.1 MG/DL (ref 8.6–10.4)
CHLORIDE SERPL-SCNC: 102 MMOL/L (ref 98–107)
CO2 SERPL-SCNC: 24 MMOL/L (ref 22–30)
EOSINOPHIL # BLD AUTO: 0.1 K/UL (ref 0–0.7)
EOSINOPHIL NFR BLD: 2.9 % (ref 0–4)
ERYTHROCYTE [DISTWIDTH] IN BLOOD BY AUTOMATED COUNT: 14.7 % (ref 11.5–14.5)
GLOBULIN SER-MCNC: 3 GM/DL (ref 2.2–3.9)
GLUCOSE SERPL-MCNC: 87 MG/DL (ref 65–105)
HCT VFR BLD CALC: 24.1 % (ref 34–47)
INR PPP: 1.1
LYMPHOCYTES # BLD AUTO: 1 K/UL (ref 1–4.3)
LYMPHOCYTES NFR BLD AUTO: 19.3 % (ref 20–40)
MCH RBC QN AUTO: 30.2 PG (ref 27–31)
MCHC RBC AUTO-ENTMCNC: 33.2 G/DL (ref 33–37)
MCV RBC AUTO: 91 FL (ref 81–99)
MONOCYTES # BLD: 0.6 K/UL (ref 0–0.8)
MONOCYTES NFR BLD: 11.9 % (ref 0–10)
NRBC BLD AUTO-RTO: 0.1 % (ref 0–2)
PLATELET # BLD: 262 K/UL (ref 130–400)
PMV BLD AUTO: 8.6 FL (ref 7.2–11.7)
POTASSIUM SERPL-SCNC: 4.5 MMOL/L (ref 3.6–5.2)
PROT SERPL-MCNC: 5.6 G/DL (ref 6.3–8.3)
SODIUM SERPL-SCNC: 133 MMOL/L (ref 132–148)
WBC # BLD AUTO: 5.1 K/UL (ref 4.8–10.8)

## 2017-11-10 PROCEDURE — 0DH63UZ INSERTION OF FEEDING DEVICE INTO STOMACH, PERCUTANEOUS APPROACH: ICD-10-PCS | Performed by: RADIOLOGY

## 2017-11-10 RX ADMIN — HUMAN INSULIN SCH: 100 INJECTION, SOLUTION SUBCUTANEOUS at 12:48

## 2017-11-10 RX ADMIN — SODIUM CHLORIDE SCH MLS/HR: 0.9 INJECTION, SOLUTION INTRAVENOUS at 00:20

## 2017-11-10 RX ADMIN — ALBUTEROL SULFATE SCH: 2.5 SOLUTION RESPIRATORY (INHALATION) at 01:22

## 2017-11-10 RX ADMIN — HUMAN INSULIN SCH: 100 INJECTION, SOLUTION SUBCUTANEOUS at 21:14

## 2017-11-10 RX ADMIN — STANDARDIZED SENNA CONCENTRATE AND DOCUSATE SODIUM SCH: 8.6; 5 TABLET, FILM COATED ORAL at 21:13

## 2017-11-10 RX ADMIN — CALCIUM CARBONATE-VITAMIN D TAB 500 MG-200 UNIT SCH: 500-200 TAB at 17:20

## 2017-11-10 RX ADMIN — POTASSIUM CHLORIDE SCH: 1.5 SOLUTION ORAL at 21:13

## 2017-11-10 RX ADMIN — HUMAN INSULIN SCH: 100 INJECTION, SOLUTION SUBCUTANEOUS at 07:57

## 2017-11-10 RX ADMIN — PANTOPRAZOLE SODIUM SCH: 40 GRANULE, DELAYED RELEASE ORAL at 11:00

## 2017-11-10 RX ADMIN — CALCIUM CARBONATE-VITAMIN D TAB 500 MG-200 UNIT SCH: 500-200 TAB at 11:00

## 2017-11-10 RX ADMIN — CALCIUM CARBONATE-VITAMIN D TAB 500 MG-200 UNIT SCH: 500-200 TAB at 13:39

## 2017-11-10 RX ADMIN — POTASSIUM CHLORIDE SCH: 1.5 SOLUTION ORAL at 11:00

## 2017-11-10 RX ADMIN — SODIUM CHLORIDE SCH MLS/HR: 0.9 INJECTION, SOLUTION INTRAVENOUS at 09:35

## 2017-11-10 RX ADMIN — ALBUTEROL SULFATE SCH MG: 2.5 SOLUTION RESPIRATORY (INHALATION) at 07:00

## 2017-11-10 RX ADMIN — SODIUM CHLORIDE SCH MLS/HR: 0.9 INJECTION, SOLUTION INTRAVENOUS at 21:13

## 2017-11-10 NOTE — PN
DATE:  11/10/2017



SUBJECTIVE:  The patient is seen.  The patient had PEG tube placement today

and she was in her room, speaks limited English, but seems to be more alert,, 
not agitated, refusing to eat.



PHYSICAL EXAMINATION

VITAL SIGNS:  Temperature 98.1, pulse rate 89, blood pressure is 127/80,

respiration 16, oxygen saturation 97%.

GENERAL:  The patient is alert, verbal, seen her room resting, mumbling at

times.

SKIN:  No diaphoresis.

HEENT:  No headache, no dizziness.

NECK:  Supple.

RESPIRATORY:  No dyspnea.

CARDIOVASCULAR:  No chest pain.

GASTROINTESTINAL:  Appetite is still poor.  No nausea, vomiting.  The

patient is status post PEG tube placement.

EXTREMITIES:  Moving extremities.

MUSCULOSKELETAL:  Feels weak.

NEUROLOGIC:  Alert, verbal.

GENITOURINARY:  No dysuria.



MENTAL STATUS EXAMINATION:  Elderly female who looks her stated age,

oriented x2.  Mood is dysphoric.  Affect is reactive.  Speech spontaneous. 

The patient still looks depressed.  Thought process coherent.  Thought

content; the patient wants her family to take  care of her.  No overt 
psychosis. 

No suicidal ideation.  The patient noted to be eating only food brought in

by her family.  As stated, no psychosis.  No suicidal ideation.  Attention

and memory seemed to be fair.  Insight and judgement fair.  Impulse control

is fair.



IMPRESSION: Depressive disorder, not otherwise specified; mood disorder;

history of failure to thrive.



PLAN AND RECOMMENDATION:  The patient is seen, medications reviewed. 

Continue Remeron 7.5 mg for depression, mood stabilization.  The patient

has a PEG tube now to help her for her nutrition..  Continue treatment plan as

outlined.  The patient may go back to the nursing home once medically

cleared.  The patient, however, seems to be better when her family is

around.





__________________________________________

Shubham Lock MD





DD:  11/10/2017 18:01:22

DT:  11/10/2017 18:57:14

Job # [id number]

SANDY

## 2017-11-10 NOTE — CP.PCM.PN
Subjective





- Date & Time of Evaluation


Date of Evaluation: 11/10/17


Time of Evaluation: 07:00





- Subjective


Subjective: 


SURGERY PROGRESS NOTE FOR DR. GARNER





Patient seen and examined at bedside. Every other staple was removed. Patient 

is going for G tube placement with IR today.





Objective





- Vital Signs/Intake and Output


Vital Signs (last 24 hours): 


 











Temp Pulse Resp BP Pulse Ox


 


 97.9 F   96 H  20   115/73   98 


 


 11/10/17 10:51  11/10/17 10:51  11/10/17 10:51  11/10/17 10:51  11/10/17 10:51








Intake and Output: 


 











 11/10/17 11/10/17





 06:59 18:59


 


Intake Total 600 


 


Output Total 1150 


 


Balance -550 














- Medications


Medications: 


 Current Medications





Acetaminophen (Tylenol 325mg Tab)  325 mg PO Q4 PRN


   PRN Reason: Fever >100.4 F


Calcium/Vitamin D (Oyster Shell Calcium/Vitamin D 500 Mg-200 Iu)  1 tab PO TID 

[facility]


   Last Admin: 11/10/17 11:00 Dose:  Not Given


Carvedilol (Coreg)  6.25 mg PO BID [facility]


   Last Admin: 11/10/17 11:00 Dose:  Not Given


Dronabinol (Marinol)  2.5 mg PO BID [facility]


   Last Admin: 11/10/17 11:00 Dose:  Not Given


Enoxaparin Sodium (Lovenox)  50 mg SC Q12H [facility]


   Last Admin: 11/08/17 21:03 Dose:  50 mg


Hydromorphone HCl (Dilaudid)  0.5 mg IVP Q4H PRN


   PRN Reason: Pain, Mild (1-3)


   Last Admin: 11/09/17 22:41 Dose:  0.5 mg


Meropenem 500 mg/ Dextrose  100 mls @ 100 mls/hr IVPB Q8H [facility]


   Last Admin: 11/10/17 09:35 Dose:  100 mls/hr


Sodium Chloride (Sodium Chloride 0.9%)  1,000 mls @ 30 mls/hr IV .Q24H [facility]


   Last Admin: 11/09/17 17:58 Dose:  30 mls/hr


Insulin Human Regular (Novolin R)  0 unit SC ACHS [facility]


   PRN Reason: Protocol


   Last Admin: 11/10/17 07:57 Dose:  Not Given


Lactulose (Enulose)  20 gm PO HS [facility]


Mirtazapine (Remeron)  7.5 mg PO [facility]


   Last Admin: 11/09/17 21:38 Dose:  7.5 mg


Ondansetron HCl (Zofran Inj)  4 mg IVP Q4 PRN


   PRN Reason: Nausea/Vomiting


Pantoprazole Sodium (Protonix Susp)  40 mg PO DAILY [facility]


   Last Admin: 11/10/17 11:00 Dose:  Not Given


Potassium Chloride (Potassium Chloride Oral Soln)  40 meq PO Q12 [facility]


   Last Admin: 11/10/17 11:00 Dose:  Not Given


Rosuvastatin Calcium (Crestor)  5 mg PO HS [facility]


   Last Admin: 11/09/17 21:38 Dose:  5 mg


Senna/Docusate Sodium (Senokot S 50 Mg-8.6 Mg)  2 tab PO [facility]











- Labs


Labs: 


 





 11/10/17 07:38 





 11/10/17 07:38 





 











PT  11.9 SECONDS (9.7-12.2)   11/10/17  07:38    


 


INR  1.1   11/10/17  07:38    


 


APTT  30 SECONDS (21-34)   11/10/17  07:38    














- Constitutional


Appears: Non-toxic, No Acute Distress, Chronically Ill





- Head Exam


Head Exam: ATRAUMATIC, NORMAL INSPECTION





- Respiratory Exam


Respiratory Exam: NORMAL BREATHING PATTERN.  absent: Respiratory Distress





- Cardiovascular Exam


Cardiovascular Exam: +S1, +S2





- GI/Abdominal Exam


GI & Abdominal Exam: Firm (firm area lower portion of midline incision), Soft.  

absent: Distended, Guarding, Rigid, Tenderness, Rebound





- Neurological Exam


Neurological Exam: Alert, Awake.  absent: Oriented x3





- Psychiatric Exam


Psychiatric exam: Normal Affect, Normal Mood





- Skin


Skin Exam: Dry, Normal Color, Warm





Assessment and Plan





- Assessment and Plan (Free Text)


Assessment: 


77 y/o F w/ uterine infection, L adenexal mass POD#15 s/p KASSY BSO





- Removed every other staple this morning


- Cont abx per ID


- Encouraged OOB to chair/IS use


- GI/DVT PPx


- Patient going for IR G tube placement today, GI has signed off





Plan discussed w/ Dr. Radha Eaton PGY-3

## 2017-11-10 NOTE — PCM.IRPREO
Pre Procedure Note





- History


Proposed Procedure: Gastrostomy tube placement


Pre-Op Diagnosis: Dysphagia





- Pre Procedure


Were any radiologic studies performed in the last 12 months: Not Applicable


Was medical management performed in the past 24 months: Yes


List of medical management performed: Nasogastric tube


Clinical indication for the procedure: FAILURE TO THRIVE


Have risks and benefits been explained to the patient: Yes


Risks and benefits been explained to the patient: Risks and benefits were 

explained to the Pt's daughter and her grandson by the primary, Dr. Rickey Collazo, [phone].  Risks also explained to the grandson, the next of kin, by 

myself.


Have alternatives to surgery explained to the patient as applicable: Yes (NGT, 

TPN)





- Allergies


Allergies: 


Allergies





No Known Allergies Allergy (Verified 10/24/17 06:20)


 











- Physical Exam


Vital Signs: 


 Vital Signs











  11/10/17 11/10/17 11/10/17





  07:26 08:15 10:51


 


Temperature  97.8 F 97.9 F


 


Pulse Rate 93 H 90 96 H


 


Respiratory  18 20





Rate   


 


Blood Pressure  107/66 115/73


 


O2 Sat by Pulse  97 98





Oximetry   














- Impression


Impression: Pt with failure to thrive refered for G tube placement. Plan G tube 

placement. Informed consent obtained.





- Date & Time


Date: 11/10/17


Time: 12:20

## 2017-11-10 NOTE — CP.PCM.PN
Subjective





- Date & Time of Evaluation


Date of Evaluation: 11/10/17


Time of Evaluation: 09:00





- Subjective


Subjective: 





s/p NGT


tolerated well


IV rx in progress





Objective





- Vital Signs/Intake and Output


Vital Signs (last 24 hours): 


 











Temp Pulse Resp BP Pulse Ox


 


 98.1 F   89   18   127/80   97 


 


 11/10/17 15:24  11/10/17 15:24  11/10/17 15:24  11/10/17 15:24  11/10/17 15:24








Intake and Output: 


 











 11/10/17 11/10/17





 06:59 18:59


 


Intake Total 600 129


 


Output Total 1150 525


 


Balance -550 -396














- Medications


Medications: 


 Current Medications





Acetaminophen (Tylenol 325mg Tab)  325 mg PO Q4 PRN


   PRN Reason: Fever >100.4 F


Calcium/Vitamin D (Oyster Shell Calcium/Vitamin D 500 Mg-200 Iu)  1 tab PO TID 

[facility]


   Last Admin: 11/10/17 17:20 Dose:  Not Given


Carvedilol (Coreg)  6.25 mg PO BID [facility]


   Last Admin: 11/10/17 17:20 Dose:  Not Given


Dronabinol (Marinol)  2.5 mg PO BID [facility]


   Last Admin: 11/10/17 17:20 Dose:  Not Given


Enoxaparin Sodium (Lovenox)  50 mg SC Q12H [facility]


   Last Admin: 11/08/17 21:03 Dose:  50 mg


Hydromorphone HCl (Dilaudid)  0.5 mg IVP Q4H [facility]


   PRN Reason: Pain, Mild (1-3)


   Last Admin: 11/09/17 22:41 Dose:  0.5 mg


Meropenem 500 mg/ Dextrose  100 mls @ 100 mls/hr IVPB Q8H [facility]


   Last Admin: 11/10/17 09:35 Dose:  100 mls/hr


Sodium Chloride (Sodium Chloride 0.9%)  1,000 mls @ 30 mls/hr IV .Q24H [facility]


   Last Admin: 11/10/17 17:21 Dose:  30 mls/hr


Insulin Human Regular (Novolin R)  0 unit SC ACHS [facility]


   PRN Reason: Protocol


   Last Admin: 11/10/17 12:48 Dose:  Not Given


Lactulose (Enulose)  20 gm PO HS [facility]


Mirtazapine (Remeron)  7.5 mg PO HS [facility]


   Last Admin: 11/09/17 21:38 Dose:  7.5 mg


Ondansetron HCl (Zofran Inj)  4 mg IVP Q4 PRN


   PRN Reason: Nausea/Vomiting


Pantoprazole Sodium (Protonix Susp)  40 mg PO DAILY [facility]


   Last Admin: 11/10/17 11:00 Dose:  Not Given


Potassium Chloride (Potassium Chloride Oral Soln)  40 meq PO Q12 [facility]


   Last Admin: 11/10/17 11:00 Dose:  Not Given


Rosuvastatin Calcium (Crestor)  5 mg PO HS [facility]


   Last Admin: 11/09/17 21:38 Dose:  5 mg


Senna/Docusate Sodium (Senokot S 50 Mg-8.6 Mg)  2 tab PO [facility]











- Labs


Labs: 


 





 11/10/17 07:38 





 11/10/17 07:38 





 











PT  11.9 SECONDS (9.7-12.2)   11/10/17  07:38    


 


INR  1.1   11/10/17  07:38    


 


APTT  30 SECONDS (21-34)   11/10/17  07:38    














- Constitutional


Appears: Non-toxic





- Head Exam


Head Exam: NORMOCEPHALIC





- Eye Exam


Eye Exam: absent: Scleral icterus





- ENT Exam


ENT Exam: Mucous Membranes Dry





- Neck Exam


Neck Exam: absent: Lymphadenopathy





- Respiratory Exam


Respiratory Exam: Decreased Breath Sounds





- Cardiovascular Exam


Cardiovascular Exam: REGULAR RHYTHM





- GI/Abdominal Exam


GI & Abdominal Exam: Distended





Assessment and Plan


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


Status: Chronic   





(2) Diabetes mellitus


Status: Chronic   





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


Status: Deleted

## 2017-11-10 NOTE — PCM.SURG1
Surgeon's Initial Post Op Note





- Surgeon's Notes


Surgeon: Shilo Crooks MD


Assistant: NONE


Type of Anesthesia: IV Sedation


Pre-Operative Diagnosis: Failure to thrive


Operative Findings: NGT placed into the stomach and stomach was inflated.


Post-Operative Diagnosis: Failure to thrive


Operation Performed: 16 fr G tube placement.


Specimen/Specimens Removed: none


Estimated Blood Loss: EBL {In ML}: 4


Blood Products Given: N/A


Drains Used: No Drains


Post-Op Condition: Poor


Date of Surgery/Procedure: 11/10/17


Time of Surgery/Procedure: 13:00

## 2017-11-10 NOTE — CP.PCM.PN
Subjective





- Date & Time of Evaluation


Date of Evaluation: 11/10/17


Time of Evaluation: 08:45





- Subjective


Subjective: 





Mental status is unchanged.  She does not respond to questions.





Objective





- Vital Signs/Intake and Output


Vital Signs (last 24 hours): 


 











Temp Pulse Resp BP Pulse Ox


 


 97.8 F   90   18   107/66   97 


 


 11/10/17 08:15  11/10/17 08:15  11/10/17 08:15  11/10/17 08:15  11/10/17 08:15








Intake and Output: 


 











 11/10/17 11/10/17





 06:59 18:59


 


Intake Total 600 


 


Output Total 1150 


 


Balance -550 














- Medications


Medications: 


 Current Medications





Acetaminophen (Tylenol 325mg Tab)  325 mg PO Q4 PRN


   PRN Reason: Fever >100.4 F


Albuterol Sulfate (Albuterol 0.083% Inhal Sol (2.5 Mg/3 Ml) Ud)  2.5 mg IH RQ6 

[facility]


   Last Admin: 11/10/17 07:00 Dose:  2.5 mg


Calcium/Vitamin D (Oyster Shell Calcium/Vitamin D 500 Mg-200 Iu)  1 tab PO TID 

[facility]


   Last Admin: 11/09/17 17:58 Dose:  1 tab


Carvedilol (Coreg)  6.25 mg PO BID [facility]


   Last Admin: 11/09/17 17:58 Dose:  6.25 mg


Dronabinol (Marinol)  2.5 mg PO BID [facility]


   Last Admin: 11/09/17 17:58 Dose:  2.5 mg


Enoxaparin Sodium (Lovenox)  50 mg SC Q12H [facility]


   Last Admin: 11/08/17 21:03 Dose:  50 mg


Hydromorphone HCl (Dilaudid)  0.5 mg IVP Q4H [facility]


   PRN Reason: Pain, Mild (1-3)


   Last Admin: 11/09/17 22:41 Dose:  0.5 mg


Meropenem 500 mg/ Dextrose  100 mls @ 100 mls/hr IVPB Q8H [facility]


   Last Admin: 11/10/17 00:20 Dose:  100 mls/hr


Sodium Chloride (Sodium Chloride 0.9%)  1,000 mls @ 30 mls/hr IV .Q24H [facility]


   Last Admin: 11/09/17 17:58 Dose:  30 mls/hr


Insulin Human Regular (Novolin R)  0 unit SC ACHS [facility]


   PRN Reason: Protocol


   Last Admin: 11/10/17 07:57 Dose:  Not Given


Lactulose (Enulose)  20 gm PO HS [facility]


Mirtazapine (Remeron)  7.5 mg PO [facility]


   Last Admin: 11/09/17 21:38 Dose:  7.5 mg


Ondansetron HCl (Zofran Inj)  4 mg IVP Q4 PRN


   PRN Reason: Nausea/Vomiting


Pantoprazole Sodium (Protonix Susp)  40 mg PO DAILY [facility]


Potassium Chloride (Potassium Chloride Oral Soln)  40 meq PO Q12 [facility]


   Last Admin: 11/09/17 21:39 Dose:  Not Given


Rosuvastatin Calcium (Crestor)  5 mg PO [facility]


   Last Admin: 11/09/17 21:38 Dose:  5 mg


Senna/Docusate Sodium (Senokot S 50 Mg-8.6 Mg)  2 tab PO [facility]











- Labs


Labs: 


 





 11/10/17 07:38 





 11/10/17 07:38 





 











PT  11.9 SECONDS (9.7-12.2)   11/10/17  07:38    


 


INR  1.1   11/10/17  07:38    


 


APTT  34 SECONDS (21-34)   11/06/17  07:36    














- Constitutional


Appears: No Acute Distress





- Head Exam


Head Exam: ATRAUMATIC, NORMOCEPHALIC





- Eye Exam


Eye Exam: EOMI, PERRL





- Neck Exam


Neck Exam: absent: Lymphadenopathy, Thyromegaly





- Respiratory Exam


Respiratory Exam: NORMAL BREATHING PATTERN.  absent: Rales, Rhonchi, Wheezes





- Cardiovascular Exam


Cardiovascular Exam: REGULAR RHYTHM, +S1, +S2.  absent: Gallop, Rubs, Murmur





- GI/Abdominal Exam


GI & Abdominal Exam: Soft, Normal Bowel Sounds.  absent: Mass, Organomegaly


Additional comments: 





Ovoid mass associated with caudad margin of incision is unchanged





- Rectal Exam


Rectal Exam: Deferred





- Extremities Exam


Extremities Exam: absent: Calf Tenderness, Pedal Edema





Assessment and Plan


(1) Dysphagia as late effect of cerebrovascular accident (CVA)


Assessment & Plan: 


Patient is scheduled for gastrostomy tube placement by IR.  Will sign off.  

Please recall if new GI problems arise.


Status: Chronic

## 2017-11-11 RX ADMIN — HYDROMORPHONE HYDROCHLORIDE PRN MG: 1 INJECTION, SOLUTION INTRAMUSCULAR; INTRAVENOUS; SUBCUTANEOUS at 18:36

## 2017-11-11 RX ADMIN — PANTOPRAZOLE SODIUM SCH MG: 40 GRANULE, DELAYED RELEASE ORAL at 10:04

## 2017-11-11 RX ADMIN — POTASSIUM CHLORIDE SCH MEQ: 1.5 SOLUTION ORAL at 22:11

## 2017-11-11 RX ADMIN — CALCIUM CARBONATE-VITAMIN D TAB 500 MG-200 UNIT SCH TAB: 500-200 TAB at 13:19

## 2017-11-11 RX ADMIN — HUMAN INSULIN SCH: 100 INJECTION, SOLUTION SUBCUTANEOUS at 21:50

## 2017-11-11 RX ADMIN — HUMAN INSULIN SCH UNIT: 100 INJECTION, SOLUTION SUBCUTANEOUS at 12:30

## 2017-11-11 RX ADMIN — SODIUM CHLORIDE SCH MLS/HR: 0.9 INJECTION, SOLUTION INTRAVENOUS at 16:39

## 2017-11-11 RX ADMIN — HUMAN INSULIN SCH: 100 INJECTION, SOLUTION SUBCUTANEOUS at 08:35

## 2017-11-11 RX ADMIN — POTASSIUM CHLORIDE SCH MEQ: 1.5 SOLUTION ORAL at 10:04

## 2017-11-11 RX ADMIN — CALCIUM CARBONATE-VITAMIN D TAB 500 MG-200 UNIT SCH TAB: 500-200 TAB at 10:04

## 2017-11-11 RX ADMIN — SODIUM CHLORIDE SCH MLS/HR: 0.9 INJECTION, SOLUTION INTRAVENOUS at 00:30

## 2017-11-11 RX ADMIN — HUMAN INSULIN SCH: 100 INJECTION, SOLUTION SUBCUTANEOUS at 16:39

## 2017-11-11 RX ADMIN — CALCIUM CARBONATE-VITAMIN D TAB 500 MG-200 UNIT SCH TAB: 500-200 TAB at 18:27

## 2017-11-11 RX ADMIN — STANDARDIZED SENNA CONCENTRATE AND DOCUSATE SODIUM SCH TAB: 8.6; 5 TABLET, FILM COATED ORAL at 22:11

## 2017-11-11 RX ADMIN — SODIUM CHLORIDE SCH MLS/HR: 0.9 INJECTION, SOLUTION INTRAVENOUS at 09:05

## 2017-11-11 NOTE — PN
DATE:  11/11/2017



FOLLOWUP PROGRESS NOTE



SUBJECTIVE:  The patient seen.  The patient is doing much better, still

speaks limited English, but tolerating PEG tube feeding.  The patient refusing 
to

eat regular food.



PHYSICAL EXAMINATION:

VITAL SIGNS:  Temperature 97.5; pulse rate is 89, repeat pulse rate is 89;

blood pressure is 119/70; respirations 18; oxygen saturation is 100%.

GENERAL:  The patient is alert, verbal, seen in her room, not in acute

respiratory distress, tolerating PEG tube feeding SKIN:  No pruritus..

HEENT:  No headache, no dizziness.

RESPIRATORY:  No dyspnea.

CARDIOVASCULAR:  No chest pain.

GASTROINTESTINAL:  The patient is tolerating PEG tube feeding.

EXTREMITIES:  The patient is complaining of pain with tremors.

NEUROLOGIC:  Alert, verbal.

MENTAL STATUS EXAMINATION:  Elderly female of [place] descent.  Alert,

verbal, speech is spontaneous.  Affect is reactive.  Mood calm.  Thought

process coherent.  Thought content no reported psychosis or suicidal

ideation, which seems to be brighter.  Attention and memory seemed to be

fair.  Insight and judgment fair.  Impulse control is fair.



IMPRESSION:  Depressive disorder, not otherwise specified and mood

disorder.



PLAN AND RECOMMENDATIONS:  The patient seen, meds reviewed.  Continue PEG

tube feeding, continue Remeron 7.5 mg at bedtime for mood stabilization. 

The patient also takes antibiotics.  Once the patient medically stable, the

patient can go back to the nursing home, also monitor her blood pressures. 

The patient is taking Coreg as well as Remeron and Remeron can sometimes

cause drop in blood pressure especially in elderly when given together with

hypertensive medications.





__________________________________________

Shubham Lock MD





DD:  11/11/2017 16:19:18

DT:  11/11/2017 19:07:14

Job # [id number]





SANDY

## 2017-11-11 NOTE — CP.PCM.PN
Subjective





- Date & Time of Evaluation


Date of Evaluation: 11/11/17


Time of Evaluation: 06:15





- Subjective


Subjective: 





General Surgery Note for Dr. Garcia





Patient seen and examined at bedside. No acute event overnight. She is s/p PEG 

tube placement POD#1. Tube feeds being started today by primary. Staples were 

removed.











Objective





- Vital Signs/Intake and Output


Vital Signs (last 24 hours): 


 











Temp Pulse Resp BP Pulse Ox


 


 98.3 F   89   20   115/70   100 


 


 11/10/17 23:25  11/10/17 23:25  11/10/17 23:25  11/10/17 23:25  11/10/17 23:25








Intake and Output: 


 











 11/11/17 11/11/17





 06:59 18:59


 


Intake Total 580 


 


Output Total 1000 


 


Balance -420 














- Medications


Medications: 


 Current Medications





Acetaminophen (Tylenol 325mg Tab)  325 mg PO Q4 PRN


   PRN Reason: Fever >100.4 F


Calcium/Vitamin D (Oyster Shell Calcium/Vitamin D 500 Mg-200 Iu)  1 tab PO TID 

[facility]


   Last Admin: 11/10/17 17:20 Dose:  Not Given


Carvedilol (Coreg)  6.25 mg PO BID [facility]


   Last Admin: 11/10/17 17:20 Dose:  Not Given


Dronabinol (Marinol)  2.5 mg PO BID [facility]


   Last Admin: 11/10/17 17:20 Dose:  Not Given


Enoxaparin Sodium (Lovenox)  50 mg SC Q12H [facility]


   Last Admin: 11/08/17 21:03 Dose:  50 mg


Meropenem 500 mg/ Dextrose  100 mls @ 100 mls/hr IVPB Q8H [facility]


   Last Admin: 11/11/17 00:30 Dose:  100 mls/hr


Sodium Chloride (Sodium Chloride 0.9%)  1,000 mls @ 30 mls/hr IV .Q24H [facility]


   Last Admin: 11/10/17 17:21 Dose:  30 mls/hr


Insulin Human Regular (Novolin R)  0 unit SC ACHS [facility]


   PRN Reason: Protocol


   Last Admin: 11/10/17 21:14 Dose:  Not Given


Lactulose (Enulose)  20 gm PO HS [facility]


   Last Admin: 11/10/17 21:11 Dose:  Not Given


Mirtazapine (Remeron)  7.5 mg PO HS [facility]


   Last Admin: 11/10/17 21:13 Dose:  Not Given


Ondansetron HCl (Zofran Inj)  4 mg IVP Q4 PRN


   PRN Reason: Nausea/Vomiting


Pantoprazole Sodium (Protonix Susp)  40 mg PO DAILY [facility]


   Last Admin: 11/10/17 11:00 Dose:  Not Given


Potassium Chloride (Potassium Chloride Oral Soln)  40 meq PO Q12 [facility]


   Last Admin: 11/10/17 21:13 Dose:  Not Given


Rosuvastatin Calcium (Crestor)  5 mg PO HS [facility]


   Last Admin: 11/10/17 21:11 Dose:  Not Given


Senna/Docusate Sodium (Senokot S 50 Mg-8.6 Mg)  2 tab PO HS [facility]


   Last Admin: 11/10/17 21:13 Dose:  Not Given











- Labs


Labs: 


 





 11/10/17 07:38 





 11/10/17 07:38 





 











PT  11.9 SECONDS (9.7-12.2)   11/10/17  07:38    


 


INR  1.1   11/10/17  07:38    


 


APTT  30 SECONDS (21-34)   11/10/17  07:38    














- Constitutional


Appears: No Acute Distress





- Head Exam


Head Exam: ATRAUMATIC, NORMOCEPHALIC





- ENT Exam


ENT Exam: Mucous Membranes Moist





- Respiratory Exam


Respiratory Exam: NORMAL BREATHING PATTERN





- Cardiovascular Exam


Cardiovascular Exam: REGULAR RHYTHM





- GI/Abdominal Exam


GI & Abdominal Exam: Soft.  absent: Guarding, Rigid, Tenderness, Rebound


Additional comments: 





midline incision clean dry and intact after staples removed





- Neurological Exam


Neurological Exam: Awake





- Psychiatric Exam


Psychiatric exam: Normal Affect, Normal Mood





- Skin


Skin Exam: Dry, Intact, Warm





Assessment and Plan





- Assessment and Plan (Free Text)


Plan: 





76 F w/ uterine infection, L adenexal mass POD#16 s/p KASSY BSO, s/p PEG tube 

placement POD#1





-Staples removed today


-Encouraged OOB to chair/IS use


-GI/DVT PPx


-Management as per primary


-No further surgical intervention requires at this time. Please reconsult as 

needed in the future. Thank you


-Discussed with Dr. Radha Copeland PGY1

## 2017-11-12 VITALS — RESPIRATION RATE: 20 BRPM

## 2017-11-12 RX ADMIN — HYDROMORPHONE HYDROCHLORIDE PRN MG: 1 INJECTION, SOLUTION INTRAMUSCULAR; INTRAVENOUS; SUBCUTANEOUS at 19:11

## 2017-11-12 RX ADMIN — HYDROMORPHONE HYDROCHLORIDE PRN MG: 1 INJECTION, SOLUTION INTRAMUSCULAR; INTRAVENOUS; SUBCUTANEOUS at 13:30

## 2017-11-12 RX ADMIN — HUMAN INSULIN SCH: 100 INJECTION, SOLUTION SUBCUTANEOUS at 12:05

## 2017-11-12 RX ADMIN — CALCIUM CARBONATE-VITAMIN D TAB 500 MG-200 UNIT SCH TAB: 500-200 TAB at 09:53

## 2017-11-12 RX ADMIN — CALCIUM CARBONATE-VITAMIN D TAB 500 MG-200 UNIT SCH TAB: 500-200 TAB at 13:30

## 2017-11-12 RX ADMIN — HUMAN INSULIN SCH UNIT: 100 INJECTION, SOLUTION SUBCUTANEOUS at 08:10

## 2017-11-12 RX ADMIN — PANTOPRAZOLE SODIUM SCH MG: 40 GRANULE, DELAYED RELEASE ORAL at 09:53

## 2017-11-12 RX ADMIN — SODIUM CHLORIDE SCH MLS/HR: 0.9 INJECTION, SOLUTION INTRAVENOUS at 09:00

## 2017-11-12 RX ADMIN — SODIUM CHLORIDE SCH MLS/HR: 0.9 INJECTION, SOLUTION INTRAVENOUS at 17:48

## 2017-11-12 RX ADMIN — POTASSIUM CHLORIDE SCH MEQ: 1.5 SOLUTION ORAL at 09:52

## 2017-11-12 RX ADMIN — HUMAN INSULIN SCH: 100 INJECTION, SOLUTION SUBCUTANEOUS at 17:48

## 2017-11-12 RX ADMIN — CALCIUM CARBONATE-VITAMIN D TAB 500 MG-200 UNIT SCH TAB: 500-200 TAB at 17:48

## 2017-11-12 RX ADMIN — SODIUM CHLORIDE SCH MLS/HR: 0.9 INJECTION, SOLUTION INTRAVENOUS at 00:18

## 2017-11-12 NOTE — CP.PCM.PN
Subjective





- Date & Time of Evaluation


Date of Evaluation: 11/10/17


Time of Evaluation: 22:15





- Subjective


Subjective: 


Late Entry for 11/10/2017:


> Pt had PEG placement today under fluoroscopic guidance. Had spoken to Dr. Crooks prior to surgery on his call to confirm family consent for PEG placement. 

Advised him pt's family consented as detailed in 11/8/17 notes. Surgery 

proceeded without apparent complications, and pt's PEG clamped x 24 hours prior 

to use. Pt IS tolerating po given by family at this time, but only when given 

by family. As previously documented, pt will only take food from family; this 

allows better chances for recovery if pt is refusing to eat po. 





Objective





- Vital Signs/Intake and Output


Vital Signs (last 24 hours): 


 











Temp Pulse Resp BP Pulse Ox


 


 98.4 F   91 H  20   113/70   100 


 


 11/12/17 07:53  11/12/17 08:00  11/12/17 07:53  11/12/17 07:53  11/12/17 07:53








Intake and Output: 


 











 11/12/17 11/12/17





 06:59 18:59


 


Intake Total 953 


 


Output Total 900 375


 


Balance 53 -375














- Medications


Medications: 


 Current Medications





Acetaminophen (Tylenol 325mg Tab)  325 mg PO Q4 PRN


   PRN Reason: Fever >100.4 F


Calcium/Vitamin D (Oyster Shell Calcium/Vitamin D 500 Mg-200 Iu)  1 tab PO TID 

[facility]


   Last Admin: 11/12/17 13:30 Dose:  1 tab


Carvedilol (Coreg)  6.25 mg PO BID [facility]


   Last Admin: 11/12/17 09:53 Dose:  6.25 mg


Enoxaparin Sodium (Lovenox)  50 mg SC Q12H [facility]


   Last Admin: 11/08/17 21:03 Dose:  50 mg


Hydromorphone HCl (Dilaudid)  0.5 mg IVP Q8H [facility]


   PRN Reason: Pain, moderate (4-7)


Meropenem 500 mg/ Dextrose  100 mls @ 100 mls/hr IVPB Q8H [facility]


   Last Admin: 11/12/17 09:00 Dose:  100 mls/hr


Sodium Chloride (Sodium Chloride 0.9%)  1,000 mls @ 30 mls/hr IV .Q24H [facility]


   Last Admin: 11/11/17 18:32 Dose:  Not Given


Insulin Human Regular (Novolin R)  [facility]


   PRN Reason: Protocol


   Last Admin: 11/12/17 12:05 Dose:  Not Given


Lactulose (Enulose)  20 gm PO [facility]


   Last Admin: 11/11/17 22:11 Dose:  20 gm


Mirtazapine (Remeron)  7.5 mg PO [facility]


   Last Admin: 11/11/17 22:11 Dose:  7.5 mg


Ondansetron HCl (Zofran Inj)  4 mg IVP Q4 PRN


   PRN Reason: Nausea/Vomiting


Pantoprazole Sodium (Protonix Susp)  40 mg PO DAILY [facility]


Potassium Chloride (Potassium Chloride Oral Soln)  40 meq PO Q12 [facility]


   Last Admin: 11/12/17 09:52 Dose:  40 meq


Rosuvastatin Calcium (Crestor)  5 mg PO [facility]


   Last Admin: 11/11/17 22:11 Dose:  5 mg


Senna/Docusate Sodium (Senokot S 50 Mg-8.6 Mg)  2 tab PO [facility]


   Last Admin: 11/11/17 22:11 Dose:  2 tab











- Labs


Labs: 


 





 11/10/17 07:38 





 11/10/17 07:38 





 











PT  11.9 SECONDS (9.7-12.2)   11/10/17  07:38    


 


INR  1.1   11/10/17  07:38    


 


APTT  30 SECONDS (21-34)   11/10/17  07:38    














- Constitutional


Appears: No Acute Distress





- Head Exam


Head Exam: NORMAL INSPECTION





- Eye Exam


Eye Exam: Normal appearance


Pupil Exam: NORMAL ACCOMODATION





- ENT Exam


ENT Exam: Normal Exam





- Neck Exam


Neck Exam: Normal Inspection





- Respiratory Exam


Respiratory Exam: Clear to Ausculation Bilateral, NORMAL BREATHING PATTERN





- Cardiovascular Exam


Cardiovascular Exam: REGULAR RHYTHM





- GI/Abdominal Exam


GI & Abdominal Exam: Hypoactive Bowel Sounds





- Rectal Exam


Rectal Exam: Deferred





- Extremities Exam


Extremities Exam: Normal Inspection





- Back Exam


Back Exam: NORMAL INSPECTION





- Neurological Exam


Neurological Exam: Alert


Neuro motor strength exam: Left Upper Extremity: 4, Right Upper Extremity: 2/1, 

Left Lower Extremity: 4, Right Lower Extremity: 2/1





- Psychiatric Exam


Psychiatric exam: Depressed, Flat Affect





- Skin


Skin Exam: Dry, Warm





Assessment and Plan


(1) Diabetes mellitus


Assessment & Plan: 


controlled, blood sugars low, will d/c SSI for now but continue accuchecks tid


Status: Chronic   





(2) Malnutrition following gastrointestinal surgery


Assessment & Plan: 


check prealbumin


Status: Chronic   





(3) Bacteremia


Assessment & Plan: 


continue IV abx, need order from ID to confirm addtinoal 3 more weeks(?) of IV 

abx


Status: Acute   





(4) Anemia


Status: Acute   





- Assessment and Plan (Free Text)


Assessment: 





may need iron supplementation prior to d/c

## 2017-11-12 NOTE — CP.PCM.PN
Subjective





- Date & Time of Evaluation


Date of Evaluation: 11/11/17


Time of Evaluation: 09:10





- Subjective


Subjective: 


Nursing called re: use of PEg and start of tube feeding. Needed orders. 

Requested that nutrition/dietary be contacted to determine calculations and 

needed total caloric intalke considering pt's recuperation and daily energy 

expenditure. Ok'd to use PEG 24 hours after install, if no obvious bleeding and 

leaks noted. 








Objective





- Vital Signs/Intake and Output


Vital Signs (last 24 hours): 


 











Temp Pulse Resp BP Pulse Ox


 


 98.1 F   92 H  20   109/67   100 


 


 11/12/17 15:00  11/12/17 15:30  11/12/17 15:00  11/12/17 15:00  11/12/17 15:00








Intake and Output: 


 











 11/12/17 11/13/17





 18:59 06:59


 


Intake Total 240 


 


Output Total 615 


 


Balance -375 














- Medications


Medications: 


 Current Medications





Acetaminophen (Tylenol 325mg Tab)  325 mg PO Q4 PRN


   PRN Reason: Fever >100.4 F


Calcium/Vitamin D (Oyster Shell Calcium/Vitamin D 500 Mg-200 Iu)  1 tab PO TID 

[facility]


   Last Admin: 11/12/17 17:48 Dose:  1 tab


Carvedilol (Coreg)  6.25 mg PO BID [facility]


   Last Admin: 11/12/17 17:47 Dose:  6.25 mg


Enoxaparin Sodium (Lovenox)  50 mg SC Q12H [facility]


Hydromorphone HCl (Dilaudid)  0.5 mg IVP Q8H PRN


   PRN Reason: Pain, moderate (4-7)


   Last Admin: 11/12/17 19:11 Dose:  0.5 mg


Meropenem 500 mg/ Dextrose  100 mls @ 100 mls/hr IVPB Q8H [facility]


   Last Admin: 11/12/17 17:48 Dose:  100 mls/hr


Lactulose (Enulose)  20 gm PO HS [facility]


   Last Admin: 11/11/17 22:11 Dose:  20 gm


Mirtazapine (Remeron)  7.5 mg PO HS [facility]


   Last Admin: 11/11/17 22:11 Dose:  7.5 mg


Ondansetron HCl (Zofran Inj)  4 mg IVP Q4 PRN


   PRN Reason: Nausea/Vomiting


Pantoprazole Sodium (Protonix Susp)  40 mg PO DAILY [facility]


Rosuvastatin Calcium (Crestor)  5 mg PO HS [facility]


   Last Admin: 11/11/17 22:11 Dose:  5 mg











- Labs


Labs: 


 





 11/10/17 07:38 





 11/10/17 07:38 





 











PT  11.9 SECONDS (9.7-12.2)   11/10/17  07:38    


 


INR  1.1   11/10/17  07:38    


 


APTT  30 SECONDS (21-34)   11/10/17  07:38    














- Constitutional


Appears: No Acute Distress





- Head Exam


Head Exam: NORMAL INSPECTION





- Eye Exam


Eye Exam: Normal appearance


Pupil Exam: NORMAL ACCOMODATION





- ENT Exam


ENT Exam: Normal Exam





- Neck Exam


Neck Exam: Normal Inspection





- Respiratory Exam


Respiratory Exam: Clear to Ausculation Bilateral





- GI/Abdominal Exam


GI & Abdominal Exam: Normal Bowel Sounds





- Rectal Exam


Rectal Exam: Deferred





- Extremities Exam


Extremities Exam: Normal Inspection





- Back Exam


Back Exam: NORMAL INSPECTION





- Neurological Exam


Neurological Exam: Awake, CN II-XII Intact


Neuro motor strength exam: Left Upper Extremity: 4, Right Upper Extremity: 2/1, 

Left Lower Extremity: 4, Right Lower Extremity: 2/1





- Psychiatric Exam


Psychiatric exam: Depressed, Flat Affect





- Skin


Skin Exam: Normal Color, Warm





Assessment and Plan


(1) Diabetes mellitus


Assessment & Plan: 


d/c SSI to determine medication needs. pt not on insulin to this admission


Status: Chronic   





(2) Malnutrition following gastrointestinal surgery


Assessment & Plan: 


check prealbumin


Status: Chronic   





(3) Bacteremia


Assessment & Plan: 


on IV abx. confirm 3 weeks left of IV abx with ID prior to d/c to NH


Status: Acute   





(4) Anemia


Assessment & Plan: 


check iron parameters and replete iron if deficient


Status: Acute

## 2017-11-13 LAB
ALBUMIN/GLOB SERPL: 0.9 {RATIO} (ref 1–2.1)
ALP SERPL-CCNC: 71 U/L (ref 38–126)
ALT SERPL-CCNC: 34 U/L (ref 9–52)
AST SERPL-CCNC: 21 U/L (ref 14–36)
BASOPHILS # BLD AUTO: 0 K/UL (ref 0–0.2)
BASOPHILS NFR BLD: 0.7 % (ref 0–2)
BILIRUB SERPL-MCNC: 0.5 MG/DL (ref 0.2–1.3)
BUN SERPL-MCNC: 12 MG/DL (ref 7–17)
CALCIUM SERPL-MCNC: 8 MG/DL (ref 8.6–10.4)
CHLORIDE SERPL-SCNC: 100 MMOL/L (ref 98–107)
CO2 SERPL-SCNC: 27 MMOL/L (ref 22–30)
EOSINOPHIL # BLD AUTO: 0.1 K/UL (ref 0–0.7)
EOSINOPHIL NFR BLD: 2.1 % (ref 0–4)
ERYTHROCYTE [DISTWIDTH] IN BLOOD BY AUTOMATED COUNT: 15.4 % (ref 11.5–14.5)
FOLATE SERPL-MCNC: 11 NG/ML
GLOBULIN SER-MCNC: 3 GM/DL (ref 2.2–3.9)
GLUCOSE SERPL-MCNC: 161 MG/DL (ref 65–105)
HCT VFR BLD CALC: 24.6 % (ref 34–47)
IRON SERPL-MCNC: 23 UG/DL (ref 37–170)
LYMPHOCYTES # BLD AUTO: 1 K/UL (ref 1–4.3)
LYMPHOCYTES NFR BLD AUTO: 15.5 % (ref 20–40)
MAGNESIUM SERPL-MCNC: 1.7 MG/DL (ref 1.6–2.3)
MCH RBC QN AUTO: 30.9 PG (ref 27–31)
MCHC RBC AUTO-ENTMCNC: 33.7 G/DL (ref 33–37)
MCV RBC AUTO: 91.7 FL (ref 81–99)
MONOCYTES # BLD: 0.6 K/UL (ref 0–0.8)
MONOCYTES NFR BLD: 9.8 % (ref 0–10)
NRBC BLD AUTO-RTO: 0.1 % (ref 0–2)
PHOSPHATE SERPL-MCNC: 3.4 MG/DL (ref 2.5–4.5)
PLATELET # BLD: 265 K/UL (ref 130–400)
PMV BLD AUTO: 8.2 FL (ref 7.2–11.7)
POTASSIUM SERPL-SCNC: 4.1 MMOL/L (ref 3.6–5.2)
PROT SERPL-MCNC: 5.7 G/DL (ref 6.3–8.3)
SODIUM SERPL-SCNC: 132 MMOL/L (ref 132–148)
WBC # BLD AUTO: 6.3 K/UL (ref 4.8–10.8)

## 2017-11-13 RX ADMIN — ENOXAPARIN SODIUM SCH MG: 60 INJECTION SUBCUTANEOUS at 05:18

## 2017-11-13 RX ADMIN — PANTOPRAZOLE SODIUM SCH MG: 40 GRANULE, DELAYED RELEASE ORAL at 10:59

## 2017-11-13 RX ADMIN — SODIUM CHLORIDE SCH MLS/HR: 0.9 INJECTION, SOLUTION INTRAVENOUS at 17:50

## 2017-11-13 RX ADMIN — CALCIUM CARBONATE-VITAMIN D TAB 500 MG-200 UNIT SCH TAB: 500-200 TAB at 10:59

## 2017-11-13 RX ADMIN — SODIUM CHLORIDE SCH MLS/HR: 0.9 INJECTION, SOLUTION INTRAVENOUS at 08:41

## 2017-11-13 RX ADMIN — CALCIUM CARBONATE-VITAMIN D TAB 500 MG-200 UNIT SCH TAB: 500-200 TAB at 18:47

## 2017-11-13 RX ADMIN — SODIUM CHLORIDE SCH MLS/HR: 0.9 INJECTION, SOLUTION INTRAVENOUS at 00:15

## 2017-11-13 RX ADMIN — ENOXAPARIN SODIUM SCH MG: 60 INJECTION SUBCUTANEOUS at 18:48

## 2017-11-13 RX ADMIN — HYDROMORPHONE HYDROCHLORIDE PRN MG: 1 INJECTION, SOLUTION INTRAMUSCULAR; INTRAVENOUS; SUBCUTANEOUS at 05:30

## 2017-11-13 NOTE — CP.PCM.PN
Subjective





- Date & Time of Evaluation


Date of Evaluation: 11/13/17


Time of Evaluation: 17:20





- Subjective


Subjective: 


ANP for [facility] had called me regarding disposition for pt. Pt at goal for tube 

feedings, but as previously documented, tube feedings are supplementation for 

oral feedings as patient is able to tolerate po. No attempt at feeding made 

today, nor did family show up as agreed upon before. Emphasized that family 

needs to step up to the plate and have responsibility for their family member, 

inasmuch as she insists on taking food only from them. Advised that for all our 

sakes, pt should be fed and noted and seen to eat even with tube feedings. 


> In the meantime, iron studies done this am showed pt very deficient in iron 

and since pt anemic, recovery will be hampered by lack of hemotopoietic raw 

material(s). Ordered stat Ferrlic[facility] prior to d/c. Spoke with [facility], and as far as 

she understands, no set up for transport had been arranged today. 





Objective





- Vital Signs/Intake and Output


Vital Signs (last 24 hours): 


 











Temp Pulse Resp BP Pulse Ox


 


 98.4 F   99 H  20   105/66   100 


 


 11/13/17 15:00  11/13/17 16:00  11/13/17 15:00  11/13/17 15:00  11/13/17 15:00








Intake and Output: 


 











 11/13/17 11/13/17





 06:59 18:59


 


Intake Total 790 


 


Output Total 950 


 


Balance -160 














- Medications


Medications: 


 Current Medications





Acetaminophen (Tylenol 325mg Tab)  325 mg PO Q4 PRN


   PRN Reason: Fever >100.4 F


Calcium/Vitamin D (Oyster Shell Calcium/Vitamin D 500 Mg-200 Iu)  1 tab PO TID 

[facility]


   Last Admin: 11/13/17 10:59 Dose:  1 tab


Carvedilol (Coreg)  6.25 mg PO BID [facility]


   Last Admin: 11/13/17 10:59 Dose:  6.25 mg


Enoxaparin Sodium (Lovenox)  50 mg SC [facility]12H [facility]


   Last Admin: 11/13/17 05:18 Dose:  50 mg


Hydromorphone HCl (Dilaudid)  2 mg PO Q8 PRN


   PRN Reason: pain, severe (7-10)


Meropenem 500 mg/ Sodium (Chloride)  100 mls @ 100 mls/hr IVPB [facility]H [facility]


Lactulose (Enulose)  20 gm PO HS [facility]


   Last Admin: 11/12/17 21:01 Dose:  Not Given


Mirtazapine (Remeron)  7.5 mg PO HS [facility]


   Last Admin: 11/12/17 21:01 Dose:  Not Given


Ondansetron HCl (Zofran Inj)  4 mg IVP Q4 PRN


   PRN Reason: Nausea/Vomiting


Pantoprazole Sodium (Protonix Susp)  40 mg PO DAILY [facility]


   Last Admin: 11/13/17 10:59 Dose:  40 mg


Rosuvastatin Calcium (Crestor)  5 mg PO [facility]


   Last Admin: 11/12/17 21:02 Dose:  5 mg











- Labs


Labs: 


 





 11/13/17 07:02 





 11/13/17 07:02 





 











PT  11.9 SECONDS (9.7-12.2)   11/10/17  07:38    


 


INR  1.1   11/10/17  07:38    


 


APTT  30 SECONDS (21-34)   11/10/17  07:38    














Assessment and Plan


(1) Diabetes mellitus


Status: Chronic   





(2) Malnutrition following gastrointestinal surgery


Status: Chronic   





(3) Bacteremia


Status: Acute   





(4) Anemia


Status: Acute

## 2017-11-14 VITALS — OXYGEN SATURATION: 98 % | DIASTOLIC BLOOD PRESSURE: 83 MMHG | TEMPERATURE: 97.8 F | SYSTOLIC BLOOD PRESSURE: 122 MMHG

## 2017-11-14 VITALS — HEART RATE: 88 BPM

## 2017-11-14 RX ADMIN — ENOXAPARIN SODIUM SCH: 60 INJECTION SUBCUTANEOUS at 17:55

## 2017-11-14 RX ADMIN — PANTOPRAZOLE SODIUM SCH MG: 40 GRANULE, DELAYED RELEASE ORAL at 10:17

## 2017-11-14 RX ADMIN — CALCIUM CARBONATE-VITAMIN D TAB 500 MG-200 UNIT SCH TAB: 500-200 TAB at 10:17

## 2017-11-14 RX ADMIN — ENOXAPARIN SODIUM SCH MG: 60 INJECTION SUBCUTANEOUS at 05:49

## 2017-11-14 RX ADMIN — CALCIUM CARBONATE-VITAMIN D TAB 500 MG-200 UNIT SCH TAB: 500-200 TAB at 17:56

## 2017-11-14 NOTE — CP.PCM.PN
Subjective





- Date & Time of Evaluation


Date of Evaluation: 11/14/17


Time of Evaluation: 15:44





- Subjective


Subjective: 





Patient states that she feels much better and that her breathing has 

significantly improved.  Denies chest pain, cough, shortness of breath, nausea, 

vomiting and palpitations.








Objective





- Vital Signs/Intake and Output


Vital Signs (last 24 hours): 


 











Temp Pulse Resp BP Pulse Ox


 


 98.1 F   97 H  20   119/71   95 


 


 11/14/17 07:45  11/14/17 07:45  11/14/17 07:45  11/14/17 07:45  11/14/17 07:45








Intake and Output: 


 











 11/14/17 11/14/17





 06:59 18:59


 


Intake Total 300 


 


Output Total 800 500


 


Balance -500 -500














- Medications


Medications: 


 Current Medications





Acetaminophen (Tylenol 325mg Tab)  325 mg PO Q4 PRN


   PRN Reason: Fever >100.4 F


Calcium/Vitamin D (Oyster Shell Calcium/Vitamin D 500 Mg-200 Iu)  1 tab PO TID 

[facility]


   Last Admin: 11/14/17 10:17 Dose:  1 tab


Carvedilol (Coreg)  6.25 mg PO BID [facility]


   Last Admin: 11/14/17 10:17 Dose:  6.25 mg


Enoxaparin Sodium (Lovenox)  50 mg SC Q12H [facility]


   Last Admin: 11/14/17 05:49 Dose:  50 mg


Hydromorphone HCl (Dilaudid)  2 mg PO Q8 PRN


   PRN Reason: pain, severe (7-10)


   Last Admin: 11/13/17 18:47 Dose:  2 mg


Meropenem 500 mg/ Sodium (Chloride)  100 mls @ 100 mls/hr IVPB Q8H [facility]


   Last Admin: 11/14/17 10:16 Dose:  100 mls/hr


Lactulose (Enulose)  20 gm PO [facility]


   Last Admin: 11/13/17 21:47 Dose:  Not Given


Mirtazapine (Remeron)  7.5 mg PO [facility]


   Last Admin: 11/13/17 21:55 Dose:  7.5 mg


Ondansetron HCl (Zofran Inj)  4 mg IVP Q4 PRN


   PRN Reason: Nausea/Vomiting


Pantoprazole Sodium (Protonix Susp)  40 mg PO DAILY [facility]


   Last Admin: 11/14/17 10:17 Dose:  40 mg


Rosuvastatin Calcium (Crestor)  5 mg PO HS [facility]


   Last Admin: 11/13/17 21:56 Dose:  5 mg











- Labs


Labs: 


 





 11/13/17 07:02 





 11/13/17 07:02 





 











PT  11.9 SECONDS (9.7-12.2)   11/10/17  07:38    


 


INR  1.1   11/10/17  07:38    


 


APTT  30 SECONDS (21-34)   11/10/17  07:38    














- Eye Exam


Pupil Exam: NORMAL ACCOMODATION





- ENT Exam


ENT Exam: Normal Exam





- Respiratory Exam


Respiratory Exam: Clear to Ausculation Bilateral





- Cardiovascular Exam


Cardiovascular Exam: REGULAR RHYTHM





- GI/Abdominal Exam


GI & Abdominal Exam: Normal Bowel Sounds


Additional comments: 





PATIENT HAS A PEG DRESING DRY AND INTACT; NO SIGN OF REDNESS OR WHOOPING NOTED 

AROUND THE INCISION SITE





-  Exam


Additional comments: 





PATIENT HAS STUBBS WHICH WAS PLACED ON 11/2/17; PER DR VITALE DUE SACRAL DECUBITI





Assessment and Plan





- Assessment and Plan (Free Text)


Assessment: 


A/P 





PATIENT IS SEEN AND EXAMINED; HAS A PEG AND SHE IS BEING FEED AT 30 CC AND 

PATIENT SEEMS TO TOLERATE; Emphasized that family needs to step up to the plate 

and have responsibility for their family member AND START PO FEEDING BUT NO 

FAMILY AT THE BEDSIDE TODAY


SACRAL DECUBITI NOTED BUT HEALING 


DISCUSS DISCHARGE PLANNING WITH DR VITALE AND PATIENT NEED TO BE 


DISCHARGE TO [facility] AND ADMIT UNDER DR DR VITALE 


CONTINUE ALL HOME MEDS AS ORDER IN THE MED RECS PER DR VITALE


CONTINUE [facility] UNTIL DEC 3RD AS PER INFECTIOUS  DISEASE MD AND DR VITALE 


PATIENT WILL GO TO THE [facility] WITH PICC LINE FOR IV ABX, STUBBS WHICH WAS 

INSERTED ON 11/2/17 AND IT IS GOOD FOR 30 DAYS PER PROTOCOL


PATIENT HAS A PEG AND FEEDING RATE WILL BE ADDRESS BY DR VITALE 


FOLLOW UP BLOOD WORK, STUBBS CARE, AND PEG CARE AS PER FACILITY PROTOCOL

## 2019-04-26 ENCOUNTER — HOSPITAL ENCOUNTER (EMERGENCY)
Dept: HOSPITAL 31 - C.ER | Age: 79
LOS: 1 days | Discharge: HOME | End: 2019-04-27
Payer: MEDICARE

## 2019-04-26 VITALS — RESPIRATION RATE: 16 BRPM

## 2019-04-26 VITALS — BODY MASS INDEX: 25.7 KG/M2

## 2019-04-26 DIAGNOSIS — K94.23: Primary | ICD-10-CM

## 2019-04-26 NOTE — C.PDOC
History Of Present Illness





Patient sent from nursing home for clogged peg tube.





Time Seen by Provider: 04/26/19 22:43


Chief Complaint (Nursing): GI Problem


History Per: Other (Nursing home)


History/Exam Limitations: no limitations


Onset/Duration Of Symptoms: Hrs


Current Symptoms Are (Timing): Still Present


Severity: None


Pain Scale Rating Of: 0





Past Medical History


Reviewed: Historical Data, Nursing Documentation, Vital Signs


Vital Signs: 





                                Last Vital Signs











Temp  97.9 F   04/26/19 22:30


 


Pulse  78   04/26/19 22:30


 


Resp  16   04/26/19 22:30


 


BP  146/85   04/26/19 22:30


 


Pulse Ox  96   04/26/19 22:30














- Medical History


PMH: Arthritis, CVA, HTN, Hypercholesterolemia, Hyperlipidemia


   Denies: Chronic Kidney Disease


   Comment Only: Depression (DEPRESSIVE DISORDER)





- [facility] Procedures











EXTIRPATION OF MATTER FROM GI TRACT, PERC APPROACH (10/24/17)


INSERTION OF FEEDING DEVICE INTO STOMACH, PERC APPROACH (10/24/17)


INSERTION OF INFUSION DEV INTO SUP VENA CAVA, PERC APPROACH (10/24/17)


INSPECTION OF BLADDER, ENDO (10/24/17)


INSPECTION OF LOWER INTESTINAL TRACT, ENDO (10/24/17)


RELEASE LEFT OVARY, OPEN APPROACH (10/24/17)


RESECTION OF BILATERAL FALLOPIAN TUBES, OPEN APPROACH (10/24/17)


RESECTION OF BILATERAL OVARIES, OPEN APPROACH (10/24/17)


RESECTION OF CERVIX, OPEN APPROACH (10/24/17)


RESECTION OF UTERUS, OPEN APPROACH (10/24/17)


TRANSFUSE NONAUT RED BLOOD CELLS IN PERIPH VEIN, PERC (10/24/17)








Family History: States: No Known Family Hx





- Social History


Hx Alcohol Use: No


Hx Substance Use: No





- Immunization History


Hx Tetanus Toxoid Vaccination: No


Hx Influenza Vaccination: Yes


Hx Pneumococcal Vaccination: Yes





Review Of Systems


Constitutional: Negative for: Fever, Chills


Cardiovascular: Negative for: Chest Pain, Palpitations


Respiratory: Negative for: Cough, Shortness of Breath


Gastrointestinal: Negative for: Nausea, Vomiting


Neurological: Negative for: Weakness, Numbness





Physical Exam





- Physical Exam


Appears: Non-toxic


Skin: Warm, Dry


Head: Normacephalic


Oral Mucosa: Moist


Chest: Symmetrical


Cardiovascular: Rhythm Regular


Respiratory: No Rales, No Rhonchi, No Wheezing


Gastrointestinal/Abdominal: Soft, No Tenderness, Other (peg tube to LUQ)


Neurological/Psych: Oriented x3





ED Course And Treatment


O2 Sat by Pulse Oximetry: 96 (room air)


Pulse Ox Interpretation: Normal


Progress Note: Peg tube flushed, functional again.


Reevaluation Time: 23:41


Reassessment Condition: Improved





Disposition


Counseled Patient/Family Regarding: Studies Performed, Diagnosis, Need For 

Followup





- Disposition


Referrals: 


Yared Herring MD [Staff Provider] - 


Disposition: HOME/ ROUTINE


Disposition Time: 22:43


Condition: FAIR


Instructions:  How to Care for Your PEG Tube 


Forms:  [facility] (English)





- Clinical Impression


Clinical Impression: 


 PEG tube malfunction








- Scribe Statement


The provider has reviewed the documentation as recorded by the Scribe





David Simpson





All medical record entries made by the Scribe were at my direction and 

personally dictated by me. I have reviewed the chart and agree that the record 

accurately reflects my personal performance of the history, physical exam, 

medical decision making, and the department course for this patient. I have also

personally directed, reviewed, and agree with the discharge instructions and 

disposition.

## 2019-04-27 VITALS
HEART RATE: 87 BPM | DIASTOLIC BLOOD PRESSURE: 78 MMHG | SYSTOLIC BLOOD PRESSURE: 152 MMHG | TEMPERATURE: 98.7 F | OXYGEN SATURATION: 99 %

## 2024-06-03 NOTE — CT
PROCEDURE:  CT HEAD WITHOUT CONTRAST.



HISTORY:

right facial droop



COMPARISON:

11/02/2017 



TECHNIQUE:

Axial computed tomography images were obtained through the head/brain 

without intravenous contrast.  



Radiation dose:



Total exam DLP = 1435 mGy-cm.



This CT exam was performed using one or more of the following dose 

reduction techniques: Automated exposure control, adjustment of the 

mA and/or kV according to patient size, and/or use of iterative 

reconstruction technique.



FINDINGS:



HEMORRHAGE:

No intracranial hemorrhage. 



BRAIN:

Confluent areas of low attenuation seen within the high right frontal 

and subcortical white matter, right corona radiata, and lying left 

parietal cortical subcortical white matter suggestive for infarcts, 

age indeterminate. Persistent right caudate head and basal ganglia 

lacunar infarcts.  Punctate calcification within the left basal 

ganglia.  Scattered focal lucencies in the subcortical and 

periventricular white matter suggestive for chronic microvascular 

ischemic change. 



VENTRICLES:

Unremarkable. No hydrocephalus. 



CALVARIUM:

Unremarkable.



PARANASAL SINUSES:

Retained mucosal secretions the left maxillary sinus.



MASTOID AIR CELLS:

Unremarkable as visualized. No inflammatory changes.



OTHER FINDINGS:

None.



IMPRESSION:

No evidence of acute intracranial hemorrhage.



Chronic microvascular ischemic change.



Age-indeterminate infarcts as above within the right frontal, right 

corona radiata, and left parietal regions.



Lacunar infarcts in the right caudate head and basal ganglia.



Further evaluation with diffusion-weighted MRI may be helpful if 

clinically indicated to better evaluate for acute ischemic change. [FreeTextEntry1] : Sleep study (12.28.2023) revealed sleep apnea with an AHI/MAXWELL of 0.5 and a low oxygen saturation of 84%  PFTs revealed normal flows; FEV1 was 3.98 L, which is 101% of predicted; normal flow volume loop. PFTs were performed to evaluate for SOB  FENO was 25; a normal value being less than 25 Fractional exhaled nitric oxide (FENO) is regarded as a simple, noninvasive method for assessing eosinophilic airway inflammation. Produced by a variety of cells within the lung, nitric oxide (NO) concentrations are generally low in healthy individuals. However, high concentrations of NO appear to be involved in nonspecific host defense mechanisms and chronic inflammatory diseases such as asthma. The American Thoracic Society (ATS) therefore has recommended using FENO to aid in the diagnosis and monitoring of eosinophilic airway inflammation and asthma, and for identifying steroid responsive individuals whose chronic respiratory symptoms may be caused by airway inflammation.